# Patient Record
Sex: MALE | Race: WHITE | Employment: UNEMPLOYED | URBAN - METROPOLITAN AREA
[De-identification: names, ages, dates, MRNs, and addresses within clinical notes are randomized per-mention and may not be internally consistent; named-entity substitution may affect disease eponyms.]

---

## 2019-08-27 PROBLEM — E66.01 OBESITY, MORBID (HCC): Status: ACTIVE | Noted: 2019-08-27

## 2019-09-03 ENCOUNTER — HOSPITAL ENCOUNTER (OUTPATIENT)
Dept: LAB | Age: 59
Discharge: HOME | End: 2019-09-03
Payer: OTHER GOVERNMENT

## 2019-09-03 DIAGNOSIS — R06.02 SHORTNESS OF BREATH: ICD-10-CM

## 2019-09-03 LAB
ANION GAP SERPL CALC-SCNC: 3 MMOL/L (ref 7–16)
BASOPHILS # BLD: 0.1 K/UL (ref 0–0.2)
BASOPHILS NFR BLD: 1 % (ref 0–2)
BUN SERPL-MCNC: 16 MG/DL (ref 6–23)
CALCIUM SERPL-MCNC: 9.1 MG/DL (ref 8.3–10.4)
CHLORIDE SERPL-SCNC: 107 MMOL/L (ref 98–107)
CO2 SERPL-SCNC: 29 MMOL/L (ref 21–32)
CREAT SERPL-MCNC: 1.44 MG/DL (ref 0.8–1.5)
DIFFERENTIAL METHOD BLD: ABNORMAL
EOSINOPHIL # BLD: 0.4 K/UL (ref 0–0.8)
EOSINOPHIL NFR BLD: 6 % (ref 0.5–7.8)
ERYTHROCYTE [DISTWIDTH] IN BLOOD BY AUTOMATED COUNT: 13.1 % (ref 11.9–14.6)
GLUCOSE SERPL-MCNC: 87 MG/DL (ref 65–100)
HCT VFR BLD AUTO: 43.2 % (ref 41.1–50.3)
HGB BLD-MCNC: 14 G/DL (ref 13.6–17.2)
IMM GRANULOCYTES # BLD AUTO: 0 K/UL (ref 0–0.5)
IMM GRANULOCYTES NFR BLD AUTO: 0 % (ref 0–5)
INR PPP: 1
LYMPHOCYTES # BLD: 2.3 K/UL (ref 0.5–4.6)
LYMPHOCYTES NFR BLD: 33 % (ref 13–44)
MCH RBC QN AUTO: 30.8 PG (ref 26.1–32.9)
MCHC RBC AUTO-ENTMCNC: 32.4 G/DL (ref 31.4–35)
MCV RBC AUTO: 94.9 FL (ref 79.6–97.8)
MONOCYTES # BLD: 0.5 K/UL (ref 0.1–1.3)
MONOCYTES NFR BLD: 8 % (ref 4–12)
NEUTS SEG # BLD: 3.7 K/UL (ref 1.7–8.2)
NEUTS SEG NFR BLD: 53 % (ref 43–78)
NRBC # BLD: 0 K/UL (ref 0–0.2)
PLATELET # BLD AUTO: 169 K/UL (ref 150–450)
PMV BLD AUTO: 12.7 FL (ref 9.4–12.3)
POTASSIUM SERPL-SCNC: 4.8 MMOL/L (ref 3.5–5.1)
PROTHROMBIN TIME: 13.7 SEC (ref 11.7–14.5)
RBC # BLD AUTO: 4.55 M/UL (ref 4.23–5.6)
SODIUM SERPL-SCNC: 139 MMOL/L (ref 136–145)
WBC # BLD AUTO: 6.9 K/UL (ref 4.3–11.1)

## 2019-09-03 PROCEDURE — 36415 COLL VENOUS BLD VENIPUNCTURE: CPT

## 2019-09-03 PROCEDURE — 85610 PROTHROMBIN TIME: CPT

## 2019-09-03 PROCEDURE — 80048 BASIC METABOLIC PNL TOTAL CA: CPT

## 2019-09-03 PROCEDURE — 85025 COMPLETE CBC W/AUTO DIFF WBC: CPT

## 2019-09-04 NOTE — PROGRESS NOTES
Patient pre-assessment complete for [facility] poss with DR Nell Block scheduled for 19 at 9:30am, arrival time 7:30am. Patient verified using . Patient instructed to bring all home medications in labeled bottles on the day of procedure. NPO status reinforced. Patient informed to take a full dose aspirin 325mg  or 81 mg x 4 on the day of procedure. Patient instructed to HOLD lisinapril/HCTZ & benazepril/HCTZ in am . Instructed they can take all other medications excluding vitamins & supplements. Patient verbalizes understanding of all instructions & denies any questions at this time.

## 2019-09-05 ENCOUNTER — HOSPITAL ENCOUNTER (OUTPATIENT)
Dept: CARDIAC CATH/INVASIVE PROCEDURES | Age: 59
Discharge: HOME | End: 2019-09-05
Attending: INTERNAL MEDICINE | Admitting: INTERNAL MEDICINE
Payer: COMMERCIAL

## 2019-09-05 VITALS
HEART RATE: 55 BPM | WEIGHT: 285 LBS | HEIGHT: 70 IN | SYSTOLIC BLOOD PRESSURE: 106 MMHG | TEMPERATURE: 98 F | OXYGEN SATURATION: 91 % | DIASTOLIC BLOOD PRESSURE: 69 MMHG | BODY MASS INDEX: 40.8 KG/M2 | RESPIRATION RATE: 18 BRPM

## 2019-09-05 LAB
ANION GAP SERPL CALC-SCNC: 6 MMOL/L (ref 7–16)
BUN SERPL-MCNC: 21 MG/DL (ref 6–23)
CALCIUM SERPL-MCNC: 9.1 MG/DL (ref 8.3–10.4)
CHLORIDE SERPL-SCNC: 107 MMOL/L (ref 98–107)
CO2 SERPL-SCNC: 27 MMOL/L (ref 21–32)
CREAT SERPL-MCNC: 1.5 MG/DL (ref 0.8–1.5)
GLUCOSE SERPL-MCNC: 89 MG/DL (ref 65–100)
POTASSIUM SERPL-SCNC: 4.3 MMOL/L (ref 3.5–5.1)
SODIUM SERPL-SCNC: 140 MMOL/L (ref 136–145)

## 2019-09-05 PROCEDURE — 74011000250 HC RX REV CODE- 250: Performed by: INTERNAL MEDICINE

## 2019-09-05 PROCEDURE — 77030004534 HC CATH ANGI DX INFN CARD -A

## 2019-09-05 PROCEDURE — 93458 L HRT ARTERY/VENTRICLE ANGIO: CPT

## 2019-09-05 PROCEDURE — 77030029997 HC DEV COM RDL R BND TELE -B

## 2019-09-05 PROCEDURE — 77030015766

## 2019-09-05 PROCEDURE — C1894 INTRO/SHEATH, NON-LASER: HCPCS

## 2019-09-05 PROCEDURE — 74011636320 HC RX REV CODE- 636/320: Performed by: INTERNAL MEDICINE

## 2019-09-05 PROCEDURE — 80048 BASIC METABOLIC PNL TOTAL CA: CPT

## 2019-09-05 PROCEDURE — 74011250636 HC RX REV CODE- 250/636

## 2019-09-05 PROCEDURE — 99152 MOD SED SAME PHYS/QHP 5/>YRS: CPT

## 2019-09-05 PROCEDURE — C1769 GUIDE WIRE: HCPCS

## 2019-09-05 PROCEDURE — 74011250636 HC RX REV CODE- 250/636: Performed by: INTERNAL MEDICINE

## 2019-09-05 PROCEDURE — 99153 MOD SED SAME PHYS/QHP EA: CPT

## 2019-09-05 PROCEDURE — C1887 CATHETER, GUIDING: HCPCS

## 2019-09-05 RX ORDER — SODIUM CHLORIDE 0.9 % (FLUSH) 0.9 %
5-40 SYRINGE (ML) INJECTION EVERY 8 HOURS
Status: DISCONTINUED | OUTPATIENT
Start: 2019-09-05 | End: 2019-09-05 | Stop reason: HOSPADM

## 2019-09-05 RX ORDER — GUAIFENESIN 100 MG/5ML
81-324 LIQUID (ML) ORAL
Status: DISCONTINUED | OUTPATIENT
Start: 2019-09-05 | End: 2019-09-05 | Stop reason: HOSPADM

## 2019-09-05 RX ORDER — SODIUM CHLORIDE 0.9 % (FLUSH) 0.9 %
5-40 SYRINGE (ML) INJECTION AS NEEDED
Status: DISCONTINUED | OUTPATIENT
Start: 2019-09-05 | End: 2019-09-05 | Stop reason: HOSPADM

## 2019-09-05 RX ORDER — HEPARIN SODIUM 200 [USP'U]/100ML
3 INJECTION, SOLUTION INTRAVENOUS CONTINUOUS
Status: DISCONTINUED | OUTPATIENT
Start: 2019-09-05 | End: 2019-09-05 | Stop reason: HOSPADM

## 2019-09-05 RX ORDER — LIDOCAINE HYDROCHLORIDE 10 MG/ML
10-200 INJECTION INFILTRATION; PERINEURAL ONCE
Status: COMPLETED | OUTPATIENT
Start: 2019-09-05 | End: 2019-09-05

## 2019-09-05 RX ORDER — FENTANYL CITRATE 50 UG/ML
25-50 INJECTION, SOLUTION INTRAMUSCULAR; INTRAVENOUS
Status: DISCONTINUED | OUTPATIENT
Start: 2019-09-05 | End: 2019-09-05 | Stop reason: HOSPADM

## 2019-09-05 RX ORDER — SODIUM CHLORIDE 9 MG/ML
75 INJECTION, SOLUTION INTRAVENOUS CONTINUOUS
Status: DISCONTINUED | OUTPATIENT
Start: 2019-09-05 | End: 2019-09-05 | Stop reason: HOSPADM

## 2019-09-05 RX ORDER — MIDAZOLAM HYDROCHLORIDE 1 MG/ML
.5-2 INJECTION, SOLUTION INTRAMUSCULAR; INTRAVENOUS
Status: DISCONTINUED | OUTPATIENT
Start: 2019-09-05 | End: 2019-09-05 | Stop reason: HOSPADM

## 2019-09-05 RX ORDER — DIAZEPAM 5 MG/1
5 TABLET ORAL ONCE
Status: DISCONTINUED | OUTPATIENT
Start: 2019-09-05 | End: 2019-09-05 | Stop reason: HOSPADM

## 2019-09-05 RX ADMIN — FENTANYL CITRATE 25 MCG: 50 INJECTION, SOLUTION INTRAMUSCULAR; INTRAVENOUS at 09:36

## 2019-09-05 RX ADMIN — HEPARIN SODIUM 2 ML: 10000 INJECTION, SOLUTION INTRAVENOUS; SUBCUTANEOUS at 09:24

## 2019-09-05 RX ADMIN — SODIUM CHLORIDE 75 ML/HR: 900 INJECTION, SOLUTION INTRAVENOUS at 08:46

## 2019-09-05 RX ADMIN — FENTANYL CITRATE 50 MCG: 50 INJECTION, SOLUTION INTRAMUSCULAR; INTRAVENOUS at 09:50

## 2019-09-05 RX ADMIN — HEPARIN SODIUM 3 ML/HR: 5000 INJECTION, SOLUTION INTRAVENOUS; SUBCUTANEOUS at 09:11

## 2019-09-05 RX ADMIN — IOPAMIDOL 100 ML: 755 INJECTION, SOLUTION INTRAVENOUS at 09:54

## 2019-09-05 RX ADMIN — LIDOCAINE HYDROCHLORIDE 2 ML: 10 INJECTION, SOLUTION INFILTRATION; PERINEURAL at 09:24

## 2019-09-05 RX ADMIN — FENTANYL CITRATE 25 MCG: 50 INJECTION, SOLUTION INTRAMUSCULAR; INTRAVENOUS at 09:24

## 2019-09-05 RX ADMIN — MIDAZOLAM HYDROCHLORIDE 2 MG: 1 INJECTION, SOLUTION INTRAMUSCULAR; INTRAVENOUS at 09:24

## 2019-09-05 NOTE — DISCHARGE INSTRUCTIONS

## 2019-09-05 NOTE — PROCEDURES
Brief Cardiac Procedure Note    Patient: Jerald Dent MRN: [id number]  SSN: [id number]    YOB: 1960  Age: 61 y.o. Sex: male      Date of Procedure: 9/5/2019     Pre-procedure Diagnosis: Typical Angina    Post-procedure Diagnosis: Non-cardiac Chest Pain    Reason for Procedure: New Onset Angina < or = 2 Months    Procedure: Left Heart Catheterization    Brief Description of Procedure: lhc via right radial, tr    Performed By: Agata Breaux MD     Assistants: none    Anesthesia: Moderate Sedation    Estimated Blood Loss: Less than 10 mL      Specimens: None    Implants: None    Findings: normal cors and ef    Complications: None    Recommendations: Continue medical therapy.     Signed By: Agata Breaux MD     September 5, 2019

## 2019-09-05 NOTE — PROGRESS NOTES
Discharge instructions and home medications reviewed with patient. Time allowed for questions and answers.  Discharged to home via wheelchair

## 2019-09-05 NOTE — PROGRESS NOTES
TRANSFER - OUT REPORT:    Verbal report given to Rekha Remy on Soniya Blizzard.  being transferred to [facility] for routine progression of care       Report consisted of patients Situation, Background, Assessment and Recommendations(SBAR). Information from the following report(s) SBAR, Kardex, Procedure Summary and MAR was reviewed with the receiving nurse. Opportunity for questions and clarification was provided.       [facility] with Dr Jad Woo  No intervention  2 versed  100 fentanyl  Right radial Rband 10ml at 1000

## 2019-09-05 NOTE — PROGRESS NOTES
Report received from Liat Mercado Cath Lab RN. Procedural findings communicated. Intra procedural  medication administration reviewed. Progression of care discussed.      Patient received into [facility] [place] [facility] post sheath removal.     Right Radial access site without bleeding or swelling     TR band dry and intact     Patient instructed to limit movement to right upper extremity    Routine post procedural vital signs and site assessment initiated

## 2019-09-05 NOTE — PROGRESS NOTES
Patient received to [address] [place] [address] room # 9  Ambulatory from [facility]. Patient scheduled for [facility] today with Dr Umesh Gregg. Procedure reviewed & questions answered, voiced good understanding consent obtained & placed on chart. All medications and medical history reviewed. Will prep patient per orders. Patient & family updated on plan of care. The patient has a fraility score of 3-MANAGING WELL, based on independent of ADLs/ambulation. Increased symptoms with exertion.

## 2019-09-06 NOTE — PROCEDURES
[facility]  CARDIAC CATH    Name:  Venita Ro  MR#:  [id number]  :  1960  ACCOUNT #:  [de-identified]  DATE OF SERVICE:  2019    PREOPERATIVE DIAGNOSIS:  Chest pain. POSTOPERATIVE DIAGNOSIS:  Noncardiac chest pain. PROCEDURE PERFORMED:  Left heart catheterization via the right radial artery with a 5-Persian Estuardo right and a 5-Persian EBU3.0 guide and an angled pigtail. Radial cocktail was given by protocol. Of note, the patient has extreme angulation of his subclavian transition into his aorta with a high left main making cannulation of vessels extremely difficult. SURGEON:  Glenard Libman, MD    ASSISTANT:  None. ESTIMATED BLOOD LOSS:  5 mL. SPECIMENS REMOVED:  None. COMPLICATIONS:  None. IMPLANTS:  None. ANESTHESIA:  Conscious sedation was given under my direct supervision by Debora Blanco RN, beginning at 9:20, concluding at 9:57, a total of 2 mg Versed, 100 mcg fentanyl. Vital signs and saturations were stable throughout. FINDINGS:  The left ventricle is normal size with low normal systolic function. Ejection fraction is 50%. Left ventricular end-diastolic pressure is 17 mmHg with an opening aortic pressure of 95/66. No gradient across the aortic valve. Right coronary artery is a dominant vessel in the normal anatomic position. There is some ectasia in the proximal segment of the vessel. There is mild 20% distal irregularity. The PDA and posterolateral obtuse marginal branches are free of significant disease. The left main coronary artery is in normal anatomic position, free of significant disease. Bifurcates into LAD and circumflex systems. There is no disease in the left main. The LAD has a proximal diagonal branch. 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 The distal vessel is free of significant disease. The circumflex terminates into a moderate-sized obtuse marginal branch.   There is no significant atherosclerosis seen in the circumflex vessel with small distal branches. CONCLUSION:  1. No significant coronary artery atherosclerotic heart disease with preserved LV systolic function. 2.  TR band for hemostasis.         Clare Espinoza MD      [id number]  D:  09/05/2019 10:11  T:  09/05/2019 10:20  JOB #:  [id number]

## 2021-07-15 PROBLEM — M25.562 CHRONIC PAIN OF LEFT KNEE: Status: ACTIVE | Noted: 2021-07-15

## 2021-07-15 PROBLEM — M1A.0720 CHRONIC IDIOPATHIC GOUT INVOLVING TOE OF LEFT FOOT WITHOUT TOPHUS: Status: ACTIVE | Noted: 2021-07-15

## 2021-07-15 PROBLEM — N28.9 RENAL INSUFFICIENCY: Status: ACTIVE | Noted: 2021-07-15

## 2021-07-15 PROBLEM — G89.29 CHRONIC PAIN OF LEFT KNEE: Status: ACTIVE | Noted: 2021-07-15

## 2021-09-13 NOTE — PERIOP NOTES
Called patient to confirm PAT appointment. Patient given directions to [facility] at [address], [address]. Patient states understanding of date, time, and location. Instructed to bring medications with them.

## 2021-09-14 ENCOUNTER — HOSPITAL ENCOUNTER (OUTPATIENT)
Dept: LAB | Age: 61
Discharge: HOME | End: 2021-09-14

## 2021-09-14 PROCEDURE — 88305 TISSUE EXAM BY PATHOLOGIST: CPT

## 2021-09-15 ENCOUNTER — HOSPITAL ENCOUNTER (OUTPATIENT)
Dept: SURGERY | Age: 61
Discharge: HOME | End: 2021-09-15
Attending: ORTHOPAEDIC SURGERY
Payer: MEDICARE

## 2021-09-15 VITALS
SYSTOLIC BLOOD PRESSURE: 111 MMHG | RESPIRATION RATE: 20 BRPM | HEIGHT: 69 IN | HEART RATE: 103 BPM | OXYGEN SATURATION: 96 % | DIASTOLIC BLOOD PRESSURE: 73 MMHG | BODY MASS INDEX: 43.04 KG/M2 | TEMPERATURE: 98.4 F | WEIGHT: 290.6 LBS

## 2021-09-15 LAB
ANION GAP SERPL CALC-SCNC: 6 MMOL/L (ref 7–16)
BACTERIA SPEC CULT: NORMAL
BUN SERPL-MCNC: 33 MG/DL (ref 8–23)
CALCIUM SERPL-MCNC: 9.4 MG/DL (ref 8.3–10.4)
CHLORIDE SERPL-SCNC: 108 MMOL/L (ref 98–107)
CO2 SERPL-SCNC: 26 MMOL/L (ref 21–32)
CREAT SERPL-MCNC: 2.03 MG/DL (ref 0.8–1.5)
ERYTHROCYTE [DISTWIDTH] IN BLOOD BY AUTOMATED COUNT: 13.4 % (ref 11.9–14.6)
GLUCOSE SERPL-MCNC: 131 MG/DL (ref 65–100)
HCT VFR BLD AUTO: 44 % (ref 41.1–50.3)
HGB BLD-MCNC: 14.6 G/DL (ref 13.6–17.2)
MCH RBC QN AUTO: 31.5 PG (ref 26.1–32.9)
MCHC RBC AUTO-ENTMCNC: 33.2 G/DL (ref 31.4–35)
MCV RBC AUTO: 94.8 FL (ref 79.6–97.8)
NRBC # BLD: 0 K/UL (ref 0–0.2)
PLATELET # BLD AUTO: 200 K/UL (ref 150–450)
PMV BLD AUTO: 12.2 FL (ref 9.4–12.3)
POTASSIUM SERPL-SCNC: 4.1 MMOL/L (ref 3.5–5.1)
RBC # BLD AUTO: 4.64 M/UL (ref 4.23–5.6)
SERVICE CMNT-IMP: NORMAL
SODIUM SERPL-SCNC: 140 MMOL/L (ref 136–145)
WBC # BLD AUTO: 10.1 K/UL (ref 4.3–11.1)

## 2021-09-15 PROCEDURE — 85027 COMPLETE CBC AUTOMATED: CPT

## 2021-09-15 PROCEDURE — 87641 MR-STAPH DNA AMP PROBE: CPT

## 2021-09-15 PROCEDURE — 80048 BASIC METABOLIC PNL TOTAL CA: CPT

## 2021-09-15 RX ORDER — ASPIRIN 81 MG/1
81 TABLET ORAL DAILY
COMMUNITY

## 2021-09-15 NOTE — PERIOP NOTES
PLEASE CONTINUE TAKING ALL PRESCRIPTION MEDICATIONS UP TO THE DAY OF SURGERY UNLESS OTHERWISE DIRECTED BELOW. DISCONTINUE all vitamins and supplements 7 days prior to surgery. DISCONTINUE Non-Steriodal Anti-Inflammatory (NSAIDS) such as Advil and Aleve 5 days prior to surgery. Home Medications to take  the day of surgery   Allopurinol/zyloprim  Atorvastatin/lipitor  Fluoxetine/prozac  Pantoprazole/protonix     9/19/2021: On the day before surgery please take Acetaminophen 1000mg in the morning and then again before bed. You may substitute for Tylenol 650 mg. Home Medications   to Hold   DISCONTINUE all vitamins and supplements 7 days prior to surgery. DISCONTINUE Non-Steriodal Anti-Inflammatory (NSAIDS) such as Advil and Aleve 5 days prior to surgery. Comments    Covid test 9/16/2021 @ 10 a.m. @ [address]              Please do not bring home medications with you on the day of surgery unless otherwise directed by your nurse. If you are instructed to bring home medications, please give them to your nurse as they will be administered by the nursing staff. If you have any questions, please call [facility] [phone] or [address] [place] [address] [phone]. A copy of this note was provided to the patient for reference.

## 2021-09-15 NOTE — PERIOP NOTES
Called to Dr. Clemente Kowalski due to increased BUN and creatinine; Dr. Clemente Kowalski asked for patient's primary care to be called to see if the patient needs to be seen due to increase in BUN and Creatinine. Patient could be in HIEU or have CKD. Attempted to call office but they had already closed for the day and had the answering service on. Called to Dr. Osmin Mclean office to inform them of the elevated BUN and creatinine. Talked with Howard Ramirez at Dr. Osmin Mclean and she will inform Dr. Jean Lind and get the patient seen by his PCP or his kidney doctor before surgery. Per tunde patient had been moved to Tuesday anyway due to no availability at [facility] and will have surgery Tuesday 9/21/2021. Results for [place] Tariq (MRN [id number]) as of 9/16/2021 07:38   Ref. Range 7/15/2021 13:03 9/15/2021 14:07   Sodium Latest Ref Range: 136 - 145 mmol/L 138 140   Potassium Latest Ref Range: 3.5 - 5.1 mmol/L 5.0 4.1   Chloride Latest Ref Range: 98 - 107 mmol/L 99 108 (H)   CO2 Latest Ref Range: 21 - 32 mmol/L 24 26   Anion gap Latest Ref Range: 7 - 16 mmol/L  6 (L)   Glucose Latest Ref Range: 65 - 100 mg/dL 89 131 (H)   BUN Latest Ref Range: 8 - 23 MG/DL 24 33 (H)   Creatinine Latest Ref Range: 0.8 - 1.5 MG/DL 1.26 2.03 (H)   BUN/Creatinine ratio Latest Ref Range: 10 - 24  19    Calcium Latest Ref Range: 8.3 - 10.4 MG/DL 9.8 9.4   GFR est non-AA Latest Ref Range: >60 ml/min/1.73m2 61 36 (L)   GFR est AA Latest Ref Range: >60 ml/min/1.73m2 71 43 (L)   Bilirubin, total Latest Ref Range: 0.0 - 1.2 mg/dL 0.3    Protein, total Latest Ref Range: 6.0 - 8.5 g/dL 7.4    Albumin Latest Ref Range: 3.8 - 4.8 g/dL 4.7      Routing this note and copy of attached electrolyte panels to Dr. Jean Lind for review. BUN and Creatinine have increased from labs taken on 7/15/2021.

## 2021-09-15 NOTE — PERIOP NOTES
Patient verified name and     Order for consent NOT found in EHR; patient verified procedure from case posting. Type 3 surgery, in person PAT assessment complete. Labs per surgeon: Unknown  Labs per anesthesia protocol: CBC no diff, BMP, MRSA/MSSA T&S DOS  [id number] Sinus rhythm, left axis deviation, abnormal R Wave progression; no c/p, no SOB. Patient has had cardiac workup in 2021 that was all normal. LVEF 65% for stress test and no intervention when had heart cath in . Has been discharged from cardiology until further needs are identified by the patient. Patient COVID test date 2021; Patient given time and date and address for the appointment. The testing center is located at the [address], [place]. If appointment is needed patient provided telephone number of [phone]. Patient has NOT had covid vaccination but had covid in 2020. Patient was NOT hospitalized for covid. Hospital approved surgical skin cleanser and instructions given per hospital policy. Patient provided with and instructed on educational handouts including Guide to Surgery, Pain Management, Hand Hygiene, Blood Transfusion Education, and [place] [facility] Brochure. Patient answered medical/surgical history questions at their best of ability. All prior to admission medications documented in [facility]. Original medication prescription bottles visualized during patient appointment. Patient instructed to hold all vitamins 7 days prior to surgery and NSAIDS 5 days prior to surgery, patient verbalized understanding. Patient teach back successful and patient demonstrates knowledge of instructions.

## 2021-09-17 ENCOUNTER — HOSPITAL ENCOUNTER (OUTPATIENT)
Dept: SURGERY | Age: 61
Discharge: HOME | End: 2021-09-17

## 2021-09-19 NOTE — PERIOP NOTES
Patient's assessment completed 9/15/21. Pt surgery location changed to [facility] and scheduled for 9/21/21. Patient aware of the above changes.

## 2021-10-22 ENCOUNTER — HOSPITAL ENCOUNTER (OUTPATIENT)
Dept: SURGERY | Age: 61
Discharge: HOME | End: 2021-10-22
Attending: ORTHOPAEDIC SURGERY
Payer: MEDICARE

## 2021-10-22 VITALS
DIASTOLIC BLOOD PRESSURE: 87 MMHG | BODY MASS INDEX: 44.66 KG/M2 | WEIGHT: 301.5 LBS | HEART RATE: 83 BPM | HEIGHT: 69 IN | TEMPERATURE: 98.1 F | SYSTOLIC BLOOD PRESSURE: 129 MMHG | RESPIRATION RATE: 18 BRPM | OXYGEN SATURATION: 96 %

## 2021-10-22 LAB
ANION GAP SERPL CALC-SCNC: 2 MMOL/L (ref 7–16)
BACTERIA SPEC CULT: NORMAL
BUN SERPL-MCNC: 17 MG/DL (ref 8–23)
CALCIUM SERPL-MCNC: 9.5 MG/DL (ref 8.3–10.4)
CHLORIDE SERPL-SCNC: 105 MMOL/L (ref 98–107)
CO2 SERPL-SCNC: 30 MMOL/L (ref 21–32)
CREAT SERPL-MCNC: 1.39 MG/DL (ref 0.8–1.5)
ERYTHROCYTE [DISTWIDTH] IN BLOOD BY AUTOMATED COUNT: 13.7 % (ref 11.9–14.6)
GLUCOSE SERPL-MCNC: 108 MG/DL (ref 65–100)
HCT VFR BLD AUTO: 41.9 % (ref 41.1–50.3)
HGB BLD-MCNC: 14 G/DL (ref 13.6–17.2)
MCH RBC QN AUTO: 32 PG (ref 26.1–32.9)
MCHC RBC AUTO-ENTMCNC: 33.4 G/DL (ref 31.4–35)
MCV RBC AUTO: 95.9 FL (ref 79.6–97.8)
NRBC # BLD: 0 K/UL (ref 0–0.2)
PLATELET # BLD AUTO: 187 K/UL (ref 150–450)
PMV BLD AUTO: 12.6 FL (ref 9.4–12.3)
POTASSIUM SERPL-SCNC: 4.1 MMOL/L (ref 3.5–5.1)
RBC # BLD AUTO: 4.37 M/UL (ref 4.23–5.6)
SERVICE CMNT-IMP: NORMAL
SODIUM SERPL-SCNC: 137 MMOL/L (ref 136–145)
WBC # BLD AUTO: 8.1 K/UL (ref 4.3–11.1)

## 2021-10-22 PROCEDURE — 85027 COMPLETE CBC AUTOMATED: CPT

## 2021-10-22 PROCEDURE — 80048 BASIC METABOLIC PNL TOTAL CA: CPT

## 2021-10-22 PROCEDURE — 87641 MR-STAPH DNA AMP PROBE: CPT

## 2021-10-22 PROCEDURE — 36415 COLL VENOUS BLD VENIPUNCTURE: CPT

## 2021-10-22 NOTE — H&P
Lawrence Jaramillo  History and Physical    Subjective  Problem list:   1 Left Knee pain   2 Right knee pain, TKA 3/8/21     This patient presents today for evaluation of left knee pain. The patient comes in today for evaluation, history and physical, and surgical consent signing. The surgical procedure was reviewed in detail with the patient. The risks, including but not limited to anesthesia, infection, deep vein thrombosis, pulmonary embolus, injury to vessels, tendons, and nerves, paralysis, stroke, heart attack, loss of limb, and death were discussed. The patient understands the post operative course and all questions were answered. No guarantees are made and all alternatives are given. The patient wishes to proceed with the surgery. Appropriate literature and relevant material was reviewed with the patient. Surgical procedure: left total knee replacement    Allergies: NKDA. Family health history: heart disease-father, diabetes-father     Major events: right knee arthroscopy , left thumb CMC arthroplasty, Lt CTR, foot surgery, gallbladder removed, gallstones busted     Ongoing medical problems: acid reflux, high cholesterol, gout     Social history: Patient denies tobacco and ETOH use. Patient is  and currently unemployed. Objective  Vital Signs: Height 69 inches; Weight 295 lbs; /83 mmHg; Temp 97.8 F; Pulse 91 bpm; Oxygen Saturation 96 %       Patient is a 64year old male who appears his given age and is in no apparent distress. Oriented to person, place, and time. Mood and affect are appropriate for age and situation. Assessment of respiratory effort reveals even and nonlabored respirations. GEN:NAD    Lungs clear to auscultation bilaterally. Heart rate regular without murmur heard to auscultation. The patient exhibits antalgic reciprocal gait, and is able to get onto and off of the examination table.        Bilateral Knee X-Rays (4 views, standing - CPT [id number]) taken 12-18-20 revealed: Bilateral knees with severe medial bone on bone loss of joint space. No other acute process is visualized. Left Knee Examination:      The inspection reveals no external signs of injury or trauma. No warmth or erythema noted. No palpable cords. There is a varus deformity. Palpation of the knee reveals tenderness to medial joint line. Knee flexion (active): 110 degrees, with pain. Knee extension (active):  0 degrees, with pain     The muscle tone is normal.     Vascular: Peripheral pulses normal 2/2 lower extremities. Neurologic: Sensation is intact and symmetrical in all dermatomes. Assessment    DIAGNOSIS:        Pain in left knee [ICD-10: M25.562], [ICD-9: 719.46], [SNOMED: 754608080777341]        Unilateral primary osteoarthritis, left knee [ICD-10: M17.12], [ICD-9: 715.16], [SNOMED: 801201617]        Pre-op evaluation [ICD-10: D64.573], [SNOMED: 616092240]  Plan  We discussed the pathophysiology of the diagnosis and options for treatment. We reviewed the conservative treatment options in detail. We discussed the surgical option(s) (left total knee replacement). We have talked about the complications of surgery, including the possibility of damage to nerves, arteries, vessels and tendons, bleeding, infection, the possibility of sustaining medical problems, even death. We have talked about the possibility that the condition may not improve after surgery  or that it could actually be worse. The patient seems to understand and accept these possible complications. The patient understands and wishes to proceed with surgery at this time. Informed surgical consent obtained. Surgery will be performed at [facility] on 10-29-21. The patient states that he uses [facility] in [facility] on [facility] for his pharmacy. All questions answered at this time. The patient knows to contact the office with any questions or concerns.      VERIFICATION OF ANCILLARY DOCUMENTATION: The portions of the chart completed by ancillary personnel were reviewed by the physician. Vipul Zuniga RTC : post-op. MEDICATIONS:        Prazosin HCl 2 MG Oral Capsule 2 tablets at bedtime. Atorvastatin Calcium 40 MG Oral Tablet 1 tablet (40 mg) orally daily        Allopurinol 300 mg oral tablet one daily.         Pantoprazole Sodium 40 MG Oral Tablet Delayed Release 1 tablet (40 mg) orally 2 times per day        Lisinopril-hydroCHLOROthiazide 20-12.5 MG Oral Tablet 2 tablets orally daily        Ambien 10 MG Oral Tablet 1 tablet (10 mg) orally daily at bedtime as needed        Diclofenac Potassium 50 MG Oral Tablet 1 tablet (50 mg) orally 2 times per day with food or milk as needed        Aspirin 81 MG Oral Tablet Delayed Release 1 tablet (81 mg) orally daily        FLUoxetine HCl 20 MG Oral Capsule one po TID

## 2021-10-22 NOTE — PERIOP NOTES
Patient verified name and     Order for consent not found in EHR . Type 3 surgery, PAT walk in assessment complete. Labs per surgeon: None found in EHR, MRSA swab per protocol; results Pending  Labs per anesthesia protocol: CBC, BMP; results pending  EKG: Done 2021 and was faxed from [facility], scanned into EHR for anesthesia reference. Pt had NM stress test 2021 and found in Care Everywhere for anesthesia reference and states:  Narrative    · Resting ECG: normal.   · Protocol: Pharmacologic using regadenoson   · Hemodynamic Response: adequate. · Symptoms: dyspnea. · Stress ECG: normal.   · LV Function: EF is 65%. LV function is normal. The wall motion is   normal.   · Left ventricle cavity size is normal.   · Findings consistent with tissue attenuation artifact. No ischemia or   infarct. Patient COVID test date 10/26/2021 @ 11:30; Patient aware    Hospital approved surgical skin cleanser and instructions given per hospital policy. Patient provided with and instructed on educational handouts including Guide to Surgery, Pain Management, Hand Hygiene, Blood Transfusion Education, and [place] [facility] Brochure. Patient answered medical/surgical history questions at their best of ability. All prior to admission medications documented in [facility]. Original medication prescription bottle not visualized during patient appointment. Patient instructed to hold all vitamins 7 days prior to surgery and NSAIDS 5 days prior to surgery, patient verbalized understanding. Patient teach back successful and patient demonstrates knowledge of instructions.

## 2021-10-22 NOTE — PERIOP NOTES
PLEASE CONTINUE TAKING ALL PRESCRIPTION MEDICATIONS UP TO THE DAY OF SURGERY UNLESS OTHERWISE DIRECTED BELOW. DISCONTINUE all vitamins and supplements 21 days prior to surgery. DISCONTINUE Non-Steriodal Anti-Inflammatory (NSAIDS) such as Advil and Aleve 5 days prior to surgery. Home Medications to take  the day of surgery   Allopurinol  Atorvastatin   Fluoxetine  Pantoprazole        Home Medications   to Hold   Diclofenac x 5 days prior to surgery   Last dose Saturday 10/23/2021        Comments    Covid test 10/26/2021 @ 11:30       [address]    On the day before surgery please take Acetaminophen 1000mg in the morning and then again before bed. You may substitute for Tylenol 650 mg. Please bring bottle of soap (Dynahex) and incentive spirometer to the hospital on the day of surgery. Please do not bring home medications with you on the day of surgery unless otherwise directed by your nurse. If you are instructed to bring home medications, please give them to your nurse as they will be administered by the nursing staff. If you have any questions, please call [facility] [phone]  A copy of this note was provided to the patient for reference.

## 2021-10-28 ENCOUNTER — ANESTHESIA EVENT (OUTPATIENT)
Dept: SURGERY | Age: 61
End: 2021-10-28
Payer: MEDICARE

## 2021-10-29 ENCOUNTER — ANESTHESIA (OUTPATIENT)
Dept: SURGERY | Age: 61
End: 2021-10-29
Payer: MEDICARE

## 2021-10-29 ENCOUNTER — HOSPITAL ENCOUNTER (OUTPATIENT)
Age: 61
Setting detail: OBSERVATION
Discharge: HOME HEALTH | End: 2021-10-31
Attending: ORTHOPAEDIC SURGERY | Admitting: ORTHOPAEDIC SURGERY
Payer: MEDICARE

## 2021-10-29 PROBLEM — M17.12 LOCALIZED OSTEOARTHRITIS OF LEFT KNEE: Status: ACTIVE | Noted: 2021-10-29

## 2021-10-29 PROCEDURE — 99218 HC RM OBSERVATION: CPT

## 2021-10-29 PROCEDURE — 77030040922 HC BLNKT HYPOTHRM STRY -A: Performed by: ANESTHESIOLOGY

## 2021-10-29 PROCEDURE — 2709999900 HC NON-CHARGEABLE SUPPLY

## 2021-10-29 PROCEDURE — C1713 ANCHOR/SCREW BN/BN,TIS/BN: HCPCS | Performed by: ORTHOPAEDIC SURGERY

## 2021-10-29 PROCEDURE — 74011250636 HC RX REV CODE- 250/636: Performed by: NURSE ANESTHETIST, CERTIFIED REGISTERED

## 2021-10-29 PROCEDURE — 74011250636 HC RX REV CODE- 250/636: Performed by: ANESTHESIOLOGY

## 2021-10-29 PROCEDURE — 74011000250 HC RX REV CODE- 250: Performed by: ORTHOPAEDIC SURGERY

## 2021-10-29 PROCEDURE — C1776 JOINT DEVICE (IMPLANTABLE): HCPCS | Performed by: ORTHOPAEDIC SURGERY

## 2021-10-29 PROCEDURE — 76210000017 HC OR PH I REC 1.5 TO 2 HR: Performed by: ORTHOPAEDIC SURGERY

## 2021-10-29 PROCEDURE — 2709999900 HC NON-CHARGEABLE SUPPLY: Performed by: ORTHOPAEDIC SURGERY

## 2021-10-29 PROCEDURE — 76060000035 HC ANESTHESIA 2 TO 2.5 HR: Performed by: ORTHOPAEDIC SURGERY

## 2021-10-29 PROCEDURE — 76942 ECHO GUIDE FOR BIOPSY: CPT | Performed by: ORTHOPAEDIC SURGERY

## 2021-10-29 PROCEDURE — 77030008075: Performed by: ORTHOPAEDIC SURGERY

## 2021-10-29 PROCEDURE — 77030003665 HC NDL SPN BBMI -A: Performed by: ANESTHESIOLOGY

## 2021-10-29 PROCEDURE — 74011250636 HC RX REV CODE- 250/636: Performed by: ORTHOPAEDIC SURGERY

## 2021-10-29 PROCEDURE — 77030040361 HC SLV COMPR DVT MDII -B

## 2021-10-29 PROCEDURE — 77030007880 HC KT SPN EPDRL BBMI -B: Performed by: ANESTHESIOLOGY

## 2021-10-29 PROCEDURE — 74011000250 HC RX REV CODE- 250: Performed by: NURSE ANESTHETIST, CERTIFIED REGISTERED

## 2021-10-29 PROCEDURE — G0378 HOSPITAL OBSERVATION PER HR: HCPCS

## 2021-10-29 PROCEDURE — 76010010054 HC POST OP PAIN BLOCK: Performed by: ORTHOPAEDIC SURGERY

## 2021-10-29 PROCEDURE — 77030031139 HC SUT VCRL2 J&J -A: Performed by: ORTHOPAEDIC SURGERY

## 2021-10-29 PROCEDURE — 77030008462 HC STPLR SKN PROX J&J -A: Performed by: ORTHOPAEDIC SURGERY

## 2021-10-29 PROCEDURE — 77030006835 HC BLD SAW SAG STRY -B: Performed by: ORTHOPAEDIC SURGERY

## 2021-10-29 PROCEDURE — 74011250637 HC RX REV CODE- 250/637: Performed by: ORTHOPAEDIC SURGERY

## 2021-10-29 PROCEDURE — 74011000250 HC RX REV CODE- 250: Performed by: ANESTHESIOLOGY

## 2021-10-29 PROCEDURE — 77030012551: Performed by: ORTHOPAEDIC SURGERY

## 2021-10-29 PROCEDURE — 76010000171 HC OR TIME 2 TO 2.5 HR INTENSV-TIER 1: Performed by: ORTHOPAEDIC SURGERY

## 2021-10-29 PROCEDURE — 77030003602 HC NDL NRV BLK BBMI -B: Performed by: ANESTHESIOLOGY

## 2021-10-29 PROCEDURE — 77030000032 HC CUF TRNQT ZIMM -B: Performed by: ORTHOPAEDIC SURGERY

## 2021-10-29 DEVICE — LEGION CRUCIATE RETAINING                                    NONPOROUS FEMORAL SIZE 5 LEFT
Type: IMPLANTABLE DEVICE | Site: KNEE | Status: FUNCTIONAL
Brand: LEGION

## 2021-10-29 DEVICE — GENESIS II RESURFACING PATELLAR 35MM
Type: IMPLANTABLE DEVICE | Site: KNEE | Status: FUNCTIONAL
Brand: GENESIS II

## 2021-10-29 DEVICE — GENESIS II NON-POROUS TIBIAL                                    BASEPLATE SIZE 5 LEFT
Type: IMPLANTABLE DEVICE | Site: KNEE | Status: FUNCTIONAL
Brand: GENESIS II

## 2021-10-29 DEVICE — LEGION HIGHLY CROSS LINKED                                    POLYETHYLENE DISHED INSERT SIZE 5-6 11MM
Type: IMPLANTABLE DEVICE | Site: KNEE | Status: FUNCTIONAL
Brand: LEGION

## 2021-10-29 DEVICE — KNEE K1 TOT HEMI STD CEM IMPL CAPPED K1 SN: Type: IMPLANTABLE DEVICE | Status: FUNCTIONAL

## 2021-10-29 DEVICE — PALACOS® R IS A FAST-CURING, RADIOPAQUE, POLY(METHYL METHACRYLATE)-BASED BONE CEMENT.PALACOS ® R CONTAINS THE X-RAY CONTRAST MEDIUM ZIRCONIUM DIOXIDE. TO IMPROVE VISIBILITY IN THE SURGICAL FIELD PALACOS ® R HAS BEEN COLOURED WITH CHLOROPHYLL (E141). THE BONE CEMENT IS PREPARED DIRECTLY BEFORE USE BY MIXING A POLYMER POWDER COMPONENT WITH A LIQUID MONOMER COMPONENT. A DUCTILE DOUGH FORMS WHICH CURES WITHIN A FEW MINUTES.
Type: IMPLANTABLE DEVICE | Site: KNEE | Status: FUNCTIONAL
Brand: PALACOS®

## 2021-10-29 RX ORDER — SODIUM CHLORIDE 9 MG/ML
100 INJECTION, SOLUTION INTRAVENOUS CONTINUOUS
Status: DISPENSED | OUTPATIENT
Start: 2021-10-29 | End: 2021-10-31

## 2021-10-29 RX ORDER — SODIUM CHLORIDE 0.9 % (FLUSH) 0.9 %
5-40 SYRINGE (ML) INJECTION AS NEEDED
Status: DISCONTINUED | OUTPATIENT
Start: 2021-10-29 | End: 2021-10-31 | Stop reason: HOSPADM

## 2021-10-29 RX ORDER — DEXAMETHASONE SODIUM PHOSPHATE 100 MG/10ML
INJECTION INTRAMUSCULAR; INTRAVENOUS AS NEEDED
Status: DISCONTINUED | OUTPATIENT
Start: 2021-10-29 | End: 2021-10-29 | Stop reason: HOSPADM

## 2021-10-29 RX ORDER — ATORVASTATIN CALCIUM 40 MG/1
40 TABLET, FILM COATED ORAL
Status: DISCONTINUED | OUTPATIENT
Start: 2021-10-29 | End: 2021-10-31 | Stop reason: HOSPADM

## 2021-10-29 RX ORDER — CEFAZOLIN SODIUM/WATER 2 G/20 ML
2 SYRINGE (ML) INTRAVENOUS ONCE
Status: DISCONTINUED | OUTPATIENT
Start: 2021-10-29 | End: 2021-10-29

## 2021-10-29 RX ORDER — SODIUM CHLORIDE 0.9 % (FLUSH) 0.9 %
5-40 SYRINGE (ML) INJECTION EVERY 8 HOURS
Status: DISCONTINUED | OUTPATIENT
Start: 2021-10-29 | End: 2021-10-31 | Stop reason: HOSPADM

## 2021-10-29 RX ORDER — OXYCODONE HYDROCHLORIDE 5 MG/1
10 TABLET ORAL
Status: DISCONTINUED | OUTPATIENT
Start: 2021-10-29 | End: 2021-10-31 | Stop reason: HOSPADM

## 2021-10-29 RX ORDER — KETAMINE HYDROCHLORIDE 50 MG/ML
INJECTION, SOLUTION INTRAMUSCULAR; INTRAVENOUS AS NEEDED
Status: DISCONTINUED | OUTPATIENT
Start: 2021-10-29 | End: 2021-10-29 | Stop reason: HOSPADM

## 2021-10-29 RX ORDER — ZOLPIDEM TARTRATE 5 MG/1
5 TABLET ORAL
Status: DISCONTINUED | OUTPATIENT
Start: 2021-10-29 | End: 2021-10-31 | Stop reason: HOSPADM

## 2021-10-29 RX ORDER — SODIUM CHLORIDE, SODIUM LACTATE, POTASSIUM CHLORIDE, CALCIUM CHLORIDE 600; 310; 30; 20 MG/100ML; MG/100ML; MG/100ML; MG/100ML
100 INJECTION, SOLUTION INTRAVENOUS CONTINUOUS
Status: DISCONTINUED | OUTPATIENT
Start: 2021-10-29 | End: 2021-10-29 | Stop reason: HOSPADM

## 2021-10-29 RX ORDER — CEFAZOLIN SODIUM/WATER 2 G/20 ML
2 SYRINGE (ML) INTRAVENOUS EVERY 8 HOURS
Status: COMPLETED | OUTPATIENT
Start: 2021-10-29 | End: 2021-10-30

## 2021-10-29 RX ORDER — ALLOPURINOL 300 MG/1
300 TABLET ORAL 2 TIMES DAILY
Status: DISCONTINUED | OUTPATIENT
Start: 2021-10-29 | End: 2021-10-31 | Stop reason: HOSPADM

## 2021-10-29 RX ORDER — MIDAZOLAM HYDROCHLORIDE 1 MG/ML
2 INJECTION, SOLUTION INTRAMUSCULAR; INTRAVENOUS
Status: DISCONTINUED | OUTPATIENT
Start: 2021-10-29 | End: 2021-10-29 | Stop reason: HOSPADM

## 2021-10-29 RX ORDER — HYDROMORPHONE HYDROCHLORIDE 1 MG/ML
0.5 INJECTION, SOLUTION INTRAMUSCULAR; INTRAVENOUS; SUBCUTANEOUS
Status: DISCONTINUED | OUTPATIENT
Start: 2021-10-29 | End: 2021-10-31 | Stop reason: HOSPADM

## 2021-10-29 RX ORDER — ACETAMINOPHEN 650 MG/1
650 SUPPOSITORY RECTAL ONCE
Status: DISCONTINUED | OUTPATIENT
Start: 2021-10-29 | End: 2021-10-29 | Stop reason: SDUPTHER

## 2021-10-29 RX ORDER — TRANEXAMIC ACID 100 MG/ML
INJECTION, SOLUTION INTRAVENOUS AS NEEDED
Status: DISCONTINUED | OUTPATIENT
Start: 2021-10-29 | End: 2021-10-29 | Stop reason: HOSPADM

## 2021-10-29 RX ORDER — HYDROMORPHONE HYDROCHLORIDE 1 MG/ML
0.5 INJECTION, SOLUTION INTRAMUSCULAR; INTRAVENOUS; SUBCUTANEOUS
Status: DISCONTINUED | OUTPATIENT
Start: 2021-10-29 | End: 2021-10-29 | Stop reason: HOSPADM

## 2021-10-29 RX ORDER — MIDAZOLAM HYDROCHLORIDE 1 MG/ML
INJECTION, SOLUTION INTRAMUSCULAR; INTRAVENOUS AS NEEDED
Status: DISCONTINUED | OUTPATIENT
Start: 2021-10-29 | End: 2021-10-29 | Stop reason: HOSPADM

## 2021-10-29 RX ORDER — DIPHENHYDRAMINE HCL 25 MG
25 CAPSULE ORAL
Status: DISCONTINUED | OUTPATIENT
Start: 2021-10-29 | End: 2021-10-31 | Stop reason: HOSPADM

## 2021-10-29 RX ORDER — BUPIVACAINE HYDROCHLORIDE 7.5 MG/ML
INJECTION, SOLUTION INTRASPINAL
Status: COMPLETED | OUTPATIENT
Start: 2021-10-29 | End: 2021-10-29

## 2021-10-29 RX ORDER — NALOXONE HYDROCHLORIDE 0.4 MG/ML
.2-.4 INJECTION, SOLUTION INTRAMUSCULAR; INTRAVENOUS; SUBCUTANEOUS
Status: DISCONTINUED | OUTPATIENT
Start: 2021-10-29 | End: 2021-10-31 | Stop reason: HOSPADM

## 2021-10-29 RX ORDER — CELECOXIB 200 MG/1
200 CAPSULE ORAL EVERY 12 HOURS
Status: DISCONTINUED | OUTPATIENT
Start: 2021-10-29 | End: 2021-10-31 | Stop reason: HOSPADM

## 2021-10-29 RX ORDER — MIDAZOLAM HYDROCHLORIDE 1 MG/ML
2 INJECTION, SOLUTION INTRAMUSCULAR; INTRAVENOUS ONCE
Status: COMPLETED | OUTPATIENT
Start: 2021-10-29 | End: 2021-10-29

## 2021-10-29 RX ORDER — ASPIRIN 81 MG/1
81 TABLET ORAL EVERY 12 HOURS
Status: DISCONTINUED | OUTPATIENT
Start: 2021-10-29 | End: 2021-10-31 | Stop reason: HOSPADM

## 2021-10-29 RX ORDER — FENTANYL CITRATE 50 UG/ML
100 INJECTION, SOLUTION INTRAMUSCULAR; INTRAVENOUS ONCE
Status: COMPLETED | OUTPATIENT
Start: 2021-10-29 | End: 2021-10-29

## 2021-10-29 RX ORDER — NALOXONE HYDROCHLORIDE 0.4 MG/ML
0.04 INJECTION, SOLUTION INTRAMUSCULAR; INTRAVENOUS; SUBCUTANEOUS
Status: DISCONTINUED | OUTPATIENT
Start: 2021-10-29 | End: 2021-10-29 | Stop reason: HOSPADM

## 2021-10-29 RX ORDER — LIDOCAINE HYDROCHLORIDE 10 MG/ML
0.1 INJECTION INFILTRATION; PERINEURAL AS NEEDED
Status: DISCONTINUED | OUTPATIENT
Start: 2021-10-29 | End: 2021-10-29 | Stop reason: HOSPADM

## 2021-10-29 RX ORDER — PANTOPRAZOLE SODIUM 40 MG/1
40 TABLET, DELAYED RELEASE ORAL
Status: DISCONTINUED | OUTPATIENT
Start: 2021-10-29 | End: 2021-10-31 | Stop reason: HOSPADM

## 2021-10-29 RX ORDER — ACETAMINOPHEN 500 MG
1000 TABLET ORAL EVERY 6 HOURS
Status: DISCONTINUED | OUTPATIENT
Start: 2021-10-29 | End: 2021-10-31 | Stop reason: HOSPADM

## 2021-10-29 RX ORDER — HYDROMORPHONE HYDROCHLORIDE 1 MG/ML
1 INJECTION, SOLUTION INTRAMUSCULAR; INTRAVENOUS; SUBCUTANEOUS
Status: DISCONTINUED | OUTPATIENT
Start: 2021-10-29 | End: 2021-10-31 | Stop reason: HOSPADM

## 2021-10-29 RX ORDER — GLYCOPYRROLATE 0.2 MG/ML
INJECTION INTRAMUSCULAR; INTRAVENOUS AS NEEDED
Status: DISCONTINUED | OUTPATIENT
Start: 2021-10-29 | End: 2021-10-29 | Stop reason: HOSPADM

## 2021-10-29 RX ORDER — ONDANSETRON 2 MG/ML
INJECTION INTRAMUSCULAR; INTRAVENOUS AS NEEDED
Status: DISCONTINUED | OUTPATIENT
Start: 2021-10-29 | End: 2021-10-29 | Stop reason: HOSPADM

## 2021-10-29 RX ORDER — AMOXICILLIN 250 MG
2 CAPSULE ORAL DAILY
Status: DISCONTINUED | OUTPATIENT
Start: 2021-10-30 | End: 2021-10-31 | Stop reason: HOSPADM

## 2021-10-29 RX ORDER — DEXAMETHASONE SODIUM PHOSPHATE 100 MG/10ML
10 INJECTION INTRAMUSCULAR; INTRAVENOUS ONCE
Status: ACTIVE | OUTPATIENT
Start: 2021-10-30 | End: 2021-10-31

## 2021-10-29 RX ORDER — FLUOXETINE HYDROCHLORIDE 20 MG/1
60 CAPSULE ORAL DAILY
Status: DISCONTINUED | OUTPATIENT
Start: 2021-10-30 | End: 2021-10-31 | Stop reason: HOSPADM

## 2021-10-29 RX ORDER — ACETAMINOPHEN 325 MG/1
975 TABLET ORAL ONCE
Status: DISCONTINUED | OUTPATIENT
Start: 2021-10-29 | End: 2021-10-29 | Stop reason: SDUPTHER

## 2021-10-29 RX ORDER — EPHEDRINE SULFATE/0.9% NACL/PF 50 MG/5 ML
SYRINGE (ML) INTRAVENOUS AS NEEDED
Status: DISCONTINUED | OUTPATIENT
Start: 2021-10-29 | End: 2021-10-29 | Stop reason: HOSPADM

## 2021-10-29 RX ORDER — LISINOPRIL AND HYDROCHLOROTHIAZIDE 12.5; 2 MG/1; MG/1
2 TABLET ORAL DAILY
Status: DISCONTINUED | OUTPATIENT
Start: 2021-10-30 | End: 2021-10-31 | Stop reason: HOSPADM

## 2021-10-29 RX ORDER — LIDOCAINE HYDROCHLORIDE 20 MG/ML
INJECTION, SOLUTION EPIDURAL; INFILTRATION; INTRACAUDAL; PERINEURAL AS NEEDED
Status: DISCONTINUED | OUTPATIENT
Start: 2021-10-29 | End: 2021-10-29 | Stop reason: HOSPADM

## 2021-10-29 RX ORDER — DEXAMETHASONE SODIUM PHOSPHATE 4 MG/ML
INJECTION, SOLUTION INTRA-ARTICULAR; INTRALESIONAL; INTRAMUSCULAR; INTRAVENOUS; SOFT TISSUE
Status: COMPLETED | OUTPATIENT
Start: 2021-10-29 | End: 2021-10-29

## 2021-10-29 RX ORDER — PRAZOSIN HYDROCHLORIDE 1 MG/1
4 CAPSULE ORAL
Status: DISCONTINUED | OUTPATIENT
Start: 2021-10-29 | End: 2021-10-31 | Stop reason: HOSPADM

## 2021-10-29 RX ORDER — PROPOFOL 10 MG/ML
INJECTION, EMULSION INTRAVENOUS AS NEEDED
Status: DISCONTINUED | OUTPATIENT
Start: 2021-10-29 | End: 2021-10-29 | Stop reason: HOSPADM

## 2021-10-29 RX ADMIN — PROPOFOL 25 MCG/KG/MIN: 10 INJECTION, EMULSION INTRAVENOUS at 12:36

## 2021-10-29 RX ADMIN — PHENYLEPHRINE HYDROCHLORIDE 50 MCG: 10 INJECTION INTRAVENOUS at 12:56

## 2021-10-29 RX ADMIN — ZOLPIDEM TARTRATE 5 MG: 5 TABLET ORAL at 22:06

## 2021-10-29 RX ADMIN — ONDANSETRON 4 MG: 2 INJECTION INTRAMUSCULAR; INTRAVENOUS at 12:26

## 2021-10-29 RX ADMIN — GLYCOPYRROLATE 0.2 MG: 0.2 INJECTION, SOLUTION INTRAMUSCULAR; INTRAVENOUS at 12:33

## 2021-10-29 RX ADMIN — SODIUM CHLORIDE, SODIUM LACTATE, POTASSIUM CHLORIDE, AND CALCIUM CHLORIDE 100 ML/HR: 600; 310; 30; 20 INJECTION, SOLUTION INTRAVENOUS at 10:07

## 2021-10-29 RX ADMIN — ASPIRIN 81 MG: 81 TABLET, COATED ORAL at 20:21

## 2021-10-29 RX ADMIN — PROPOFOL 30 MG: 10 INJECTION, EMULSION INTRAVENOUS at 12:33

## 2021-10-29 RX ADMIN — HYDROMORPHONE HYDROCHLORIDE 1 MG: 1 INJECTION, SOLUTION INTRAMUSCULAR; INTRAVENOUS; SUBCUTANEOUS at 23:58

## 2021-10-29 RX ADMIN — TRANEXAMIC ACID 2 G: 100 INJECTION, SOLUTION INTRAVENOUS at 12:24

## 2021-10-29 RX ADMIN — FENTANYL CITRATE 100 MCG: 50 INJECTION INTRAMUSCULAR; INTRAVENOUS at 12:04

## 2021-10-29 RX ADMIN — DEXAMETHASONE SODIUM PHOSPHATE 4 MG: 4 INJECTION, SOLUTION INTRAMUSCULAR; INTRAVENOUS at 12:05

## 2021-10-29 RX ADMIN — Medication 3 AMPULE: at 10:01

## 2021-10-29 RX ADMIN — KETAMINE HYDROCHLORIDE 30 MG: 50 INJECTION, SOLUTION INTRAMUSCULAR; INTRAVENOUS at 12:33

## 2021-10-29 RX ADMIN — Medication 1 AMPULE: at 20:21

## 2021-10-29 RX ADMIN — PROPOFOL 50 MG: 10 INJECTION, EMULSION INTRAVENOUS at 12:49

## 2021-10-29 RX ADMIN — ALLOPURINOL 300 MG: 300 TABLET ORAL at 20:21

## 2021-10-29 RX ADMIN — Medication 15 MG: at 12:35

## 2021-10-29 RX ADMIN — ACETAMINOPHEN 1000 MG: 500 TABLET, FILM COATED ORAL at 22:06

## 2021-10-29 RX ADMIN — Medication 5 MG: at 13:01

## 2021-10-29 RX ADMIN — PHENYLEPHRINE HYDROCHLORIDE 100 MCG: 10 INJECTION INTRAVENOUS at 13:07

## 2021-10-29 RX ADMIN — DEXAMETHASONE SODIUM PHOSPHATE 10 MG: 10 INJECTION INTRAMUSCULAR; INTRAVENOUS at 12:27

## 2021-10-29 RX ADMIN — Medication 5 MG: at 12:45

## 2021-10-29 RX ADMIN — KETAMINE HYDROCHLORIDE 10 MG: 50 INJECTION, SOLUTION INTRAMUSCULAR; INTRAVENOUS at 13:27

## 2021-10-29 RX ADMIN — PANTOPRAZOLE SODIUM 40 MG: 40 TABLET, DELAYED RELEASE ORAL at 16:44

## 2021-10-29 RX ADMIN — Medication 10 MG: at 12:56

## 2021-10-29 RX ADMIN — ATORVASTATIN CALCIUM 40 MG: 40 TABLET, FILM COATED ORAL at 20:21

## 2021-10-29 RX ADMIN — PHENYLEPHRINE HYDROCHLORIDE 100 MCG: 10 INJECTION INTRAVENOUS at 12:45

## 2021-10-29 RX ADMIN — SODIUM CHLORIDE, SODIUM LACTATE, POTASSIUM CHLORIDE, AND CALCIUM CHLORIDE: 600; 310; 30; 20 INJECTION, SOLUTION INTRAVENOUS at 12:40

## 2021-10-29 RX ADMIN — ROPIVACAINE HYDROCHLORIDE 20 ML: 2 INJECTION, SOLUTION EPIDURAL; INFILTRATION at 12:05

## 2021-10-29 RX ADMIN — PHENYLEPHRINE HYDROCHLORIDE 100 MCG: 10 INJECTION INTRAVENOUS at 12:35

## 2021-10-29 RX ADMIN — ACETAMINOPHEN 1000 MG: 500 TABLET, FILM COATED ORAL at 17:42

## 2021-10-29 RX ADMIN — OXYCODONE 10 MG: 5 TABLET ORAL at 20:21

## 2021-10-29 RX ADMIN — MIDAZOLAM 2 MG: 1 INJECTION INTRAMUSCULAR; INTRAVENOUS at 12:22

## 2021-10-29 RX ADMIN — CELECOXIB 200 MG: 200 CAPSULE ORAL at 20:21

## 2021-10-29 RX ADMIN — BUPIVACAINE HYDROCHLORIDE IN DEXTROSE 15 MG: 7.5 INJECTION, SOLUTION SUBARACHNOID at 12:28

## 2021-10-29 RX ADMIN — KETAMINE HYDROCHLORIDE 5 MG: 50 INJECTION, SOLUTION INTRAMUSCULAR; INTRAVENOUS at 14:02

## 2021-10-29 RX ADMIN — PRAZOSIN HYDROCHLORIDE 4 MG: 1 CAPSULE ORAL at 20:21

## 2021-10-29 RX ADMIN — SODIUM CHLORIDE, SODIUM LACTATE, POTASSIUM CHLORIDE, AND CALCIUM CHLORIDE: 600; 310; 30; 20 INJECTION, SOLUTION INTRAVENOUS at 13:54

## 2021-10-29 RX ADMIN — Medication 10 ML: at 16:44

## 2021-10-29 RX ADMIN — SODIUM CHLORIDE 100 ML/HR: 900 INJECTION, SOLUTION INTRAVENOUS at 16:44

## 2021-10-29 RX ADMIN — LIDOCAINE HYDROCHLORIDE 50 MG: 20 INJECTION, SOLUTION EPIDURAL; INFILTRATION; INTRACAUDAL; PERINEURAL at 12:33

## 2021-10-29 RX ADMIN — PHENYLEPHRINE HYDROCHLORIDE 50 MCG: 10 INJECTION INTRAVENOUS at 13:01

## 2021-10-29 RX ADMIN — CEFAZOLIN 3 G: 1 INJECTION, POWDER, FOR SOLUTION INTRAVENOUS at 12:28

## 2021-10-29 RX ADMIN — MIDAZOLAM 2 MG: 1 INJECTION INTRAMUSCULAR; INTRAVENOUS at 12:04

## 2021-10-29 RX ADMIN — SODIUM CHLORIDE 30 MCG/MIN: 900 INJECTION, SOLUTION INTRAVENOUS at 13:14

## 2021-10-29 RX ADMIN — CEFAZOLIN SODIUM 2 G: 100 INJECTION, POWDER, LYOPHILIZED, FOR SOLUTION INTRAVENOUS at 20:21

## 2021-10-29 RX ADMIN — HYDROMORPHONE HYDROCHLORIDE 1 MG: 1 INJECTION, SOLUTION INTRAMUSCULAR; INTRAVENOUS; SUBCUTANEOUS at 16:44

## 2021-10-29 NOTE — ANESTHESIA PROCEDURE NOTES
Spinal Block    Performed by: Fatoumata Olvera MD  Authorized by: Fatoumata Olvera MD     Pre-procedure: Indications: primary anesthetic  Preanesthetic Checklist: patient identified, risks and benefits discussed, anesthesia consent, patient being monitored and timeout performed    Timeout Time: 12:22 EDT  Preanesthetic Checklist comment:  Risk of nerve damage discussed. Spinal Block:   Patient Position:  Seated  Prep Region:  Lumbar  Prep: chlorhexidine and patient draped      Location:  L3-4  Technique:  Single shot    Local Dose (mL):  3    Needle:   Needle Type:  Pencan  Needle Gauge:  24 G  Attempts:  1      Events: CSF confirmed, no blood with aspiration and no paresthesia        Assessment:  Insertion:  Uncomplicated  Patient tolerance:  Patient tolerated the procedure well with no immediate complications  4 inch 24 ga pencan used.

## 2021-10-29 NOTE — ANESTHESIA POSTPROCEDURE EVALUATION
Procedure(s):  LEFT KNEE ARTHROPLASTY TOTAL. spinal    Anesthesia Post Evaluation        Patient location during evaluation: PACU  Patient participation: complete - patient participated  Level of consciousness: awake  Pain management: satisfactory to patient  Airway patency: patent  Anesthetic complications: no  Cardiovascular status: hemodynamically stable  Respiratory status: spontaneous ventilation  Hydration status: euvolemic  Post anesthesia nausea and vomiting:  none      INITIAL Post-op Vital signs:   Vitals Value Taken Time   /76 10/29/21 1540   Temp 36.5 °C (97.7 °F) 10/29/21 1434   Pulse 81 10/29/21 1543   Resp 18 10/29/21 1500   SpO2 95 % 10/29/21 1543   Vitals shown include unvalidated device data.

## 2021-10-29 NOTE — OP NOTES
Operative Report    Patient: Ashwini Valentine MRN: [id number]  SSN: [id number]    YOB: 1960  Age: 64 y.o. Sex: male      Date of Surgery: 10/29/2021     Patient is a 64 y.o. male with a history of degenerative arthritis of the left knee, refractory to conservative treatment. Patient desires surgical treatment in the form of left total knee arthroplasty. The risks and complications were thoroughly discussed and informed consent was obtained. The patient understands these risks to include but not be limited to infection, wearing out, loosening, infection necessitating multiple procedures to get this resolved, which could even result in amputation, the possibility of blood clots, death, and the other less common but serious complications may occur. Informed consent was obtained. Preoperative Diagnosis: Primary osteoarthritis of left knee [M17.12]     Postoperative Diagnosis: Primary osteoarthritis of left knee [M17.12]     Surgeon(s) and Role:     * Oleg Guerrero MD - Primary    Anesthesia: Spinal     Procedure: Procedure(s) (LRB):  LEFT KNEE ARTHROPLASTY TOTAL (Left) Procedure(s):  LEFT KNEE ARTHROPLASTY TOTAL     Procedure in Detail:   The patient was taken to the Operating Room where spinal anesthesia was introduced. This provided satisfactory anesthesia during the procedure. Tourniquet was used on the upper left thigh over Webril padding and the left lower extremity was prepped and draped into a sterile field. A time-out was done to confirm the operative site, surgeon, and procedure. Upon verification by the surgical team, the procedure was begun. The extremity was exsanguinated by inflating the tourniquet to 275 mmHg. Standard median parapatellar incision was made, dissection carried down through the straight midline incision to the medial side of the extensor mechanism and arthrotomy was created. A medium effusion was evacuated. The knee was then flexed, patella everted laterally. Several small loose bodies were evacuated from the knee. Significant degenerative changes were seen tricompartmentally. The prepatellar fat pad was removed as well as anterior portions of the medial and lateral menisci with sharp dissection. Rongeur was utilized to remove the osteophyte circumference around the patella which was down significantly to bone, and was noted to be DJD. The saw set from the  instrumentation was used to make the undersurface osteotomy, drilling out three holes for the three pegs, a trial component was placed and trialed. The intramedullary guide was then used to perform the distal femoral resection and sizing. The chamfer cuts were made. The extramedullary guide was used for the tibia, the tibial cut was made, balancing the knee carefully. The ACL and PCL were removed. Then the trial components were placed , tibial component, repair of tibial articular surface and then drilled both the femur and the tibia. Three liters of Pulsavac lavage solution were then passed through the knee while a batch of Palacos cement was mixed on the back table. Once this was done, the knee was instrumented beginning with the tibia, the femur, and the patella in that order. The knee was held in full extension with a 11 mm trial liner. Once the cement had hardened and all extraneous bone cement was removed, the tourniquet was released to achieve hemostasis. Permanent 11 mm liner was locked in place and the knee had full extension, full flexion, good stability to varus and valgus stressing along the patellofemoral tract. The knee was then irrigated again. The knee was then closed in a layered fashion with #1 Vicryl in the extensor mechanism, 0 Vicryl deeply, 2-0 Vicryl and surgical clips in the skin. The patient tolerated the procedure well without any complications.     2 grams Ancef and 2 grams TXA given at start of case and one gram TXA with closure and tourniquet release    Estimated Blood Loss:  75 ml    Tourniquet Time:   Total Tourniquet Time Documented:  Thigh (Left) - 62 minutes  Total: Thigh (Left) - 62 minutes        Implants:   Implant Name Type Inv. Item Serial No.  Lot No. LRB No. Used Action   CEMENT BONE 40GM HI VISC PALACOS R - [id number]  CEMENT BONE 40GM HI VISC Martene Fix  [facility]_ [id number] Left 1 Implanted   PAT BCNVX UHMWPE 35MM -- MOE II - [id number]  PAT BCNVX UHMWPE 35MM -- MOE II  [employer] [id number] Left 1 Implanted   BASEPLATE TIB SZ 5 [id number] ML74MM THK2. 3MM L KNEE TI ALLY NP - [id number]  BASEPLATE TIB SZ 5 [id number] ML74MM THK2. 3MM L KNEE TI ALLY NP  [facility] [facility] [id number] Left 1 Implanted   COMPONENT FEM SZ 5 L KNEE CO CHROM CRUCE RET [facility] - [id number]  COMPONENT FEM SZ 5 L KNEE CO CHROM CRUCE RET CINDI [facility] [id number] Left 1 Implanted   INSERT TIB SZ 5-6 [id number] KNEE XLPE CRUCE RET DP [facility] - [id number]  INSERT TIB SZ 5-6 [id number] KNEE XLPE CRUCE RET DP [facility] [id number] Left 1 Implanted               Specimens: * No specimens in log *        Signed By:  Emilia Alvarez MD     October 29, 2021

## 2021-10-29 NOTE — H&P
History and Physical Updated with no interval change.  Adriano Gomez MD H&P Update: No change since previous exam.    Angelo Obregon M.D.  10/29/2021

## 2021-10-29 NOTE — ANESTHESIA PROCEDURE NOTES
Peripheral Block    Start time: 10/29/2021 12:04 PM  End time: 10/29/2021 12:05 PM  Performed by: Marleni Trejo MD  Authorized by: Marleni Trejo MD       Pre-procedure: Indications: at surgeon's request and post-op pain management    Preanesthetic Checklist: patient identified, risks and benefits discussed, site marked, timeout performed, anesthesia consent given and patient being monitored    Timeout Time: 12:04 EDT  Preanesthetic Checklist comment:  Risk of nerve damage discussed. Block Type:   Block Type: Adductor canal block  Laterality:  Left  Monitoring:  Standard ASA monitoring, continuous pulse ox, frequent vital sign checks, heart rate, oxygen and responsive to questions  Injection Technique:  Single shot  Procedures: ultrasound guided    Prep: chlorhexidine    Location:  Mid thigh  Needle Type:  Stimuplex (4 inch)  Needle Gauge:  20 G  Needle Localization:  Ultrasound guidance  Medication Injected:  Ropivacaine 0.2% with epinephrine 1:200,000 injection, 20 mL (Mixture components: ropivacaine 2 mg/mL (0.2 %) Soln, 1 mL; EPINEPHrine HCl (PF) 1 mg/mL (1 mL) Soln, . 005 mL)  dexamethasone (DECADRON) 4 mg/mL injection, 4 mg  Med Admin Time: 10/29/2021 12:05 PM    Assessment:  Number of attempts:  1  Injection Assessment:  Incremental injection every 5 mL, local visualized surrounding nerve on ultrasound, negative aspiration for blood, no intravascular symptoms, no paresthesia and ultrasound image on chart  Patient tolerance:  Patient tolerated the procedure well with no immediate complications  3 cc 1% Lidocaine injected at needle insertion site. Needle inserted and placed in close proximity to the nerve under real time ultrasound guidance. Ultrasound was used to visualize the spread of local anesthetic in close proximity to the nerve being blocked. The nerve appeared anatomically normal and there were no abnormal findings.

## 2021-10-29 NOTE — PROGRESS NOTES
TRANSFER - IN REPORT:    Verbal report received from Grazyna(name) on Lalitha Proctorgabriela.  being received from JCD) for routine progression of care      Report consisted of patients Situation, Background, Assessment and   Recommendations(SBAR). Information from the following report(s) SBAR was reviewed with the receiving nurse. Opportunity for questions and clarification was provided. Assessment completed upon patients arrival to unit and care assumed.

## 2021-10-29 NOTE — PROGRESS NOTES
's pre-procedure visit requested by patient. Conveyed care and concern for patient and family. Offered prayer as requested for patient, family, and staff.     Roxana Rodriguez MDiv, BS  Board Certified

## 2021-10-29 NOTE — ANESTHESIA PREPROCEDURE EVALUATION
Relevant Problems   RESPIRATORY SYSTEM   (+) Dyspnea   (+) TRINA on CPAP      CARDIOVASCULAR   (+) CAD (coronary artery disease)   (+) HTN (hypertension)      GASTROINTESTINAL   (+) GERD (gastroesophageal reflux disease)      RENAL FAILURE   (+) Renal insufficiency      ENDOCRINE   (+) Chronic idiopathic gout involving toe of left foot without tophus   (+) Obesity       Anesthetic History   No history of anesthetic complications            Review of Systems / Medical History  Pertinent labs reviewed    Pulmonary        Sleep apnea: CPAP           Neuro/Psych         Psychiatric history     Cardiovascular    Hypertension              Exercise tolerance: <4 METS: Limited by knee pain. Comments: Normal cath 2019. Normal stress test 7/21.    GI/Hepatic/Renal     GERD: well controlled    Renal disease: CRI  Hiatal hernia     Endo/Other        Morbid obesity and arthritis     Other Findings              Physical Exam    Airway  Mallampati: III  TM Distance: 4 - 6 cm  Neck ROM: normal range of motion, short neck   Mouth opening: Normal    Comments: Large tongue Cardiovascular  Regular rate and rhythm,  S1 and S2 normal,  no murmur, click, rub, or gallop            Comments: distant Dental  No notable dental hx       Pulmonary  Breath sounds clear to auscultation              Comments: distant Abdominal  GI exam deferred       Other Findings            Anesthetic Plan    ASA: 3  Anesthesia type: spinal      Post-op pain plan if not by surgeon: peripheral nerve block single    Induction: Intravenous  Anesthetic plan and risks discussed with: Patient and Spouse

## 2021-10-29 NOTE — PROGRESS NOTES
Hourly rounds performed this shift. Patient denies needs at this time. Bed in low position. The call light and personal items are in reach. Will continue to monitor and report to oncoming nurse. Island Pedicle Flap With Canthal Suspension Text: The defect edges were debeveled with a #15 scalpel blade.  Given the location of the defect, shape of the defect and the proximity to free margins an island pedicle advancement flap was deemed most appropriate.  Using a sterile surgical marker, an appropriate advancement flap was drawn incorporating the defect, outlining the appropriate donor tissue and placing the expected incisions within the relaxed skin tension lines where possible. The area thus outlined was incised deep to adipose tissue with a #15 scalpel blade.  The skin margins were undermined to an appropriate distance in all directions around the primary defect and laterally outward around the island pedicle utilizing iris scissors.  There was minimal undermining beneath the pedicle flap. A suspension suture was placed in the canthal tendon to prevent tension and prevent ectropion.

## 2021-10-29 NOTE — PERIOP NOTES
TRANSFER - OUT REPORT:    Verbal report given to 2600 Jani RN(name) on Duckworth Carlota.  being transferred to [facility](unit) for routine progression of care       Report consisted of patients Situation, Background, Assessment and   Recommendations(SBAR). Information from the following report(s) SBAR, Kardex, OR Summary, Intake/Output, MAR and Cardiac Rhythm NSR was reviewed with the receiving nurse. Lines:   Peripheral IV 10/29/21 Left;Posterior Hand (Active)   Site Assessment Clean, dry, & intact 10/29/21 1447   Phlebitis Assessment 0 10/29/21 1447   Infiltration Assessment 0 10/29/21 1447   Dressing Status Clean, dry, & intact 10/29/21 1447   Dressing Type Transparent;Tape 10/29/21 1447   Hub Color/Line Status Infusing;Green 10/29/21 1447        Opportunity for questions and clarification was provided.       Patient transported with:   O2 @ 2 liters

## 2021-10-30 LAB — HGB BLD-MCNC: 13.4 G/DL (ref 13.6–17.2)

## 2021-10-30 PROCEDURE — 97535 SELF CARE MNGMENT TRAINING: CPT

## 2021-10-30 PROCEDURE — 77030041075 HC DRSG AG OPTIFRM MDII -B

## 2021-10-30 PROCEDURE — 97116 GAIT TRAINING THERAPY: CPT

## 2021-10-30 PROCEDURE — 97161 PT EVAL LOW COMPLEX 20 MIN: CPT

## 2021-10-30 PROCEDURE — 74011250637 HC RX REV CODE- 250/637: Performed by: ORTHOPAEDIC SURGERY

## 2021-10-30 PROCEDURE — 2709999900 HC NON-CHARGEABLE SUPPLY

## 2021-10-30 PROCEDURE — 74011000250 HC RX REV CODE- 250: Performed by: ORTHOPAEDIC SURGERY

## 2021-10-30 PROCEDURE — 99218 HC RM OBSERVATION: CPT

## 2021-10-30 PROCEDURE — 85018 HEMOGLOBIN: CPT

## 2021-10-30 PROCEDURE — G0378 HOSPITAL OBSERVATION PER HR: HCPCS

## 2021-10-30 PROCEDURE — 36415 COLL VENOUS BLD VENIPUNCTURE: CPT

## 2021-10-30 PROCEDURE — 74011250636 HC RX REV CODE- 250/636: Performed by: ORTHOPAEDIC SURGERY

## 2021-10-30 PROCEDURE — 97110 THERAPEUTIC EXERCISES: CPT

## 2021-10-30 PROCEDURE — 97165 OT EVAL LOW COMPLEX 30 MIN: CPT

## 2021-10-30 PROCEDURE — 94760 N-INVAS EAR/PLS OXIMETRY 1: CPT

## 2021-10-30 RX ADMIN — PANTOPRAZOLE SODIUM 40 MG: 40 TABLET, DELAYED RELEASE ORAL at 09:07

## 2021-10-30 RX ADMIN — CELECOXIB 200 MG: 200 CAPSULE ORAL at 22:32

## 2021-10-30 RX ADMIN — HYDROMORPHONE HYDROCHLORIDE 1 MG: 1 INJECTION, SOLUTION INTRAMUSCULAR; INTRAVENOUS; SUBCUTANEOUS at 13:16

## 2021-10-30 RX ADMIN — FLUOXETINE 60 MG: 20 CAPSULE ORAL at 09:06

## 2021-10-30 RX ADMIN — Medication 1 AMPULE: at 09:06

## 2021-10-30 RX ADMIN — Medication 1 AMPULE: at 22:32

## 2021-10-30 RX ADMIN — PANTOPRAZOLE SODIUM 40 MG: 40 TABLET, DELAYED RELEASE ORAL at 18:54

## 2021-10-30 RX ADMIN — ASPIRIN 81 MG: 81 TABLET, COATED ORAL at 09:06

## 2021-10-30 RX ADMIN — CELECOXIB 200 MG: 200 CAPSULE ORAL at 09:06

## 2021-10-30 RX ADMIN — ACETAMINOPHEN 1000 MG: 500 TABLET, FILM COATED ORAL at 04:23

## 2021-10-30 RX ADMIN — OXYCODONE 10 MG: 5 TABLET ORAL at 06:22

## 2021-10-30 RX ADMIN — ATORVASTATIN CALCIUM 40 MG: 40 TABLET, FILM COATED ORAL at 22:32

## 2021-10-30 RX ADMIN — ACETAMINOPHEN 1000 MG: 500 TABLET, FILM COATED ORAL at 11:49

## 2021-10-30 RX ADMIN — ALLOPURINOL 300 MG: 300 TABLET ORAL at 22:33

## 2021-10-30 RX ADMIN — ALLOPURINOL 300 MG: 300 TABLET ORAL at 09:06

## 2021-10-30 RX ADMIN — Medication 10 ML: at 14:48

## 2021-10-30 RX ADMIN — ASPIRIN 81 MG: 81 TABLET, COATED ORAL at 22:32

## 2021-10-30 RX ADMIN — ACETAMINOPHEN 1000 MG: 500 TABLET, FILM COATED ORAL at 18:54

## 2021-10-30 RX ADMIN — OXYCODONE 10 MG: 5 TABLET ORAL at 22:40

## 2021-10-30 RX ADMIN — ZOLPIDEM TARTRATE 5 MG: 5 TABLET ORAL at 22:32

## 2021-10-30 RX ADMIN — DOCUSATE SODIUM 50MG AND SENNOSIDES 8.6MG 2 TABLET: 8.6; 5 TABLET, FILM COATED ORAL at 09:06

## 2021-10-30 RX ADMIN — PRAZOSIN HYDROCHLORIDE 4 MG: 1 CAPSULE ORAL at 22:33

## 2021-10-30 RX ADMIN — CEFAZOLIN SODIUM 2 G: 100 INJECTION, POWDER, LYOPHILIZED, FOR SOLUTION INTRAVENOUS at 04:24

## 2021-10-30 RX ADMIN — OXYCODONE 10 MG: 5 TABLET ORAL at 11:50

## 2021-10-30 RX ADMIN — OXYCODONE 10 MG: 5 TABLET ORAL at 18:54

## 2021-10-30 RX ADMIN — LISINOPRIL AND HYDROCHLOROTHIAZIDE 2 TABLET: 12.5; 2 TABLET ORAL at 09:06

## 2021-10-30 NOTE — PROGRESS NOTES
Alert and oriented. He looks great. Slightly low bp overnight noted. I think it's not a worry. The wound area is benign with no obvious infection. He seems very comfortable with po meds. Denies chest pain or dyspnea  No inordinate pain in the area of the surgery. Slight serosanguinous bleeding on dressing. Hydration status acceptable.  Wean IV fluids as able today

## 2021-10-30 NOTE — PROGRESS NOTES
Care Management Interventions  Transition of Care Consult (CM Consult): [address]: Yes  Support Systems: Spouse/Significant Other  The Plan for Transition of Care is Related to the Following Treatment Goals :  Increase strength  The Patient and/or Patient Representative was Provided with a Choice of Provider and Agrees with the Discharge Plan?: Yes  Freedom of Choice List was Provided with Basic Dialogue that Supports the Patient's Individualized Plan of Care/Goals, Treatment Preferences and Shares the Quality Data Associated with the Providers?: Yes  Discharge Location  Discharge Placement: Home with home health

## 2021-10-30 NOTE — PROGRESS NOTES
Problem: Mobility Impaired (Adult and Pediatric)  Goal: *Acute Goals and Plan of Care (Insert Text)  Outcome: Progressing Towards Goal  Note: GOALS (1-4 days):  (1.)Mr. Jose Francisco Allen will move from supine to sit and sit to supine  in bed with SUPERVISION. (2.)Mr. Jose Francisco Allen will transfer from bed to chair and chair to bed with SUPERVISION using the least restrictive device. (3.)Mr. Jose Francisco Allen will ambulate with SUPERVISION for 300 feet with the least restrictive device. (4.)Mr. Jose Francisco Allen will ambulate up/down 3 steps with bilateral  railing with CONTACT GUARD ASSIST with no device. (5.)Mr. Jose Francisco Allen will increase left knee ROM to 0°-90°.  ________________________________________________________________________________________________      PHYSICAL THERAPY JOINT CAMP TKA: Daily Note, Treatment Day: Day of Assessment and PM 10/30/2021  OBSERVATION: Hospital Day: 2  Payor: Sadia Cisneros / Plan: Estelita Noble / Product Type: [employer] Care Medicare /      NAME/AGE/GENDER: Shi Chang is a 64 y.o. male   PRIMARY DIAGNOSIS:  Primary osteoarthritis of left knee [M17.12]   Procedure(s) and Anesthesia Type:     * LEFT KNEE ARTHROPLASTY TOTAL - Spinal (Left)  ICD-10: Treatment Diagnosis:    · Pain in Left Knee (M25.562)  · Stiffness of Left Knee, Not elsewhere classified (M25.662)  · Difficulty in walking, Not elsewhere classified (R26.2)      ASSESSMENT:     Mr. Jose Francisco Allen presents with decreased strength and range of motion left lower extremity and with decreased independence with functional mobility s/p left TKA. Pt had his right knee replaced in March of this year. Pt will benefit from skilled PT interventions to maximize independence with functional mobility and TKA management. Pt did well with assessment. Worked on bed mobility today, sit to stand and gait training in the quintana. Pt with low BP this am limiting his gait. Back in room in recliner worked on TKA exercises with verbal cues.  Pt stayed up in recliner with ice on knee and needs in reach. Pt instructed not to get up without assistance. Hope to progress mobility and exercises in the morning. Pt plans to discharge to home with continued therapy for follow up.   10/30- pt supine on contact and sleeping, woke easily and agreeable. Worked on his TKA exercises in supine with verbal cues progressing with repetitions. Then supine to sit with SBA and worked on gait training in the quintana with verbal cues making good progress with distance. Back in room he wanted to get back in the bed and stretch back out. He stayed in supine with ice on knee and needs in reach. Will continue to follow. This section established at most recent assessment   PROBLEM LIST (Impairments causing functional limitations):  1. Decreased Strength  2. Decreased ADL/Functional Activities  3. Decreased Transfer Abilities  4. Decreased Ambulation Ability/Technique  5. Decreased Flexibility/Joint Mobility  6. Edema/Girth  7. Decreased [place] with Home Exercise Program   INTERVENTIONS PLANNED: (Benefits and precautions of physical therapy have been discussed with the patient.)  1. Bed Mobility  2. Cold  3. Gait Training  4. Home Exercise Program (HEP)  5. Range of Motion (ROM)  6. Therapeutic Activites  7. Therapeutic Exercise/Strengthening  8. Transfer Training     TREATMENT PLAN: Frequency/Duration: Follow patient BID for duration of hospital stay to address above goals. Rehabilitation Potential For Stated Goals: Good     RECOMMENDED REHABILITATION/EQUIPMENT: (at time of discharge pending progress): Continue Skilled Therapy, Home Health: Physical Therapy, and Outpatient: Physical Therapy.               HISTORY:   History of Present Injury/Illness (Reason for Referral):  Pt s/p total knee arthroplasty on 10/29/2021  Past Medical History/Comorbidities:   Mr. Arvin Mcdermott  has a past medical history of CAD (coronary artery disease) (11/14/2012), Chronic kidney disease (07/12/2021), Dyspnea (11/14/2012), GERD (gastroesophageal reflux disease), Gout, Hiatal hernia, History of COVID-19 (10/2020), History of EKG (07/12/2021), HLD (hyperlipidemia) (11/14/2012), HTN (hypertension) (11/14/2012), cardiovascular stress test (07/13/2021), Hypercholesteremia, Obesity (11/14/2012), Osteoarthritis (11/14/2012), Psychiatric disorder, and Sleep apnea (11/14/2012). He also has no past medical history of Malignant hyperthermia due to anesthesia or Pseudocholinesterase deficiency. Mr. Angelito Walsh  has a past surgical history that includes hx appendectomy; hx cholecystectomy; hx carpal tunnel release; hx lithotripsy; hx orthopaedic; hx knee replacement (Right, 03/08/2021); pr cardiac surg procedure unlist; hx endoscopy (09/14/2021); and hx colonoscopy (07/14/2021). Social History/Living Environment:   Home Environment: Trailer/mobile home  # Steps to Enter: 2  One/Two Story Residence: One story  Living Alone: No  Support Systems: Spouse/Significant Other  Patient Expects to be Discharged to[de-identified] Trailer/mobile home  Current DME Used/Available at Home: Cane, straight, Raised toilet seat, Walker, rolling  Tub or Shower Type: Shower  Prior Level of Function/Work/Activity:  Pt living at home with spouse   Number of Personal Factors/Comorbidities that affect the Plan of Care: 0: LOW COMPLEXITY   EXAMINATION:   Most Recent Physical Functioning:   Gross Assessment: Yes  Gross Assessment  AROM: Within functional limits (except left knee s/p TKA)  Strength: Within functional limits (except left knee s/p TKA)                     Bed Mobility  Supine to Sit: Stand-by assistance  Sit to Supine: Stand-by assistance    Transfers  Sit to Stand: Stand-by assistance  Stand to Sit: Stand-by assistance    Balance  Sitting: Intact  Standing: Intact; With support    Posture  Posture (WDL): Within defined limits    Gait Training: Yes    Weight Bearing Status  Left Side Weight Bearing: As tolerated  Distance (ft): 225 Feet (ft)  Ambulation - Level of Assistance: Stand-by assistance  Assistive Device: Walker, rolling  Speed/Siobhan: Pace decreased (<100 feet/min)  Step Length: Right shortened  Stance: Left decreased  Gait Abnormalities: Antalgic;Decreased step clearance  Interventions: Safety awareness training;Verbal cues     Braces/Orthotics: none    Left Knee Cold  Type: Cryocuff  Patient Position: Sitting      Body Structures Involved:  1. Joints  2. Muscles Body Functions Affected:  1. Movement Related Activities and Participation Affected:  1. Mobility  2. Self Care   Number of elements that affect the Plan of Care: 4+: HIGH COMPLEXITY   CLINICAL PRESENTATION:   Presentation: Stable and uncomplicated: LOW COMPLEXITY   CLINICAL DECISION MAKIN[address] [place] [address] AM-PAC 6 Clicks   Basic Mobility Inpatient Short Form  How much difficulty does the patient currently have. .. Unable A Lot A Little None   1. Turning over in bed (including adjusting bedclothes, sheets and blankets)? [] 1   [] 2   [x] 3   [] 4   2. Sitting down on and standing up from a chair with arms ( e.g., wheelchair, bedside commode, etc.)   [] 1   [] 2   [x] 3   [] 4   3. Moving from lying on back to sitting on the side of the bed? [] 1   [] 2   [x] 3   [] 4   How much help from another person does the patient currently need. .. Total A Lot A Little None   4. Moving to and from a bed to a chair (including a wheelchair)? [] 1   [] 2   [x] 3   [] 4   5. Need to walk in hospital room? [] 1   [] 2   [x] 3   [] 4   6. Climbing 3-5 steps with a railing? [] 1   [] 2   [x] 3   [] 4   © , Trustees of [address] [place] [id number], under license to [employer]. All rights reserved     Score:  Initial: 18 Most Recent: X (Date: -- )    Interpretation of Tool:  Represents activities that are increasingly more difficult (i.e. Bed mobility, Transfers, Gait).     Medical Necessity:     · Patient is expected to demonstrate progress in   · strength, range of motion, and functional technique  ·  to · decrease assistance required with functional mobility and TKA managment  · .  Reason for Services/Other Comments:  · Patient continues to require skilled intervention due to   · Inability to complete functional mobility and TKA management independently  · . Use of outcome tool(s) and clinical judgement create a POC that gives a: Clear prediction of patient's progress: LOW COMPLEXITY            TREATMENT:   (In addition to Assessment/Re-Assessment sessions the following treatments were rendered)     Pre-treatment Symptoms/Complaints:  none  Pain Initial:   Pain Intensity 1:  (sore, had iv pain meds earlier)  Post Session: no reports of pain     Gait Training (15 Minutes):  Gait training to improve and/or restore physical functioning as related to mobility and strength. Ambulated 225 Feet (ft) with Stand-by assistance using a Walker, rolling and minimal Safety awareness training;Verbal cues related to their stance phase and stride length to promote proper body alignment and promote proper body posture. Instruction in performance of walker use and gait sequencing to correct stance phase and stride length. Therapeutic Exercise: (15 Minutes):  Exercises per grid below to improve mobility and strength. Required minimal verbal cues to promote proper body alignment and promote proper body posture. Progressed repetitions as indicated. Date:  10/30 Date:   Date:     ACTIVITY/EXERCISE AM PM AM PM AM PM     []  []  []  []  []  []   Ankle Pumps 10 15       Quad Sets 10 15       Gluteal Sets 10 15       Hip ABd/ADduction 10 15       Straight Leg Raises 10 15       Knee Slides 10 15       Short Arc Quads 10 15       Chair Slides                           B = bilateral; AA = active assistive; A = active; P = passive      Treatment/Session Assessment:     Response to Treatment:  pt tolerated well.     Education:  [x] Home Exercises  [x] Fall Precautions  [x] Use of Cold Therapy Unit [] D/C Instruction Review  [] Knee Prosthesis Review  [x] Walker Management/Safety [] Adaptive Equipment as Needed  [x] No pillow under knee       Interdisciplinary Collaboration:   o Registered Nurse    After treatment position/precautions:   o Supine in bed  o Bed/Chair-wheels locked  o Bed in low position  o Call light within reach  o Family at bedside    Compliance with Program/Exercises: Compliant all of the time, Will assess as treatment progresses. Recommendations/Intent for next treatment session:  Treatment next visit will focus on increasing Mr. Siddiquis independence with bed mobility, transfers, gait training, strength/ROM exercises, modalities for pain, and patient education.       Total Treatment Duration:  PT Patient Time In/Time Out  Time In: 1430  Time Out: [id number], PT

## 2021-10-30 NOTE — PROGRESS NOTES
Problem: Mobility Impaired (Adult and Pediatric)  Goal: *Acute Goals and Plan of Care (Insert Text)  Outcome: Progressing Towards Goal  Note: GOALS (1-4 days):  (1.)Mr. Varghese Austin will move from supine to sit and sit to supine  in bed with SUPERVISION. (2.)Mr. Varghese Austin will transfer from bed to chair and chair to bed with SUPERVISION using the least restrictive device. (3.)Mr. Varghese Austin will ambulate with SUPERVISION for 300 feet with the least restrictive device. (4.)Mr. Varghese Austin will ambulate up/down 3 steps with bilateral  railing with CONTACT GUARD ASSIST with no device. (5.)Mr. Varghese Austin will increase left knee ROM to 0°-90°.  ________________________________________________________________________________________________      PHYSICAL THERAPY JOINT CAMP TKA: Initial Assessment, Treatment Day: Day of Assessment, and AM 10/30/2021  OBSERVATION: Hospital Day: 2  Payor: Hilda Wallace / Plan: [address] / Product Type: [employer] /      NAME/AGE/GENDER: Ajay Gibbons is a 64 y.o. male   PRIMARY DIAGNOSIS:  Primary osteoarthritis of left knee [M17.12]   Procedure(s) and Anesthesia Type:     * LEFT KNEE ARTHROPLASTY TOTAL - Spinal (Left)  ICD-10: Treatment Diagnosis:    · Pain in Left Knee (M25.562)  · Stiffness of Left Knee, Not elsewhere classified (M25.662)  · Difficulty in walking, Not elsewhere classified (R26.2)      ASSESSMENT:     Mr. Varghese Austin presents with decreased strength and range of motion left lower extremity and with decreased independence with functional mobility s/p left TKA. Pt had his right knee replaced in March of this year. Pt will benefit from skilled PT interventions to maximize independence with functional mobility and TKA management. Pt did well with assessment. Worked on bed mobility today, sit to stand and gait training in the quintana. Pt with low BP this am limiting his gait. Back in room in recliner worked on TKA exercises with verbal cues.  Pt stayed up in recliner with ice on knee and needs in reach. Pt instructed not to get up without assistance. Hope to progress mobility and exercises in the morning. Pt plans to discharge to home with continued therapy for follow up. This section established at most recent assessment   PROBLEM LIST (Impairments causing functional limitations):  1. Decreased Strength  2. Decreased ADL/Functional Activities  3. Decreased Transfer Abilities  4. Decreased Ambulation Ability/Technique  5. Decreased Flexibility/Joint Mobility  6. Edema/Girth  7. Decreased [place] with Home Exercise Program   INTERVENTIONS PLANNED: (Benefits and precautions of physical therapy have been discussed with the patient.)  1. Bed Mobility  2. Cold  3. Gait Training  4. Home Exercise Program (HEP)  5. Range of Motion (ROM)  6. Therapeutic Activites  7. Therapeutic Exercise/Strengthening  8. Transfer Training     TREATMENT PLAN: Frequency/Duration: Follow patient BID for duration of hospital stay to address above goals. Rehabilitation Potential For Stated Goals: Good     RECOMMENDED REHABILITATION/EQUIPMENT: (at time of discharge pending progress): Continue Skilled Therapy, Home Health: Physical Therapy, and Outpatient: Physical Therapy. HISTORY:   History of Present Injury/Illness (Reason for Referral):  Pt s/p total knee arthroplasty on 10/29/2021  Past Medical History/Comorbidities:   Mr. Sinena Chong  has a past medical history of CAD (coronary artery disease) (11/14/2012), Chronic kidney disease (07/12/2021), Dyspnea (11/14/2012), GERD (gastroesophageal reflux disease), Gout, Hiatal hernia, History of COVID-19 (10/2020), History of EKG (07/12/2021), HLD (hyperlipidemia) (11/14/2012), HTN (hypertension) (11/14/2012), cardiovascular stress test (07/13/2021), Hypercholesteremia, Obesity (11/14/2012), Osteoarthritis (11/14/2012), Psychiatric disorder, and Sleep apnea (11/14/2012).  He also has no past medical history of Malignant hyperthermia due to anesthesia or Pseudocholinesterase deficiency. Mr. Susan Wilburn  has a past surgical history that includes hx appendectomy; hx cholecystectomy; hx carpal tunnel release; hx lithotripsy; hx orthopaedic; hx knee replacement (Right, 03/08/2021); pr cardiac surg procedure unlist; hx endoscopy (09/14/2021); and hx colonoscopy (07/14/2021). Social History/Living Environment:   Home Environment: Trailer/mobile home  # Steps to Enter: 2  One/Two Story Residence: One story  Living Alone: No  Support Systems: Spouse/Significant Other  Patient Expects to be Discharged to[de-identified] Trailer/mobile home  Current DME Used/Available at Home: Cane, straight, Raised toilet seat, Walker, rolling  Tub or Shower Type: Shower  Prior Level of Function/Work/Activity:  Pt living at home with spouse   Number of Personal Factors/Comorbidities that affect the Plan of Care: 0: LOW COMPLEXITY   EXAMINATION:   Most Recent Physical Functioning:   Gross Assessment: Yes  Gross Assessment  AROM: Within functional limits (except left knee s/p TKA)  Strength: Within functional limits (except left knee s/p TKA)                     Bed Mobility  Supine to Sit: Stand-by assistance    Transfers  Sit to Stand: Stand-by assistance  Stand to Sit: Stand-by assistance    Balance  Sitting: Intact  Standing: Intact; With support    Posture  Posture (WDL): Within defined limits         Weight Bearing Status  Left Side Weight Bearing: As tolerated  Distance (ft): 65 Feet (ft)  Ambulation - Level of Assistance: Contact guard assistance  Assistive Device: Walker, rolling  Speed/Siobhan: Pace decreased (<100 feet/min)  Step Length: Right shortened  Stance: Left decreased  Gait Abnormalities: Antalgic;Decreased step clearance  Interventions: Safety awareness training;Verbal cues     Braces/Orthotics: none           Body Structures Involved:  1. Joints  2. Muscles Body Functions Affected:  1.  Movement Related Activities and Participation Affected:  1. Mobility  2. Self Care   Number of elements that affect the Plan of Care: 4+: HIGH COMPLEXITY   CLINICAL PRESENTATION:   Presentation: Stable and uncomplicated: LOW COMPLEXITY   CLINICAL DECISION MAKIN [facility] [address] [id number] AM-PAC 6 Clicks   Basic Mobility Inpatient Short Form  How much difficulty does the patient currently have. .. Unable A Lot A Little None   1. Turning over in bed (including adjusting bedclothes, sheets and blankets)? [] 1   [] 2   [x] 3   [] 4   2. Sitting down on and standing up from a chair with arms ( e.g., wheelchair, bedside commode, etc.)   [] 1   [] 2   [x] 3   [] 4   3. Moving from lying on back to sitting on the side of the bed? [] 1   [] 2   [x] 3   [] 4   How much help from another person does the patient currently need. .. Total A Lot A Little None   4. Moving to and from a bed to a chair (including a wheelchair)? [] 1   [] 2   [x] 3   [] 4   5. Need to walk in hospital room? [] 1   [] 2   [x] 3   [] 4   6. Climbing 3-5 steps with a railing? [] 1   [] 2   [x] 3   [] 4   © , Trustees of [address] [place] [address] [address] [address], under license to [employer]. All rights reserved     Score:  Initial: 18 Most Recent: X (Date: -- )    Interpretation of Tool:  Represents activities that are increasingly more difficult (i.e. Bed mobility, Transfers, Gait). Medical Necessity:     · Patient is expected to demonstrate progress in   · strength, range of motion, and functional technique  ·  to   · decrease assistance required with functional mobility and TKA managment  · .  Reason for Services/Other Comments:  · Patient continues to require skilled intervention due to   · Inability to complete functional mobility and TKA management independently  · .    Use of outcome tool(s) and clinical judgement create a POC that gives a: Clear prediction of patient's progress: LOW COMPLEXITY            TREATMENT:   (In addition to Assessment/Re-Assessment sessions the following treatments were rendered)     Pre-treatment Symptoms/Complaints:  none  Pain Initial:   Pain Intensity 1:  (sore with exercises)  Post Session: no reports of pain     Gait Training (15 Minutes):  Gait training to improve and/or restore physical functioning as related to mobility and strength. Ambulated 65 Feet (ft) with Contact guard assistance using a Walker, rolling and minimal Safety awareness training;Verbal cues related to their stance phase and stride length to promote proper body alignment and promote proper body posture. Instruction in performance of walker use and gait sequencing to correct stance phase and stride length. Therapeutic Exercise: (15 Minutes):  Exercises per grid below to improve mobility and strength. Required minimal verbal cues to promote proper body alignment and promote proper body posture. Progressed repetitions as indicated. Assessment   Date:  10/30 Date:   Date:     ACTIVITY/EXERCISE AM PM AM PM AM PM     []  []  []  []  []  []   Ankle Pumps 10        Quad Sets 10        Gluteal Sets 10        Hip ABd/ADduction 10        Straight Leg Raises 10        Knee Slides 10        Short Arc Quads 10        Chair Slides                           B = bilateral; AA = active assistive; A = active; P = passive      Treatment/Session Assessment:     Response to Treatment:  pt tolerated well. Education:  [x] Home Exercises  [x] Fall Precautions  [x] Use of Cold Therapy Unit [] D/C Instruction Review  [] Knee Prosthesis Review  [x] Walker Management/Safety [] Adaptive Equipment as Needed  [x] No pillow under knee       Interdisciplinary Collaboration:   o Registered Nurse    After treatment position/precautions:   o Up in chair  o Bed/Chair-wheels locked  o Call light within reach    Compliance with Program/Exercises: Compliant all of the time, Will assess as treatment progresses. Recommendations/Intent for next treatment session:  Treatment next visit will focus on increasing Mr. Pickett's independence with bed mobility, transfers, gait training, strength/ROM exercises, modalities for pain, and patient education.       Total Treatment Duration:  PT Patient Time In/Time Out  Time In: 0845  Time Out: [facility],

## 2021-10-31 ENCOUNTER — HOME HEALTH ADMISSION (OUTPATIENT)
Dept: HOME HEALTH SERVICES | Facility: HOME HEALTH | Age: 61
End: 2021-10-31
Payer: MEDICARE

## 2021-10-31 VITALS
HEART RATE: 75 BPM | SYSTOLIC BLOOD PRESSURE: 111 MMHG | TEMPERATURE: 97.9 F | DIASTOLIC BLOOD PRESSURE: 76 MMHG | RESPIRATION RATE: 17 BRPM | OXYGEN SATURATION: 92 %

## 2021-10-31 PROCEDURE — 99218 HC RM OBSERVATION: CPT

## 2021-10-31 PROCEDURE — G0378 HOSPITAL OBSERVATION PER HR: HCPCS

## 2021-10-31 PROCEDURE — 2709999900 HC NON-CHARGEABLE SUPPLY

## 2021-10-31 PROCEDURE — 97110 THERAPEUTIC EXERCISES: CPT

## 2021-10-31 PROCEDURE — 74011250637 HC RX REV CODE- 250/637: Performed by: ORTHOPAEDIC SURGERY

## 2021-10-31 PROCEDURE — 97116 GAIT TRAINING THERAPY: CPT

## 2021-10-31 RX ADMIN — ACETAMINOPHEN 1000 MG: 500 TABLET, FILM COATED ORAL at 12:58

## 2021-10-31 RX ADMIN — ASPIRIN 81 MG: 81 TABLET, COATED ORAL at 09:38

## 2021-10-31 RX ADMIN — ACETAMINOPHEN 1000 MG: 500 TABLET, FILM COATED ORAL at 00:51

## 2021-10-31 RX ADMIN — ACETAMINOPHEN 1000 MG: 500 TABLET, FILM COATED ORAL at 06:35

## 2021-10-31 RX ADMIN — PANTOPRAZOLE SODIUM 40 MG: 40 TABLET, DELAYED RELEASE ORAL at 06:35

## 2021-10-31 RX ADMIN — OXYCODONE 10 MG: 5 TABLET ORAL at 03:55

## 2021-10-31 RX ADMIN — DOCUSATE SODIUM 50MG AND SENNOSIDES 8.6MG 2 TABLET: 8.6; 5 TABLET, FILM COATED ORAL at 09:38

## 2021-10-31 RX ADMIN — CELECOXIB 200 MG: 200 CAPSULE ORAL at 09:38

## 2021-10-31 RX ADMIN — FLUOXETINE 60 MG: 20 CAPSULE ORAL at 09:37

## 2021-10-31 RX ADMIN — OXYCODONE 10 MG: 5 TABLET ORAL at 13:57

## 2021-10-31 RX ADMIN — Medication 10 ML: at 13:00

## 2021-10-31 RX ADMIN — OXYCODONE 10 MG: 5 TABLET ORAL at 09:37

## 2021-10-31 RX ADMIN — Medication 1 AMPULE: at 09:38

## 2021-10-31 RX ADMIN — ALLOPURINOL 300 MG: 300 TABLET ORAL at 09:38

## 2021-10-31 NOTE — DISCHARGE SUMMARY
Dr. Carline Mckinnon  Discharge Summary      Patient ID:  Sergio Jasmine  [id number]  64 y.o.  1960    Admit date: 10/29/2021    Discharge date and time: 10/31/2021     Admitting Physician: Arie Moser MD     Discharge Physician: Rigo Ureña NP    Admission Diagnoses: Primary osteoarthritis of left knee [M17.12]  Localized osteoarthritis of left knee [M17.12]    Discharge Diagnoses:   Problem List as of 10/31/2021 Date Reviewed: 7/15/2021        Codes Class Noted - Resolved    * (Principal) Localized osteoarthritis of left knee ICD-10-CM: M17.12  ICD-9-CM: 715.36  10/29/2021 - Present        Chronic idiopathic gout involving toe of left foot without tophus ICD-10-CM: M1A.0720  ICD-9-CM: 274.02  7/15/2021 - Present        Chronic pain of left knee ICD-10-CM: M25.562, G89.29  ICD-9-CM: 719.46, 338.29  7/15/2021 - Present        Renal insufficiency ICD-10-CM: N28.9  ICD-9-CM: 593.9  7/15/2021 - Present        Obesity, Class III, BMI 40-49.9 (morbid obesity) ([facility] ICD-10-CM: E66.01  ICD-9-CM: 278.01  8/27/2019 - Present        GERD (gastroesophageal reflux disease) ICD-10-CM: K21.9  ICD-9-CM: 530.81  5/14/2013 - Present        HLD (hyperlipidemia) ICD-10-CM: E78.5  ICD-9-CM: 272.4  11/14/2012 - Present        Obesity ICD-10-CM: E66.9  ICD-9-CM: 278.00  11/14/2012 - Present        HTN (hypertension) ICD-10-CM: I10  ICD-9-CM: 401.9  11/14/2012 - Present        TRINA on CPAP ICD-10-CM: G47.33, Z99.89  ICD-9-CM: 327.23, V46.8  11/14/2012 - Present        Gout ICD-10-CM: M10.9  ICD-9-CM: 274.9  11/14/2012 - Present        Osteoarthritis ICD-10-CM: M19.90  ICD-9-CM: 715.90  11/14/2012 - Present        Dyspnea ICD-10-CM: R06.00  ICD-9-CM: 786.09  11/14/2012 - Present        CAD (coronary artery disease) ICD-10-CM: I25.10  ICD-9-CM: 414.00  11/14/2012 - Present              Surgeon: Rigo Ureña NP    Preoperative Medical Clearance: NA                          Perioperative Antibiotics: Ancef  ___ Vancomycin  __x_      Postoperative Pain Management:  Oxycodone 5 mg take 1 tablet as needed every 4 hours as needed-setn to his pharmacy    DVT Prophylaxis:  ASA 81 mg BID for 30 days                                  TERE Rodrigues                                  Plexi-Pulse    Postoperative transfusions:     none Banked PRBCs     Post Op complications: none    Hemoglobin at discharge:   Lab Results   Component Value Date/Time    HGB 13.4 (L) 10/30/2021 05:01 AM       Wound appears to be healing without any evidence of infection. Physical Therapy started on the day following surgery and progressed to independent ambulation with the aid of a walker. At the time of discharge, able to go up and down stairs and had understanding of precautions needed following surgery. PT at time of DC: full extension, 85 degrees flexion    Current Discharge Medication List      CONTINUE these medications which have NOT CHANGED    Details   aspirin delayed-release 81 mg tablet Take 81 mg by mouth daily. FLUoxetine (PROzac) 20 mg capsule Take 60 mg by mouth daily. prazosin (MINIPRESS) 2 mg capsule Take 4 mg by mouth nightly. diclofenac EC (VOLTAREN) 50 mg EC tablet Take 50 mg by mouth two (2) times a day. pantoprazole (PROTONIX) 40 mg tablet Take 40 mg by mouth two (2) times a day. allopurinoL (ZYLOPRIM) 300 mg tablet Take 300 mg by mouth two (2) times a day. lisinopril-hydroCHLOROthiazide (PRINZIDE, ZESTORETIC) 20-12.5 mg per tablet Take 2 Tablets by mouth daily. zolpidem (Ambien) 10 mg tablet Take 10 mg by mouth nightly as needed for Sleep. atorvastatin (LIPITOR) 40 mg tablet Take 1 Tablet by mouth daily.   Qty: 30 Tablet, Refills: 3    Associated Diagnoses: Mixed hyperlipidemia             Discharged to: Home    Discharge instructions:  - Anticoagulate with: ASA 81 mg BID for 30 days  -Resume pre hospital diet             -Resume home medications per medical continuation form     -Ambulate with walker, appropriate total joint protocol  -Follow up in office as scheduled in 2 weeks with Dr Petty [facility] referral PT only    Signed:  Ayush Reyes NP  10/31/2021  1:38 PM

## 2021-10-31 NOTE — PROGRESS NOTES
Problem: Mobility Impaired (Adult and Pediatric)  Goal: *Acute Goals and Plan of Care (Insert Text)  Outcome: Progressing Towards Goal  Note: GOALS (1-4 days):  (1.)Mr. Lauri Dela Cruz will move from supine to sit and sit to supine  in bed with SUPERVISION. (2.)Mr. Lauri Dela Cruz will transfer from bed to chair and chair to bed with SUPERVISION using the least restrictive device. (3.)Mr. Lauri Dela Cruz will ambulate with SUPERVISION for 300 feet with the least restrictive device. (4.)Mr. Lauri Dela Cruz will ambulate up/down 3 steps with bilateral  railing with CONTACT GUARD ASSIST with no device. (5.)Mr. Lauri Dela Cruz will increase left knee ROM to 0°-90°.  ________________________________________________________________________________________________      PHYSICAL THERAPY JOINT CAMP TKA: Daily Note, Treatment Day: 1st and AM 10/31/2021  OBSERVATION: Hospital Day: 3  Payor: Medford Habermann / Plan: [employer] [address] / Product Type: [employer] /      NAME/AGE/GENDER: Nicole Foster is a 64 y.o. male   PRIMARY DIAGNOSIS:  Primary osteoarthritis of left knee [M17.12]   Procedure(s) and Anesthesia Type:     * LEFT KNEE ARTHROPLASTY TOTAL - Spinal (Left)  ICD-10: Treatment Diagnosis:    · Pain in Left Knee (M25.562)  · Stiffness of Left Knee, Not elsewhere classified (M25.662)  · Difficulty in walking, Not elsewhere classified (R26.2)      ASSESSMENT:     Mr. Lauri Dela Cruz presents with decreased strength and range of motion left lower extremity and with decreased independence with functional mobility s/p left TKA. Pt had his right knee replaced in March of this year. Pt will benefit from skilled PT interventions to maximize independence with functional mobility and TKA management. Pt did well with assessment. Worked on bed mobility today, sit to stand and gait training in the quintana. Pt with low BP this am limiting his gait. Back in room in recliner worked on TKA exercises with verbal cues.  Pt stayed up in recliner with ice on knee and needs in reach. Pt instructed not to get up without assistance. Hope to progress mobility and exercises in the morning. Pt plans to discharge to home with continued therapy for follow up.   10/30- pt supine on contact and sleeping, woke easily and agreeable. Worked on his TKA exercises in supine with verbal cues progressing with repetitions. Then supine to sit with SBA and worked on gait training in the quintana with verbal cues making good progress with distance. Back in room he wanted to get back in the bed and stretch back out. He stayed in supine with ice on knee and needs in reach. Will continue to follow. 10/31- pt supine on contact and agreeable to therapy. Said he slept well last night. Doing well with supine to sit to stand. Walked in quintana progressing with distance. Doing well with reciprocal gait pattern with cues. In gym practiced going up and down 3 steps with rails and verbal cues. Back in room in recliner worked on his TKA exercises with verbal cues progressing with repetitions. Gave pt written hand out of exercises to use until HHPT follows up. Reviewed frequency and use of ice. Pt hopes to discharge home today. This section established at most recent assessment   PROBLEM LIST (Impairments causing functional limitations):  1. Decreased Strength  2. Decreased ADL/Functional Activities  3. Decreased Transfer Abilities  4. Decreased Ambulation Ability/Technique  5. Decreased Flexibility/Joint Mobility  6. Edema/Girth  7. Decreased Carter with Home Exercise Program   INTERVENTIONS PLANNED: (Benefits and precautions of physical therapy have been discussed with the patient.)  1. Bed Mobility  2. Cold  3. Gait Training  4. Home Exercise Program (HEP)  5. Range of Motion (ROM)  6. Therapeutic Activites  7. Therapeutic Exercise/Strengthening  8. Transfer Training     TREATMENT PLAN: Frequency/Duration: Follow patient BID for duration of hospital stay to address above goals.    Rehabilitation Potential For Stated Goals: Good     RECOMMENDED REHABILITATION/EQUIPMENT: (at time of discharge pending progress): Continue Skilled Therapy, Home Health: Physical Therapy, and Outpatient: Physical Therapy. HISTORY:   History of Present Injury/Illness (Reason for Referral):  Pt s/p total knee arthroplasty on 10/29/2021  Past Medical History/Comorbidities:   Mr. Brittanie Smith  has a past medical history of CAD (coronary artery disease) (11/14/2012), Chronic kidney disease (07/12/2021), Dyspnea (11/14/2012), GERD (gastroesophageal reflux disease), Gout, Hiatal hernia, History of COVID-19 (10/2020), History of EKG (07/12/2021), HLD (hyperlipidemia) (11/14/2012), HTN (hypertension) (11/14/2012), cardiovascular stress test (07/13/2021), Hypercholesteremia, Obesity (11/14/2012), Osteoarthritis (11/14/2012), Psychiatric disorder, and Sleep apnea (11/14/2012). He also has no past medical history of Malignant hyperthermia due to anesthesia or Pseudocholinesterase deficiency. Mr. Brittanie Smith  has a past surgical history that includes hx appendectomy; hx cholecystectomy; hx carpal tunnel release; hx lithotripsy; hx orthopaedic; hx knee replacement (Right, 03/08/2021); pr cardiac surg procedure unlist; hx endoscopy (09/14/2021); and hx colonoscopy (07/14/2021).   Social History/Living Environment:   Home Environment: Trailer/mobile home  # Steps to Enter: 2  One/Two Story Residence: One story  Living Alone: No  Support Systems: Spouse/Significant Other  Patient Expects to be Discharged to[de-identified] Trailer/mobile home  Current DME Used/Available at Home: Cane, straight, Raised toilet seat, Walker, rolling  Tub or Shower Type: Shower  Prior Level of Function/Work/Activity:  Pt living at home with spouse   Number of Personal Factors/Comorbidities that affect the Plan of Care: 0: LOW COMPLEXITY   EXAMINATION:   Most Recent Physical Functioning:                            Bed Mobility  Supine to Sit: Supervision    Transfers  Sit to Stand: Supervision;Stand-by assistance  Stand to Sit: Supervision;Stand-by assistance    Balance  Sitting: Intact  Standing: Intact; With support         Gait Training: Yes    Weight Bearing Status  Left Side Weight Bearing: As tolerated  Distance (ft): 240 Feet (ft)  Ambulation - Level of Assistance: Stand-by assistance  Assistive Device: Walker, rolling  Speed/Siobhan: Pace decreased (<100 feet/min)  Step Length: Right shortened  Stance: Left decreased  Gait Abnormalities: Antalgic;Decreased step clearance  Number of Stairs Trained: 3  Stairs - Level of Assistance: Stand-by assistance;Contact guard assistance  Rail Use: Both  Interventions: Safety awareness training;Verbal cues     Braces/Orthotics: none    Left Knee Cold  Type: Cryocuff  Patient Position: Sitting      Body Structures Involved:  1. Joints  2. Muscles Body Functions Affected:  1. Movement Related Activities and Participation Affected:  1. Mobility  2. Self Care   Number of elements that affect the Plan of Care: 4+: HIGH COMPLEXITY   CLINICAL PRESENTATION:   Presentation: Stable and uncomplicated: LOW COMPLEXITY   CLINICAL DECISION MAKIN hospitals [address] [id number] AM-PAC 6 Clicks   Basic Mobility Inpatient Short Form  How much difficulty does the patient currently have. .. Unable A Lot A Little None   1. Turning over in bed (including adjusting bedclothes, sheets and blankets)? [] 1   [] 2   [x] 3   [] 4   2. Sitting down on and standing up from a chair with arms ( e.g., wheelchair, bedside commode, etc.)   [] 1   [] 2   [x] 3   [] 4   3. Moving from lying on back to sitting on the side of the bed? [] 1   [] 2   [x] 3   [] 4   How much help from another person does the patient currently need. .. Total A Lot A Little None   4. Moving to and from a bed to a chair (including a wheelchair)? [] 1   [] 2   [x] 3   [] 4   5. Need to walk in hospital room? [] 1   [] 2   [x] 3   [] 4   6. Climbing 3-5 steps with a railing?    [] 1   [] 2   [x] 3   [] 4   © 2007, Trustees of [address] [place] [address] [address] [id number], under license to [employer]. All rights reserved     Score:  Initial: 18 Most Recent: X (Date: -- )    Interpretation of Tool:  Represents activities that are increasingly more difficult (i.e. Bed mobility, Transfers, Gait). Medical Necessity:     · Patient is expected to demonstrate progress in   · strength, range of motion, and functional technique  ·  to   · decrease assistance required with functional mobility and TKA managment  · .  Reason for Services/Other Comments:  · Patient continues to require skilled intervention due to   · Inability to complete functional mobility and TKA management independently  · . Use of outcome tool(s) and clinical judgement create a POC that gives a: Clear prediction of patient's progress: LOW COMPLEXITY            TREATMENT:   (In addition to Assessment/Re-Assessment sessions the following treatments were rendered)     Pre-treatment Symptoms/Complaints:  none  Pain Initial:   Pain Intensity 1:  (sore, had iv pain meds earlier)  Post Session: no reports of pain     Gait Training (15 Minutes):  Gait training to improve and/or restore physical functioning as related to mobility and strength. Ambulated 240 Feet (ft) with Stand-by assistance using a Walker, rolling and minimal Safety awareness training;Verbal cues related to their stance phase and stride length to promote proper body alignment and promote proper body posture. Instruction in performance of walker use and gait sequencing to correct stance phase and stride length. Therapeutic Exercise: (15 Minutes):  Exercises per grid below to improve mobility and strength. Required minimal verbal cues to promote proper body alignment and promote proper body posture. Progressed repetitions as indicated.        Date:  10/30 Date:  10/31 Date:     ACTIVITY/EXERCISE AM PM AM PM AM PM     []  []  []  []  []  []   Ankle Pumps 10 15 20      Quad Sets 10 15 20      Gluteal Sets 10 15 20 Hip ABd/ADduction 10 15 20      Straight Leg Raises 10 15 20      Knee Slides 10 15 20      Short Arc Quads 10 15 20      Chair Slides                           B = bilateral; AA = active assistive; A = active; P = passive      Treatment/Session Assessment:     Response to Treatment:  pt tolerated well, good progress    Education:  [x] Home Exercises  [x] Fall Precautions  [x] Use of Cold Therapy Unit [x] D/C Instruction Review  [] Knee Prosthesis Review  [x] Walker Management/Safety [] Adaptive Equipment as Needed  [x] No pillow under knee       Interdisciplinary Collaboration:   o Registered Nurse    After treatment position/precautions:   o Up in chair  o Bed/Chair-wheels locked  o Call light within reach    Compliance with Program/Exercises: Compliant all of the time, Will assess as treatment progresses. Recommendations/Intent for next treatment session:  Treatment next visit will focus on increasing Mr. Siddiquis independence with bed mobility, transfers, gait training, strength/ROM exercises, modalities for pain, and patient education.       Total Treatment Duration:  PT Patient Time In/Time Out  Time In: 3008  Time Out: [address], PT

## 2021-11-02 ENCOUNTER — HOME CARE VISIT (OUTPATIENT)
Dept: SCHEDULING | Facility: HOME HEALTH | Age: 61
End: 2021-11-02
Payer: MEDICARE

## 2021-11-02 PROCEDURE — 400013 HH SOC

## 2021-11-02 PROCEDURE — G0151 HHCP-SERV OF PT,EA 15 MIN: HCPCS

## 2021-11-03 ENCOUNTER — HOME CARE VISIT (OUTPATIENT)
Dept: SCHEDULING | Facility: HOME HEALTH | Age: 61
End: 2021-11-03
Payer: MEDICARE

## 2021-11-03 VITALS
OXYGEN SATURATION: 98 % | HEART RATE: 84 BPM | DIASTOLIC BLOOD PRESSURE: 84 MMHG | TEMPERATURE: 98 F | SYSTOLIC BLOOD PRESSURE: 134 MMHG | RESPIRATION RATE: 18 BRPM

## 2021-11-03 PROCEDURE — G0151 HHCP-SERV OF PT,EA 15 MIN: HCPCS

## 2021-11-05 ENCOUNTER — HOME CARE VISIT (OUTPATIENT)
Dept: SCHEDULING | Facility: HOME HEALTH | Age: 61
End: 2021-11-05
Payer: MEDICARE

## 2021-11-05 VITALS
RESPIRATION RATE: 17 BRPM | HEART RATE: 77 BPM | TEMPERATURE: 98.5 F | SYSTOLIC BLOOD PRESSURE: 132 MMHG | OXYGEN SATURATION: 98 % | DIASTOLIC BLOOD PRESSURE: 78 MMHG

## 2021-11-05 PROCEDURE — G0151 HHCP-SERV OF PT,EA 15 MIN: HCPCS

## 2021-11-06 VITALS
DIASTOLIC BLOOD PRESSURE: 84 MMHG | TEMPERATURE: 98.3 F | SYSTOLIC BLOOD PRESSURE: 132 MMHG | RESPIRATION RATE: 18 BRPM | HEART RATE: 83 BPM | OXYGEN SATURATION: 98 %

## 2021-11-09 ENCOUNTER — HOME CARE VISIT (OUTPATIENT)
Dept: SCHEDULING | Facility: HOME HEALTH | Age: 61
End: 2021-11-09
Payer: MEDICARE

## 2021-11-09 VITALS
HEART RATE: 87 BPM | TEMPERATURE: 98.2 F | SYSTOLIC BLOOD PRESSURE: 118 MMHG | RESPIRATION RATE: 16 BRPM | DIASTOLIC BLOOD PRESSURE: 70 MMHG

## 2021-11-09 PROCEDURE — G0157 HHC PT ASSISTANT EA 15: HCPCS

## 2021-11-11 ENCOUNTER — HOME CARE VISIT (OUTPATIENT)
Dept: SCHEDULING | Facility: HOME HEALTH | Age: 61
End: 2021-11-11
Payer: MEDICARE

## 2021-11-11 VITALS
DIASTOLIC BLOOD PRESSURE: 72 MMHG | SYSTOLIC BLOOD PRESSURE: 111 MMHG | RESPIRATION RATE: 18 BRPM | HEART RATE: 83 BPM | TEMPERATURE: 98.3 F

## 2021-11-11 PROCEDURE — G0157 HHC PT ASSISTANT EA 15: HCPCS

## 2021-11-12 ENCOUNTER — HOME CARE VISIT (OUTPATIENT)
Dept: SCHEDULING | Facility: HOME HEALTH | Age: 61
End: 2021-11-12
Payer: MEDICARE

## 2021-11-12 PROCEDURE — G0151 HHCP-SERV OF PT,EA 15 MIN: HCPCS

## 2021-11-14 VITALS
HEART RATE: 83 BPM | SYSTOLIC BLOOD PRESSURE: 116 MMHG | RESPIRATION RATE: 17 BRPM | TEMPERATURE: 98.4 F | DIASTOLIC BLOOD PRESSURE: 82 MMHG

## 2021-11-15 ENCOUNTER — HOME CARE VISIT (OUTPATIENT)
Dept: SCHEDULING | Facility: HOME HEALTH | Age: 61
End: 2021-11-15
Payer: MEDICARE

## 2021-11-15 ENCOUNTER — HOME CARE VISIT (OUTPATIENT)
Dept: HOME HEALTH SERVICES | Facility: HOME HEALTH | Age: 61
End: 2021-11-15
Payer: MEDICARE

## 2021-11-15 VITALS
OXYGEN SATURATION: 97 % | RESPIRATION RATE: 18 BRPM | TEMPERATURE: 96.8 F | DIASTOLIC BLOOD PRESSURE: 68 MMHG | HEART RATE: 76 BPM | SYSTOLIC BLOOD PRESSURE: 128 MMHG

## 2021-11-15 PROCEDURE — G0157 HHC PT ASSISTANT EA 15: HCPCS

## 2021-11-17 ENCOUNTER — HOME CARE VISIT (OUTPATIENT)
Dept: SCHEDULING | Facility: HOME HEALTH | Age: 61
End: 2021-11-17
Payer: MEDICARE

## 2021-11-17 PROCEDURE — G0151 HHCP-SERV OF PT,EA 15 MIN: HCPCS

## 2021-11-18 VITALS
SYSTOLIC BLOOD PRESSURE: 132 MMHG | RESPIRATION RATE: 18 BRPM | TEMPERATURE: 97.1 F | OXYGEN SATURATION: 98 % | HEART RATE: 73 BPM | DIASTOLIC BLOOD PRESSURE: 80 MMHG

## 2021-11-27 ENCOUNTER — HOSPITAL ENCOUNTER (INPATIENT)
Age: 61
LOS: 5 days | Discharge: HOME HEALTH | DRG: 493 | End: 2021-12-02
Attending: ORTHOPAEDIC SURGERY | Admitting: ORTHOPAEDIC SURGERY
Payer: MEDICARE

## 2021-11-27 PROBLEM — M25.562 LEFT KNEE PAIN: Status: ACTIVE | Noted: 2021-11-27

## 2021-11-27 PROCEDURE — 65270000029 HC RM PRIVATE

## 2021-11-27 PROCEDURE — 74011250637 HC RX REV CODE- 250/637: Performed by: ORTHOPAEDIC SURGERY

## 2021-11-27 PROCEDURE — 74011250636 HC RX REV CODE- 250/636: Performed by: ORTHOPAEDIC SURGERY

## 2021-11-27 RX ORDER — FLUOXETINE 10 MG/1
40 CAPSULE ORAL DAILY
Status: DISCONTINUED | OUTPATIENT
Start: 2021-11-28 | End: 2021-11-28

## 2021-11-27 RX ORDER — ASPIRIN 81 MG/1
81 TABLET ORAL 2 TIMES DAILY
Status: DISCONTINUED | OUTPATIENT
Start: 2021-11-28 | End: 2021-11-28

## 2021-11-27 RX ORDER — ALLOPURINOL 300 MG/1
300 TABLET ORAL DAILY
Status: DISCONTINUED | OUTPATIENT
Start: 2021-11-28 | End: 2021-11-28

## 2021-11-27 RX ORDER — PRAZOSIN HYDROCHLORIDE 1 MG/1
2 CAPSULE ORAL
Status: DISCONTINUED | OUTPATIENT
Start: 2021-11-28 | End: 2021-11-28

## 2021-11-27 RX ORDER — CHLORTHALIDONE 25 MG/1
25 TABLET ORAL DAILY
Status: DISCONTINUED | OUTPATIENT
Start: 2021-11-28 | End: 2021-11-28

## 2021-11-27 RX ORDER — ATORVASTATIN CALCIUM 40 MG/1
40 TABLET, FILM COATED ORAL DAILY
Status: DISCONTINUED | OUTPATIENT
Start: 2021-11-28 | End: 2021-11-28

## 2021-11-27 RX ORDER — HYDROMORPHONE HYDROCHLORIDE 2 MG/1
1 TABLET ORAL
Status: DISCONTINUED | OUTPATIENT
Start: 2021-11-27 | End: 2021-11-28

## 2021-11-27 RX ORDER — PANTOPRAZOLE SODIUM 40 MG/1
40 TABLET, DELAYED RELEASE ORAL 2 TIMES DAILY
Status: DISCONTINUED | OUTPATIENT
Start: 2021-11-28 | End: 2021-11-28

## 2021-11-27 RX ORDER — SODIUM CHLORIDE, SODIUM LACTATE, POTASSIUM CHLORIDE, CALCIUM CHLORIDE 600; 310; 30; 20 MG/100ML; MG/100ML; MG/100ML; MG/100ML
75 INJECTION, SOLUTION INTRAVENOUS CONTINUOUS
Status: DISCONTINUED | OUTPATIENT
Start: 2021-11-28 | End: 2021-11-28

## 2021-11-27 RX ORDER — LISINOPRIL AND HYDROCHLOROTHIAZIDE 12.5; 2 MG/1; MG/1
1 TABLET ORAL DAILY
Status: DISCONTINUED | OUTPATIENT
Start: 2021-11-28 | End: 2021-11-28

## 2021-11-27 RX ORDER — ZOLPIDEM TARTRATE 5 MG/1
5 TABLET ORAL
Status: DISCONTINUED | OUTPATIENT
Start: 2021-11-27 | End: 2021-11-28

## 2021-11-27 RX ORDER — ZOLPIDEM TARTRATE 5 MG/1
10 TABLET ORAL
Status: DISCONTINUED | OUTPATIENT
Start: 2021-11-27 | End: 2021-11-27 | Stop reason: DRUGHIGH

## 2021-11-27 RX ADMIN — PRAZOSIN HYDROCHLORIDE 2 MG: 1 CAPSULE ORAL at 23:55

## 2021-11-27 RX ADMIN — SODIUM CHLORIDE, SODIUM LACTATE, POTASSIUM CHLORIDE, AND CALCIUM CHLORIDE 75 ML/HR: 600; 310; 30; 20 INJECTION, SOLUTION INTRAVENOUS at 23:44

## 2021-11-27 RX ADMIN — HYDROMORPHONE HYDROCHLORIDE 1 MG: 2 TABLET ORAL at 23:43

## 2021-11-27 RX ADMIN — ZOLPIDEM TARTRATE 5 MG: 5 TABLET ORAL at 23:56

## 2021-11-28 ENCOUNTER — ANESTHESIA EVENT (OUTPATIENT)
Dept: SURGERY | Age: 61
DRG: 493 | End: 2021-11-28
Payer: MEDICARE

## 2021-11-28 ENCOUNTER — APPOINTMENT (OUTPATIENT)
Dept: GENERAL RADIOLOGY | Age: 61
DRG: 493 | End: 2021-11-28
Attending: INTERNAL MEDICINE
Payer: MEDICARE

## 2021-11-28 ENCOUNTER — APPOINTMENT (OUTPATIENT)
Dept: ULTRASOUND IMAGING | Age: 61
DRG: 493 | End: 2021-11-28
Attending: NURSE PRACTITIONER
Payer: MEDICARE

## 2021-11-28 ENCOUNTER — ANESTHESIA (OUTPATIENT)
Dept: SURGERY | Age: 61
DRG: 493 | End: 2021-11-28
Payer: MEDICARE

## 2021-11-28 PROBLEM — M00.9 SEPTIC ARTHRITIS OF KNEE, LEFT (HCC): Status: ACTIVE | Noted: 2021-11-28

## 2021-11-28 PROBLEM — N18.30 STAGE 3 CHRONIC KIDNEY DISEASE (HCC): Status: ACTIVE | Noted: 2021-07-15

## 2021-11-28 LAB
APPEARANCE FLD: NORMAL
COLOR FLD: YELLOW
COVID-19 RAPID TEST, COVR: NOT DETECTED
LYMPHOCYTES NFR BRONCH MANUAL: 6 %
MACROPHAGES NFR BRONCH MANUAL: 2 %
NEUTROPHILS NFR BRONCH MANUAL: 92 %
NUC CELL # FLD: NORMAL /CU MM
RBC # FLD: NORMAL /CU MM
SOURCE, COVRS: NORMAL
SPECIMEN SOURCE FLD: NORMAL

## 2021-11-28 PROCEDURE — 76942 ECHO GUIDE FOR BIOPSY: CPT | Performed by: ORTHOPAEDIC SURGERY

## 2021-11-28 PROCEDURE — 2709999900 HC NON-CHARGEABLE SUPPLY

## 2021-11-28 PROCEDURE — 87205 SMEAR GRAM STAIN: CPT

## 2021-11-28 PROCEDURE — 74011250636 HC RX REV CODE- 250/636: Performed by: ORTHOPAEDIC SURGERY

## 2021-11-28 PROCEDURE — 77030040922 HC BLNKT HYPOTHRM STRY -A: Performed by: ANESTHESIOLOGY

## 2021-11-28 PROCEDURE — 74011250637 HC RX REV CODE- 250/637: Performed by: ORTHOPAEDIC SURGERY

## 2021-11-28 PROCEDURE — 76210000006 HC OR PH I REC 0.5 TO 1 HR: Performed by: ORTHOPAEDIC SURGERY

## 2021-11-28 PROCEDURE — 77030019908 HC STETH ESOPH SIMS -A: Performed by: ANESTHESIOLOGY

## 2021-11-28 PROCEDURE — 77030003602 HC NDL NRV BLK BBMI -B: Performed by: ANESTHESIOLOGY

## 2021-11-28 PROCEDURE — 77030039425 HC BLD LARYNG TRULITE DISP TELE -A: Performed by: ANESTHESIOLOGY

## 2021-11-28 PROCEDURE — 89050 BODY FLUID CELL COUNT: CPT

## 2021-11-28 PROCEDURE — 77030037088 HC TUBE ENDOTRACH ORAL NSL COVD-A: Performed by: ANESTHESIOLOGY

## 2021-11-28 PROCEDURE — 87635 SARS-COV-2 COVID-19 AMP PRB: CPT

## 2021-11-28 PROCEDURE — 77030031139 HC SUT VCRL2 J&J -A: Performed by: ORTHOPAEDIC SURGERY

## 2021-11-28 PROCEDURE — 76010010054 HC POST OP PAIN BLOCK: Performed by: ORTHOPAEDIC SURGERY

## 2021-11-28 PROCEDURE — 74011250636 HC RX REV CODE- 250/636: Performed by: ANESTHESIOLOGY

## 2021-11-28 PROCEDURE — 77030002916 HC SUT ETHLN J&J -A: Performed by: ORTHOPAEDIC SURGERY

## 2021-11-28 PROCEDURE — 77030038552 HC DRN WND MDII -A: Performed by: ORTHOPAEDIC SURGERY

## 2021-11-28 PROCEDURE — 87186 SC STD MICRODIL/AGAR DIL: CPT

## 2021-11-28 PROCEDURE — 74011000250 HC RX REV CODE- 250: Performed by: ANESTHESIOLOGY

## 2021-11-28 PROCEDURE — 87077 CULTURE AEROBIC IDENTIFY: CPT

## 2021-11-28 PROCEDURE — 65270000029 HC RM PRIVATE

## 2021-11-28 PROCEDURE — 93971 EXTREMITY STUDY: CPT

## 2021-11-28 PROCEDURE — 2709999900 HC NON-CHARGEABLE SUPPLY: Performed by: ORTHOPAEDIC SURGERY

## 2021-11-28 PROCEDURE — C1776 JOINT DEVICE (IMPLANTABLE): HCPCS | Performed by: ORTHOPAEDIC SURGERY

## 2021-11-28 PROCEDURE — 74011250637 HC RX REV CODE- 250/637: Performed by: NURSE PRACTITIONER

## 2021-11-28 PROCEDURE — 76060000035 HC ANESTHESIA 2 TO 2.5 HR: Performed by: ORTHOPAEDIC SURGERY

## 2021-11-28 PROCEDURE — 71045 X-RAY EXAM CHEST 1 VIEW: CPT

## 2021-11-28 PROCEDURE — 74011250637 HC RX REV CODE- 250/637: Performed by: ANESTHESIOLOGY

## 2021-11-28 PROCEDURE — 0QBH0ZZ EXCISION OF LEFT TIBIA, OPEN APPROACH: ICD-10-PCS | Performed by: ORTHOPAEDIC SURGERY

## 2021-11-28 PROCEDURE — 77030000032 HC CUF TRNQT ZIMM -B: Performed by: ORTHOPAEDIC SURGERY

## 2021-11-28 PROCEDURE — 74011000258 HC RX REV CODE- 258: Performed by: ORTHOPAEDIC SURGERY

## 2021-11-28 PROCEDURE — 76010000171 HC OR TIME 2 TO 2.5 HR INTENSV-TIER 1: Performed by: ORTHOPAEDIC SURGERY

## 2021-11-28 PROCEDURE — 77030008462 HC STPLR SKN PROX J&J -A: Performed by: ORTHOPAEDIC SURGERY

## 2021-11-28 RX ORDER — ONDANSETRON 2 MG/ML
INJECTION INTRAMUSCULAR; INTRAVENOUS AS NEEDED
Status: DISCONTINUED | OUTPATIENT
Start: 2021-11-28 | End: 2021-11-28 | Stop reason: HOSPADM

## 2021-11-28 RX ORDER — OXYCODONE HYDROCHLORIDE 5 MG/1
10 TABLET ORAL
Status: COMPLETED | OUTPATIENT
Start: 2021-11-28 | End: 2021-11-28

## 2021-11-28 RX ORDER — PRAZOSIN HYDROCHLORIDE 1 MG/1
4 CAPSULE ORAL
Status: DISCONTINUED | OUTPATIENT
Start: 2021-11-28 | End: 2021-12-02 | Stop reason: HOSPADM

## 2021-11-28 RX ORDER — MORPHINE SULFATE 2 MG/ML
2 INJECTION, SOLUTION INTRAMUSCULAR; INTRAVENOUS
Status: DISCONTINUED | OUTPATIENT
Start: 2021-11-28 | End: 2021-12-02 | Stop reason: HOSPADM

## 2021-11-28 RX ORDER — ATORVASTATIN CALCIUM 40 MG/1
40 TABLET, FILM COATED ORAL DAILY
Status: DISCONTINUED | OUTPATIENT
Start: 2021-11-29 | End: 2021-12-02 | Stop reason: HOSPADM

## 2021-11-28 RX ORDER — SODIUM CHLORIDE 0.9 % (FLUSH) 0.9 %
5-40 SYRINGE (ML) INJECTION EVERY 8 HOURS
Status: DISCONTINUED | OUTPATIENT
Start: 2021-11-28 | End: 2021-12-02 | Stop reason: HOSPADM

## 2021-11-28 RX ORDER — CHLORTHALIDONE 25 MG/1
25 TABLET ORAL
Status: DISCONTINUED | OUTPATIENT
Start: 2021-11-28 | End: 2021-11-28

## 2021-11-28 RX ORDER — LIDOCAINE HYDROCHLORIDE 10 MG/ML
0.3 INJECTION INFILTRATION; PERINEURAL ONCE
Status: DISCONTINUED | OUTPATIENT
Start: 2021-11-28 | End: 2021-11-28 | Stop reason: HOSPADM

## 2021-11-28 RX ORDER — ACETAMINOPHEN 325 MG/1
975 TABLET ORAL ONCE
Status: COMPLETED | OUTPATIENT
Start: 2021-11-28 | End: 2021-11-28

## 2021-11-28 RX ORDER — NALOXONE HYDROCHLORIDE 0.4 MG/ML
.2-.4 INJECTION, SOLUTION INTRAMUSCULAR; INTRAVENOUS; SUBCUTANEOUS
Status: DISCONTINUED | OUTPATIENT
Start: 2021-11-28 | End: 2021-12-02 | Stop reason: HOSPADM

## 2021-11-28 RX ORDER — HYDROMORPHONE HYDROCHLORIDE 1 MG/ML
2 INJECTION, SOLUTION INTRAMUSCULAR; INTRAVENOUS; SUBCUTANEOUS
Status: DISCONTINUED | OUTPATIENT
Start: 2021-11-28 | End: 2021-12-02 | Stop reason: HOSPADM

## 2021-11-28 RX ORDER — SODIUM CHLORIDE, SODIUM LACTATE, POTASSIUM CHLORIDE, CALCIUM CHLORIDE 600; 310; 30; 20 MG/100ML; MG/100ML; MG/100ML; MG/100ML
100 INJECTION, SOLUTION INTRAVENOUS CONTINUOUS
Status: DISCONTINUED | OUTPATIENT
Start: 2021-11-28 | End: 2021-11-28 | Stop reason: HOSPADM

## 2021-11-28 RX ORDER — DEXAMETHASONE SODIUM PHOSPHATE 4 MG/ML
INJECTION, SOLUTION INTRA-ARTICULAR; INTRALESIONAL; INTRAMUSCULAR; INTRAVENOUS; SOFT TISSUE
Status: COMPLETED | OUTPATIENT
Start: 2021-11-28 | End: 2021-11-28

## 2021-11-28 RX ORDER — HYDROMORPHONE HYDROCHLORIDE 2 MG/1
2 TABLET ORAL
Status: DISCONTINUED | OUTPATIENT
Start: 2021-11-28 | End: 2021-12-02 | Stop reason: HOSPADM

## 2021-11-28 RX ORDER — SODIUM CHLORIDE 9 MG/ML
100 INJECTION, SOLUTION INTRAVENOUS CONTINUOUS
Status: DISPENSED | OUTPATIENT
Start: 2021-11-28 | End: 2021-11-30

## 2021-11-28 RX ORDER — FENTANYL CITRATE 50 UG/ML
INJECTION, SOLUTION INTRAMUSCULAR; INTRAVENOUS AS NEEDED
Status: DISCONTINUED | OUTPATIENT
Start: 2021-11-28 | End: 2021-11-28 | Stop reason: HOSPADM

## 2021-11-28 RX ORDER — ACETAMINOPHEN 500 MG
1000 TABLET ORAL EVERY 6 HOURS
Status: DISCONTINUED | OUTPATIENT
Start: 2021-11-29 | End: 2021-12-02 | Stop reason: HOSPADM

## 2021-11-28 RX ORDER — PANTOPRAZOLE SODIUM 40 MG/1
40 TABLET, DELAYED RELEASE ORAL 2 TIMES DAILY
Status: DISCONTINUED | OUTPATIENT
Start: 2021-11-28 | End: 2021-12-02 | Stop reason: HOSPADM

## 2021-11-28 RX ORDER — DIPHENHYDRAMINE HCL 25 MG
25 CAPSULE ORAL
Status: DISCONTINUED | OUTPATIENT
Start: 2021-11-28 | End: 2021-12-02 | Stop reason: HOSPADM

## 2021-11-28 RX ORDER — LISINOPRIL AND HYDROCHLOROTHIAZIDE 12.5; 2 MG/1; MG/1
2 TABLET ORAL DAILY
Status: DISCONTINUED | OUTPATIENT
Start: 2021-11-29 | End: 2021-11-28

## 2021-11-28 RX ORDER — FLUOXETINE HYDROCHLORIDE 20 MG/1
60 CAPSULE ORAL DAILY
Status: DISCONTINUED | OUTPATIENT
Start: 2021-11-29 | End: 2021-12-02 | Stop reason: HOSPADM

## 2021-11-28 RX ORDER — LIDOCAINE HYDROCHLORIDE 20 MG/ML
INJECTION, SOLUTION EPIDURAL; INFILTRATION; INTRACAUDAL; PERINEURAL AS NEEDED
Status: DISCONTINUED | OUTPATIENT
Start: 2021-11-28 | End: 2021-11-28 | Stop reason: HOSPADM

## 2021-11-28 RX ORDER — PROPOFOL 10 MG/ML
INJECTION, EMULSION INTRAVENOUS AS NEEDED
Status: DISCONTINUED | OUTPATIENT
Start: 2021-11-28 | End: 2021-11-28 | Stop reason: HOSPADM

## 2021-11-28 RX ORDER — CHLORTHALIDONE 25 MG/1
25 TABLET ORAL DAILY
Status: DISCONTINUED | OUTPATIENT
Start: 2021-11-29 | End: 2021-12-02 | Stop reason: HOSPADM

## 2021-11-28 RX ORDER — AMOXICILLIN 250 MG
2 CAPSULE ORAL DAILY
Status: DISCONTINUED | OUTPATIENT
Start: 2021-11-29 | End: 2021-12-02 | Stop reason: HOSPADM

## 2021-11-28 RX ORDER — VANCOMYCIN 2 GRAM/500 ML IN 0.9 % SODIUM CHLORIDE INTRAVENOUS
2000 ONCE
Status: COMPLETED | OUTPATIENT
Start: 2021-11-28 | End: 2021-11-28

## 2021-11-28 RX ORDER — HYDROMORPHONE HYDROCHLORIDE 2 MG/ML
0.5 INJECTION, SOLUTION INTRAMUSCULAR; INTRAVENOUS; SUBCUTANEOUS
Status: DISCONTINUED | OUTPATIENT
Start: 2021-11-28 | End: 2021-11-28 | Stop reason: HOSPADM

## 2021-11-28 RX ORDER — ASPIRIN 81 MG/1
81 TABLET ORAL EVERY 12 HOURS
Status: DISCONTINUED | OUTPATIENT
Start: 2021-11-28 | End: 2021-12-02 | Stop reason: HOSPADM

## 2021-11-28 RX ORDER — EPHEDRINE SULFATE/0.9% NACL/PF 50 MG/5 ML
SYRINGE (ML) INTRAVENOUS AS NEEDED
Status: DISCONTINUED | OUTPATIENT
Start: 2021-11-28 | End: 2021-11-28 | Stop reason: HOSPADM

## 2021-11-28 RX ORDER — NEOSTIGMINE METHYLSULFATE 1 MG/ML
INJECTION, SOLUTION INTRAVENOUS AS NEEDED
Status: DISCONTINUED | OUTPATIENT
Start: 2021-11-28 | End: 2021-11-28 | Stop reason: HOSPADM

## 2021-11-28 RX ORDER — GLYCOPYRROLATE 0.2 MG/ML
INJECTION INTRAMUSCULAR; INTRAVENOUS AS NEEDED
Status: DISCONTINUED | OUTPATIENT
Start: 2021-11-28 | End: 2021-11-28 | Stop reason: HOSPADM

## 2021-11-28 RX ORDER — ZOLPIDEM TARTRATE 5 MG/1
10 TABLET ORAL
Status: DISCONTINUED | OUTPATIENT
Start: 2021-11-28 | End: 2021-12-02 | Stop reason: HOSPADM

## 2021-11-28 RX ORDER — ALLOPURINOL 300 MG/1
300 TABLET ORAL 2 TIMES DAILY
Status: DISCONTINUED | OUTPATIENT
Start: 2021-11-28 | End: 2021-12-02 | Stop reason: HOSPADM

## 2021-11-28 RX ORDER — SODIUM CHLORIDE, SODIUM LACTATE, POTASSIUM CHLORIDE, CALCIUM CHLORIDE 600; 310; 30; 20 MG/100ML; MG/100ML; MG/100ML; MG/100ML
INJECTION, SOLUTION INTRAVENOUS
Status: DISCONTINUED | OUTPATIENT
Start: 2021-11-28 | End: 2021-11-28 | Stop reason: HOSPADM

## 2021-11-28 RX ORDER — FLUOXETINE HYDROCHLORIDE 20 MG/1
60 CAPSULE ORAL DAILY
Status: DISCONTINUED | OUTPATIENT
Start: 2021-11-28 | End: 2021-11-28

## 2021-11-28 RX ORDER — OXYCODONE HYDROCHLORIDE 5 MG/1
10 TABLET ORAL
Status: DISCONTINUED | OUTPATIENT
Start: 2021-11-28 | End: 2021-12-02 | Stop reason: HOSPADM

## 2021-11-28 RX ORDER — MIDAZOLAM HYDROCHLORIDE 1 MG/ML
2 INJECTION, SOLUTION INTRAMUSCULAR; INTRAVENOUS
Status: COMPLETED | OUTPATIENT
Start: 2021-11-28 | End: 2021-11-28

## 2021-11-28 RX ORDER — SUCCINYLCHOLINE CHLORIDE 20 MG/ML
INJECTION INTRAMUSCULAR; INTRAVENOUS AS NEEDED
Status: DISCONTINUED | OUTPATIENT
Start: 2021-11-28 | End: 2021-11-28 | Stop reason: HOSPADM

## 2021-11-28 RX ORDER — SODIUM CHLORIDE 0.9 % (FLUSH) 0.9 %
5-40 SYRINGE (ML) INJECTION AS NEEDED
Status: DISCONTINUED | OUTPATIENT
Start: 2021-11-28 | End: 2021-12-02 | Stop reason: HOSPADM

## 2021-11-28 RX ORDER — FENTANYL CITRATE 50 UG/ML
100 INJECTION, SOLUTION INTRAMUSCULAR; INTRAVENOUS ONCE
Status: COMPLETED | OUTPATIENT
Start: 2021-11-28 | End: 2021-11-28

## 2021-11-28 RX ORDER — CELECOXIB 100 MG/1
200 CAPSULE ORAL EVERY 12 HOURS
Status: DISCONTINUED | OUTPATIENT
Start: 2021-11-28 | End: 2021-12-02 | Stop reason: HOSPADM

## 2021-11-28 RX ORDER — ROCURONIUM BROMIDE 10 MG/ML
INJECTION, SOLUTION INTRAVENOUS AS NEEDED
Status: DISCONTINUED | OUTPATIENT
Start: 2021-11-28 | End: 2021-11-28 | Stop reason: HOSPADM

## 2021-11-28 RX ORDER — ROPIVACAINE HYDROCHLORIDE 5 MG/ML
INJECTION, SOLUTION EPIDURAL; INFILTRATION; PERINEURAL
Status: COMPLETED | OUTPATIENT
Start: 2021-11-28 | End: 2021-11-28

## 2021-11-28 RX ORDER — ACETAMINOPHEN 650 MG/1
650 SUPPOSITORY RECTAL ONCE
Status: COMPLETED | OUTPATIENT
Start: 2021-11-28 | End: 2021-11-28

## 2021-11-28 RX ORDER — VANCOMYCIN/0.9 % SOD CHLORIDE 1.5G/250ML
1500 PLASTIC BAG, INJECTION (ML) INTRAVENOUS EVERY 24 HOURS
Status: DISCONTINUED | OUTPATIENT
Start: 2021-11-29 | End: 2021-11-30

## 2021-11-28 RX ADMIN — ROCURONIUM BROMIDE 15 MG: 10 INJECTION, SOLUTION INTRAVENOUS at 14:27

## 2021-11-28 RX ADMIN — LIDOCAINE HYDROCHLORIDE 100 MG: 20 INJECTION, SOLUTION EPIDURAL; INFILTRATION; INTRACAUDAL; PERINEURAL at 13:34

## 2021-11-28 RX ADMIN — ALLOPURINOL 300 MG: 300 TABLET ORAL at 17:30

## 2021-11-28 RX ADMIN — ASPIRIN 81 MG: 81 TABLET ORAL at 21:45

## 2021-11-28 RX ADMIN — DEXAMETHASONE SODIUM PHOSPHATE 4 MG: 4 INJECTION, SOLUTION INTRAMUSCULAR; INTRAVENOUS at 13:05

## 2021-11-28 RX ADMIN — PRAZOSIN HYDROCHLORIDE 4 MG: 1 CAPSULE ORAL at 21:45

## 2021-11-28 RX ADMIN — FENTANYL CITRATE 100 MCG: 50 INJECTION INTRAMUSCULAR; INTRAVENOUS at 14:27

## 2021-11-28 RX ADMIN — FLUOXETINE 60 MG: 20 CAPSULE ORAL at 08:22

## 2021-11-28 RX ADMIN — ROCURONIUM BROMIDE 30 MG: 10 INJECTION, SOLUTION INTRAVENOUS at 13:42

## 2021-11-28 RX ADMIN — HYDROMORPHONE HYDROCHLORIDE 1 MG: 2 TABLET ORAL at 08:22

## 2021-11-28 RX ADMIN — ONDANSETRON 4 MG: 2 INJECTION INTRAMUSCULAR; INTRAVENOUS at 15:22

## 2021-11-28 RX ADMIN — PANTOPRAZOLE SODIUM 40 MG: 40 TABLET, DELAYED RELEASE ORAL at 17:28

## 2021-11-28 RX ADMIN — OXYCODONE 10 MG: 5 TABLET ORAL at 16:15

## 2021-11-28 RX ADMIN — ZOLPIDEM TARTRATE 10 MG: 5 TABLET ORAL at 21:45

## 2021-11-28 RX ADMIN — FENTANYL CITRATE 50 MCG: 50 INJECTION INTRAMUSCULAR; INTRAVENOUS at 15:36

## 2021-11-28 RX ADMIN — HYDROMORPHONE HYDROCHLORIDE 0.5 MG: 2 INJECTION INTRAMUSCULAR; INTRAVENOUS; SUBCUTANEOUS at 15:55

## 2021-11-28 RX ADMIN — PANTOPRAZOLE SODIUM 40 MG: 40 TABLET, DELAYED RELEASE ORAL at 08:23

## 2021-11-28 RX ADMIN — VANCOMYCIN HYDROCHLORIDE 2000 MG: 10 INJECTION, POWDER, LYOPHILIZED, FOR SOLUTION INTRAVENOUS at 18:00

## 2021-11-28 RX ADMIN — ALLOPURINOL 300 MG: 300 TABLET ORAL at 08:24

## 2021-11-28 RX ADMIN — PROPOFOL 200 MG: 10 INJECTION, EMULSION INTRAVENOUS at 13:34

## 2021-11-28 RX ADMIN — ACETAMINOPHEN 975 MG: 325 TABLET ORAL at 17:27

## 2021-11-28 RX ADMIN — ASPIRIN 81 MG: 81 TABLET ORAL at 08:22

## 2021-11-28 RX ADMIN — Medication 2 MG: at 15:22

## 2021-11-28 RX ADMIN — CHLORTHALIDONE 25 MG: 25 TABLET ORAL at 08:22

## 2021-11-28 RX ADMIN — SODIUM CHLORIDE, SODIUM LACTATE, POTASSIUM CHLORIDE, AND CALCIUM CHLORIDE 100 ML/HR: 600; 310; 30; 20 INJECTION, SOLUTION INTRAVENOUS at 11:54

## 2021-11-28 RX ADMIN — PIPERACILLIN SODIUM AND TAZOBACTAM SODIUM 4.5 G: 4; .5 INJECTION, POWDER, LYOPHILIZED, FOR SOLUTION INTRAVENOUS at 17:28

## 2021-11-28 RX ADMIN — SUCCINYLCHOLINE CHLORIDE 200 MG: 20 INJECTION, SOLUTION INTRAMUSCULAR; INTRAVENOUS at 13:34

## 2021-11-28 RX ADMIN — SODIUM CHLORIDE, SODIUM LACTATE, POTASSIUM CHLORIDE, AND CALCIUM CHLORIDE: 600; 310; 30; 20 INJECTION, SOLUTION INTRAVENOUS at 13:34

## 2021-11-28 RX ADMIN — ROCURONIUM BROMIDE 10 MG: 10 INJECTION, SOLUTION INTRAVENOUS at 13:34

## 2021-11-28 RX ADMIN — Medication 10 MG: at 14:30

## 2021-11-28 RX ADMIN — CEFAZOLIN 3 G: 1 INJECTION, POWDER, FOR SOLUTION INTRAVENOUS at 13:43

## 2021-11-28 RX ADMIN — LISINOPRIL AND HYDROCHLOROTHIAZIDE 1 TABLET: 12.5; 2 TABLET ORAL at 08:23

## 2021-11-28 RX ADMIN — Medication 1 AMPULE: at 21:45

## 2021-11-28 RX ADMIN — GLYCOPYRROLATE 0.4 MG: 0.2 INJECTION, SOLUTION INTRAMUSCULAR; INTRAVENOUS at 15:22

## 2021-11-28 RX ADMIN — MIDAZOLAM 2 MG: 1 INJECTION INTRAMUSCULAR; INTRAVENOUS at 13:01

## 2021-11-28 RX ADMIN — FENTANYL CITRATE 50 MCG: 50 INJECTION INTRAMUSCULAR; INTRAVENOUS at 15:26

## 2021-11-28 RX ADMIN — ROCURONIUM BROMIDE 5 MG: 10 INJECTION, SOLUTION INTRAVENOUS at 14:45

## 2021-11-28 RX ADMIN — SODIUM CHLORIDE, SODIUM LACTATE, POTASSIUM CHLORIDE, AND CALCIUM CHLORIDE: 600; 310; 30; 20 INJECTION, SOLUTION INTRAVENOUS at 14:30

## 2021-11-28 RX ADMIN — HYDROMORPHONE HYDROCHLORIDE 1 MG: 2 TABLET ORAL at 04:37

## 2021-11-28 RX ADMIN — Medication 10 ML: at 21:45

## 2021-11-28 RX ADMIN — SODIUM CHLORIDE, SODIUM LACTATE, POTASSIUM CHLORIDE, AND CALCIUM CHLORIDE 75 ML/HR: 600; 310; 30; 20 INJECTION, SOLUTION INTRAVENOUS at 11:32

## 2021-11-28 RX ADMIN — SODIUM CHLORIDE, SODIUM LACTATE, POTASSIUM CHLORIDE, AND CALCIUM CHLORIDE 75 ML/HR: 600; 310; 30; 20 INJECTION, SOLUTION INTRAVENOUS at 12:00

## 2021-11-28 RX ADMIN — SODIUM CHLORIDE 100 ML/HR: 900 INJECTION, SOLUTION INTRAVENOUS at 17:29

## 2021-11-28 RX ADMIN — OXYCODONE 10 MG: 5 TABLET ORAL at 21:45

## 2021-11-28 RX ADMIN — CELECOXIB 200 MG: 100 CAPSULE ORAL at 17:30

## 2021-11-28 RX ADMIN — FENTANYL CITRATE 100 MCG: 50 INJECTION, SOLUTION INTRAMUSCULAR; INTRAVENOUS at 13:01

## 2021-11-28 RX ADMIN — ATORVASTATIN CALCIUM 40 MG: 40 TABLET, FILM COATED ORAL at 08:24

## 2021-11-28 RX ADMIN — ROPIVACAINE HYDROCHLORIDE 10 ML: 150 INJECTION, SOLUTION EPIDURAL; INFILTRATION; PERINEURAL at 13:05

## 2021-11-28 NOTE — CONSULTS
[address] [facility] HOSPITALIST CONSULT      Patient ID:  NAME:  Ashwini Valentine    Age/Sex: 64 y.o. male  :   1960   MRN:   [id number]    Admission Date: 2021  Consult Date: 2021  Chief Complaint: Left knee pain and swelling  Reason for Admission: Septic arthritis of knee, left ([facility]  Reason for Consult: HTN and TRINA    Active Assessment/Plan (MDM):     Principal Problem:    Septic arthritis of knee, left ([facility] (2021)  - S/P I&D with washout with purulent drainage  - Gram stain with GPC  - Continue Vancomycin  - ID consulted    Active Problems:    HLD (hyperlipidemia) (2012)  - Continue Lipitor      HTN (hypertension) (2012)  - Continue Chlorthalidone      TRINA on CPAP (2012)  - Continue CPAP with resting  - Currently hypoxic after anesthesia  - Check CXR  - Denies SOB      Gout (2012)  - Continue Allopurinol      GERD (gastroesophageal reflux disease) (2013)  - Continue PPI      Obesity, Class III, BMI 40-49.9 (morbid obesity) ([facility] (2019)      Acute pain of left knee (7/15/2021)  - Per primary      Stage 3 chronic kidney disease ([facility] (7/15/2021)  - Creatinine stable      Left knee pain (2021)  - Per primary      Diet: ADULT DIET Regular  VTE ppx: Per primary team - ASA  GI ppx: PPE    PCP: Hiram Thakur MD    Attending MD: João Chong DO    Treatment Team: Attending Provider: Oleg Guerrero MD; Charge Nurse: Nino Mejia Consulting Provider: Allen Wilson MD; Consulting Provider: Julienne Smalls DO    All Medical Diagnoses:     Hospital Problems as of 2021 Date Reviewed: 7/15/2021          Codes Class Noted - Resolved POA    * (Principal) Septic arthritis of knee, left ([facility] ICD-10-CM: M00.9  ICD-9-CM: 711.06  2021 - Present Yes        Left knee pain ICD-10-CM: M25.562  ICD-9-CM: 719.46  2021 - Present Yes        Acute pain of left knee ICD-10-CM: M25.562  ICD-9-CM: 719.46  7/15/2021 - Present Yes        Stage 3 chronic kidney disease ([facility] ICD-10-CM: N18.30  ICD-9-CM: 585.3  7/15/2021 - Present Yes        Obesity, Class III, BMI 40-49.9 (morbid obesity) ([facility] ICD-10-CM: E66.01  ICD-9-CM: 278.01  8/27/2019 - Present Yes        GERD (gastroesophageal reflux disease) ICD-10-CM: K21.9  ICD-9-CM: 530.81  5/14/2013 - Present Yes        HLD (hyperlipidemia) ICD-10-CM: E78.5  ICD-9-CM: 272.4  11/14/2012 - Present Yes        HTN (hypertension) ICD-10-CM: I10  ICD-9-CM: 401.9  11/14/2012 - Present Yes        TRINA on CPAP ICD-10-CM: G47.33, Z99.89  ICD-9-CM: 327.23, V46.8  11/14/2012 - Present Yes        Gout ICD-10-CM: M10.9  ICD-9-CM: 274.9  11/14/2012 - Present Yes              HPI:     Ajay Gibbons is a 64year old CM with a PMH of CKD (Stage III), CAD, HTN, HLD, TRINA on CPAP, and left TKA on 10/29/21 who presented from an outside ER with acute right knee pain, swelling, and erythema after going to [place] and walking on it and getting in hot tub/pool. He presented to the ER with pain and swelling. Orthopedics admitted and took to the OR for I&D with washout of purulent drainage. A drain was placed. Currently he is on his CPAP post-surgery but alert and offers only complaint of left knee pain of 6/10. Denies CP/SOB/cough. Denies F/C. Denies N/V/D.     Complete ROS done and is as stated in HPI or otherwise negative    Past Medical History:   Diagnosis Date    CAD (coronary artery disease) 11/14/2012    cath done 9/15/2019 at [address] [place] [address] cath lab: NO sig coronary atherosclerosis disease and preserved LVSF with EF at 50%    Chronic kidney disease 07/12/2021    per notes in patient chart from ED visit on 7/12/2021; hospital two months ago - badly dehydrated denies kidney disease and does not see neph or uro    Dyspnea 11/14/2012    only under exertion     GERD (gastroesophageal reflux disease)     controlled with medication     Gout     allopurinol    Hiatal hernia     History of COVID-19 10/2020    respiratory symptoms but no hosp no vent     History of EKG 07/12/2021    E[facility] at [facility] SR poor R wave progression    HLD (hyperlipidemia) 11/14/2012    HTN (hypertension) 11/14/2012    Hx of cardiovascular stress test 07/13/2021    LVEF 65% normal per interpretation from [address]     atorvastatin    Obesity 11/14/2012    Osteoarthritis 11/14/2012    Psychiatric disorder     on medication for anxiety depression    Sleep apnea 11/14/2012    cpap         Past Surgical History:   Procedure Laterality Date    HX APPENDECTOMY      1983    HX CARPAL TUNNEL RELEASE      06/29/2010   Girma Verdin Grew 10/09/2007    HX COLONOSCOPY  07/14/2021    Dr. Jagdish Figueroa; benign polyp of the sigmoid colon, diverticulosis of large intestine without perforation    HX ENDOSCOPY  09/14/2021    Dr. Jagdish Figueroa; hiatal hernia, GERD without esophagitis    HX KNEE REPLACEMENT Right 03/08/2021    Dr. Marleni Eduardo HX LITHOTRIPSY      righ 03/03/2008 and left 06/30/2008    HX ORTHOPAEDIC      hand tendon repair    MS CARDIAC SURG PROCEDURE UNLIST      cath no intervention has seen cardio since cath and states heart is fine         Prior to Admission Medications   Prescriptions Last Dose Informant Patient Reported? Taking? FLUoxetine (PROzac) 20 mg capsule 11/27/2021 at Unknown time  Yes Yes   Sig: Take 60 mg by mouth daily. allopurinoL (ZYLOPRIM) 300 mg tablet 11/27/2021 at Unknown time  Yes Yes   Sig: Take 300 mg by mouth two (2) times a day. aspirin delayed-release 81 mg tablet 11/20/2021 at Unknown time  Yes Yes   Sig: Take 81 mg by mouth daily. atorvastatin (LIPITOR) 40 mg tablet 11/27/2021 at Unknown time  No Yes   Sig: TAKE 1 TABLET BY MOUTH EVERY DAY   Patient taking differently: every other day. chlorthalidone (HYGROTON) 25 mg tablet 11/20/2021 at Unknown time  Yes Yes   Sig: Take 25 mg by mouth every six (6) hours as needed for PRN Reason (Other) (hiccups). chlorthalidone (HYGROTON) 25 mg tablet 2021 at Unknown time  Yes Yes   Sig: Take 25 mg by mouth daily. diclofenac EC (VOLTAREN) 50 mg EC tablet 2021 at Unknown time  Yes Yes   Sig: Take 50 mg by mouth two (2) times a day. lisinopril-hydroCHLOROthiazide (PRINZIDE, ZESTORETIC) 20-12.5 mg per tablet 2021 at Unknown time  Yes Yes   Sig: Take 2 Tablets by mouth daily. oxyCODONE IR (ROXICODONE) 5 mg immediate release tablet 2021 at Unknown time  Yes Yes   Sig: Take 5 mg by mouth every four (4) hours as needed for Pain. Indications: pain   oxyCODONE IR (ROXICODONE) 5 mg immediate release tablet 2021 at Unknown time  Yes Yes   Sig: Take 5 mg by mouth every four (4) hours as needed for Pain.   pantoprazole (PROTONIX) 40 mg tablet 2021 at Unknown time  Yes Yes   Sig: Take 40 mg by mouth two (2) times a day. prazosin (MINIPRESS) 2 mg capsule 2021 at Unknown time  Yes Yes   Sig: Take 4 mg by mouth nightly. zolpidem (Ambien) 10 mg tablet 2021 at Unknown time  Yes Yes   Sig: Take 10 mg by mouth nightly as needed for Sleep. Facility-Administered Medications: None       Allergies   Allergen Reactions    Oxycodone Vertigo     Patient denies         Social History     Tobacco Use    Smoking status: Never Smoker    Smokeless tobacco: Never Used   Substance Use Topics    Alcohol use:  Yes     Alcohol/week: 2.0 standard drinks     Types: 2 Cans of beer per week     Comment: drinks once monthly        Family History   Problem Relation Age of Onset    Diabetes Mother     Cancer Father         lung and brain cancer    Diabetes Father     Diabetes Paternal Grandmother         Objective:       Visit Vitals  BP (!) 122/58   Pulse (!) 101   Temp 97.3 °F (36.3 °C)   Resp 18   Ht 5' 9\" (1.753 m)   Wt 130.2 kg (287 lb)   SpO2 91%   BMI 42.38 kg/m²        Temp (24hrs), Av.1 °F (36.7 °C), Min:97.2 °F (36.2 °C), Max:99 °F (37.2 °C)      Oxygen Therapy  O2 Sat (%): 91 % (11/28/21 1731)  Pulse via Oximetry: 103 beats per minute (11/28/21 1731)  O2 Device: CPAP mask (11/28/21 1731)  O2 Flow Rate (L/min): 5 l/min (11/28/21 1731)      Physical Exam:    General:    Alert, cooperative, no distress, appears stated age. Eyes:   PERRLA EOMI Anicteric  Head:   Normocephalic, without obvious abnormality, atraumatic. ENT:  Nares normal. No drainage. Lungs:   Clear to auscultation bilaterally. No Wheezing or Rhonchi. No rales. Heart:  Tachy, reg rhythm,  no murmur, rub or gallop. No JVD. Abdomen:   Soft, non-tender. Not distended. Bowel sounds normal.   MSK:  Left knee with edema, LIAM drain in place. No clubbing or cyanosis. No deformities/lesions. Skin:     Texture, turgor normal. No rashes or lesions. No Jaundice. Neurologic: Alert and oriented x 3, no focal deficits   Psychiatry:      No depression/anxiety. Mood congruent for context. Heme/Lymph/Immune: No echymoses or overt signs of bleeding. Lab/Data Review:  Recent Results (from the past 24 hour(s))   CELL COUNT, BODY FLUID    Collection Time: 11/28/21  9:00 AM   Result Value Ref Range    BODY FLUID TYPE JOINT FLUID      FLUID COLOR YELLOW      FLUID APPEARANCE TURBID      FLUID RBC CT. (NOTE) /cu mm    FLUID WBC COUNT (NOTE) /cu mm    [facility] NEUTROPHIL 92 %    [facility] LYMPHS 6 %    [facility] MACROPHAGES 2 %   CULTURE, BODY FLUID W GRAM STAIN    Collection Time: 11/28/21  9:00 AM    Specimen: Joint Fluid;  Body Fluid    HOLD SPECIMEN FOR 7 DAYS   Result Value Ref Range    Special Requests: PATIENT HAD A DOSE OF VANCOMYCIN ON 11/28/21     GRAM STAIN 40 TO 80  WBCS/OIF     GRAM STAIN MODERATE GRAM POSITIVE COCCI      Culture result: PENDING    COVID-19 RAPID TEST    Collection Time: 11/28/21  9:30 AM   Result Value Ref Range    Specimen source Nasopharyngeal      COVID-19 rapid test Not detected NOTD         Imaging:  DUPLEX LOWER EXT VENOUS LEFT    Result Date: 11/28/2021  No evidence of deep venous thrombosis in the left lower extremity. Cultures: All Micro Results     Procedure Component Value Units Date/Time    CULTURE, BODY FLUID Jim Coombe STAIN [[id number]] Collected: 11/28/21 0900    Order Status: Completed Specimen: Body Fluid from Joint Fluid Updated: 11/28/21 1328     Special Requests: PATIENT HAD A DOSE OF VANCOMYCIN ON 11/28/21     GRAM STAIN 40 TO 80  WBCS/OIF            MODERATE GRAM POSITIVE COCCI           Culture result: PENDING    COVID-19 RAPID TEST [[id number]] Collected: 11/28/21 0930    Order Status: Completed Specimen: Nasopharyngeal Updated: 11/28/21 1047     Specimen source Nasopharyngeal        COVID-19 rapid test Not detected        Comment:      The specimen is NEGATIVE for SARS-CoV-2, the novel coronavirus associated with COVID-19. A negative result does not rule out COVID-19. This test has been authorized by the FDA under an Emergency Use Authorization (EUA) for use by authorized laboratories. Fact sheet for Healthcare Providers: [employer]  Fact sheet for Patients: [employer]       Methodology: Isothermal Nucleic Acid Amplification         CULTURE, BODY FLUID Jim Heartbe STAIN [[id number]]     Order Status: Canceled Specimen:  Body Fluid from Joint Fluid           Active Problems:     Principal Diagnosis Septic arthritis of knee, left [facility]    Hospital Problems as of 11/28/2021 Date Reviewed: 7/15/2021          Codes Class Noted - Resolved POA    * (Principal) Septic arthritis of knee, left ([facility] ICD-10-CM: M00.9  ICD-9-CM: 711.06  11/28/2021 - Present Yes        Left knee pain ICD-10-CM: M25.562  ICD-9-CM: 719.46  11/27/2021 - Present Yes        Acute pain of left knee ICD-10-CM: M25.562  ICD-9-CM: 719.46  7/15/2021 - Present Yes        Stage 3 chronic kidney disease ([facility] ICD-10-CM: N18.30  ICD-9-CM: 585.3  7/15/2021 - Present Yes        Obesity, Class III, BMI 40-49.9 (morbid obesity) ([facility] ICD-10-CM: E66.01  ICD-9-CM: 278.01  8/27/2019 - Present Yes        GERD (gastroesophageal reflux disease) ICD-10-CM: K21.9  ICD-9-CM: 530.81  5/14/2013 - Present Yes        HLD (hyperlipidemia) ICD-10-CM: E78.5  ICD-9-CM: 272.4  11/14/2012 - Present Yes        HTN (hypertension) ICD-10-CM: I10  ICD-9-CM: 401.9  11/14/2012 - Present Yes        TRINA on CPAP ICD-10-CM: G47.33, Z99.89  ICD-9-CM: 327.23, V46.8  11/14/2012 - Present Yes        Gout ICD-10-CM: M10.9  ICD-9-CM: 274.9  11/14/2012 - Present Yes              Thank you for allowing us to participate in the care of this patient. We will gladly follow along with you.     DO Marcus Thibodeaux Hospitalist Service  11/28/2021 6:06 PM

## 2021-11-28 NOTE — PROGRESS NOTES
[address] [facility] Pharmacokinetic Monitoring Service - Vancomycin    Cain Camarena. is a 64 y.o. male starting on vancomycin therapy for septic knee. Pharmacy consulted by Dr. Lamin Hancock for monitoring and adjustment. Target Concentration: Single  Additional Antimicrobials: Zosyn    Pertinent Laboratory Values: Wt Readings from Last 1 Encounters:   11/28/21 130.2 kg (287 lb)     Temp Readings from Last 1 Encounters:   11/28/21 97.3 °F (36.3 °C)     No components found for: PROCAL  No results for input(s): WBC in the last 72 hours. No lab exists for component: CREATININE,  BUN    Pertinent Cultures:  Culture Date Source Results   - - -   MRSA Nasal Swab: N/A. Non-respiratory infection. .    Plan:    Start vancomycin 2000 mg x 1 followed by 1500 mg q24h  Vancomycin concentration ordered for 11/28 @ 0400   Serum creatinine ordered with morning labs as well.   Pharmacy will continue to monitor patient and adjust therapy as indicated    Thank you for the consult,  Belén Kang  11/28/2021 5:25 PM

## 2021-11-28 NOTE — PERIOP NOTES
TRANSFER - OUT REPORT:    Verbal report given to Kristen Mckinney on Nicole Meyers.  being transferred to [id number] for routine post - op       Report consisted of patients Situation, Background, Assessment and   Recommendations(SBAR). Information from the following report(s) SBAR, OR Summary, Procedure Summary, Intake/Output and MAR was reviewed with the receiving nurse. Lines:   Peripheral IV Left Antecubital (Active)   Site Assessment Clean, dry, & intact 11/28/21 1546   Phlebitis Assessment 0 11/28/21 1546   Infiltration Assessment 0 11/28/21 1546   Dressing Status Clean, dry, & intact; Occlusive 11/28/21 1546   Dressing Type Transparent; Tape 11/28/21 1546   Hub Color/Line Status Pink; Patent 11/28/21 1546   Alcohol Cap Used No 11/28/21 1546       Peripheral IV 11/28/21 Posterior; Right Hand (Active)   Site Assessment Clean, dry, & intact 11/28/21 1546   Phlebitis Assessment 0 11/28/21 1546   Infiltration Assessment 0 11/28/21 1546   Dressing Status Clean, dry, & intact; Occlusive 11/28/21 1546   Dressing Type Transparent; Tape 11/28/21 1546   Hub Color/Line Status Pink; Patent 11/28/21 1546   Alcohol Cap Used No 11/28/21 1546        Opportunity for questions and clarification was provided. Patient transported with:   O2 @ 4 liters    VTE prophylaxis orders have been written for Nicole Meyers. .    Patient and family given floor number and nurses name. Family updated re: pt status after security code verified.

## 2021-11-28 NOTE — PROGRESS NOTES
TRANSFER - IN REPORT:    Verbal report received from Eyad, (name) on Jonn Chakraborty.  being received from [facility]) for routine post - op      Report consisted of patients Situation, Background, Assessment and   Recommendations(SBAR). Information from the following report(s) SBAR, Kardex, Procedure Summary, Intake/Output, MAR and Recent Results was reviewed with the receiving nurse. Opportunity for questions and clarification was provided. Assessment completed upon patients arrival to unit and care assumed.

## 2021-11-28 NOTE — BRIEF OP NOTE
Brief Postoperative Note    Patient: Cj Solano. YOB: 1960  MRN: [id number]    Date of Procedure: 11/28/2021     Pre-Op Diagnosis: septic left knee    Post-Op Diagnosis: Same as preoperative diagnosis. Procedure(s):  EXTREMITY IRRIGATION AND DEBRIDEMENT LEFT KNEE and poly swap of tibial component    Surgeon(s):  Ileana Eisenmenger, MD    Surgical Assistant: None    Anesthesia: General     Estimated Blood Loss (mL): less than 200     Complications: None noted    Specimens: * No specimens in log *     Implants: * No implants in log *    Drains:   Wisam-Mendoza Drain 11/28/21 Left; Outer Knee (Active)   Site Assessment Clean, dry, & intact 11/28/21 1546   Dressing Status Clean, dry, & intact 11/28/21 1546   Status Patent; Charged; Draining 11/28/21 1546   Drainage Color Sanguinous 11/28/21 1546       Wisam-Mendoza Drain 11/28/21 Left; Inner Knee (Active)   Site Assessment Clean, dry, & intact 11/28/21 1546   Dressing Status Clean, dry, & intact 11/28/21 1546   Status Patent; Charged; Draining 11/28/21 1546   Drainage Color Sanguinous 11/28/21 1546       Findings: abundant pus that appeared in the prepatellar area as well as the joint.     Electronically Signed by Theodore Garza MD on 11/28/2021 at 4:05 PM

## 2021-11-28 NOTE — PROGRESS NOTES
Am assessment completed. (see flow sheets for details). Left knee swollen 2+ red, hot, draining. Bennie Enriquez

## 2021-11-28 NOTE — PROGRESS NOTES
TRANSFER - IN REPORT:    Verbal report received from [address] RN(name) on Duckworth Carlota.  being received from [facility](unit) for routine progression of care      Report consisted of patients Situation, Background, Assessment and   Recommendations(SBAR). Information from the following report(s) SBAR and Kardex was reviewed with the receiving nurse. Opportunity for questions and clarification was provided. Assessment completed upon patients arrival to unit and care assumed.

## 2021-11-28 NOTE — ANESTHESIA POSTPROCEDURE EVALUATION
Procedure(s):  EXTREMITY IRRIGATION AND DEBRIDEMENT LEFT KNEE. general    Anesthesia Post Evaluation      Multimodal analgesia: multimodal analgesia used between 6 hours prior to anesthesia start to PACU discharge  Patient location during evaluation: PACU  Patient participation: complete - patient participated  Level of consciousness: sleepy but conscious, responsive to verbal stimuli and responsive to physical stimuli  Pain management: adequate  Airway patency: patent  Anesthetic complications: no  Cardiovascular status: acceptable  Respiratory status: acceptable  Hydration status: acceptable  Comments: Patient should wear his CPAP religiously for 4 days  Post anesthesia nausea and vomiting:  none      INITIAL Post-op Vital signs:   Vitals Value Taken Time   /58 11/28/21 1647   Temp 36.3 °C (97.3 °F) 11/28/21 1626   Pulse 101 11/28/21 1649   Resp 18 11/28/21 1647   SpO2 98 % 11/28/21 1649   Vitals shown include unvalidated device data.

## 2021-11-28 NOTE — PROGRESS NOTES
11/27/21 2320   Dual Skin Pressure Injury Assessment   Dual Skin Pressure Injury Assessment WDL   Second Care Provider (Based on [address] [place] [address]) Cuauhtemoc Cam RN   Skin Integumentary   Skin Integumentary (WDL) X   Skin Color Red; Appropriate for ethnicity   Skin Condition/Temp Hot; Inflamed   Skin Integrity Wound (add Wound LDA)   Turgor Non-tenting   Hair Growth Present     Left knee red, hot w/ purulent drainage. +2 pitting edema. Area of redness demarcated. No pressure injuries.

## 2021-11-28 NOTE — PERIOP NOTES
TRANSFER - IN REPORT:    Verbal report received from [facility] on Annye Minsilvina. being received from [phone] for ordered procedure      Report consisted of patients Situation, Background, Assessment and Recommendations(SBAR). Information from the following report(s) SBAR, Kardex, MAR, Recent Results, Procedure Verification and Quality Measures was reviewed with the receiving nurse. Opportunity for questions and clarification was provided. Assessment completed upon patients arrival to unit and care assumed.

## 2021-11-28 NOTE — ANESTHESIA PROCEDURE NOTES
Left adductor canal block    Start time: 11/28/2021 1:00 PM  End time: 11/28/2021 1:05 PM  Performed by: Anirudh Brandt MD  Authorized by: Anirudh Brandt MD       Pre-procedure: Indications: at surgeon's request and post-op pain management    Preanesthetic Checklist: patient identified, risks and benefits discussed, site marked, timeout performed, anesthesia consent given and patient being monitored    Timeout Time: 13:00 EST          Block Type:   Block Type:   Adductor canal  Laterality:  Left  Monitoring:  Continuous pulse ox, frequent vital sign checks, heart rate, oxygen and responsive to questions  Injection Technique:  Single shot  Procedures: ultrasound guided    Patient Position: supine  Prep: chlorhexidine    Location:  Mid thigh  Needle Gauge:  20 G  Needle Localization:  Ultrasound guidance  Medication Injected:  Ropivacaine (PF) (NAROPIN)(0.5%) 5 mg/mL injection, 10 mL  dexamethasone (DECADRON) 4 mg/mL injection, 4 mg (diluted with 10ml NS)  Med Admin Time: 11/28/2021 1:05 PM    Assessment:  Number of attempts:  1  Injection Assessment:  Incremental injection every 5 mL, local visualized surrounding nerve on ultrasound, negative aspiration for blood, no intravascular symptoms, no paresthesia and ultrasound image on chart  Patient tolerance:  Patient tolerated the procedure well with no immediate complications

## 2021-11-28 NOTE — ANESTHESIA PREPROCEDURE EVALUATION
Relevant Problems   RESPIRATORY SYSTEM   (+) Dyspnea   (+) TRINA on CPAP      CARDIOVASCULAR   (+) CAD (coronary artery disease)   (+) HTN (hypertension)      GASTROINTESTINAL   (+) GERD (gastroesophageal reflux disease)      RENAL FAILURE   (+) Renal insufficiency      ENDOCRINE   (+) Chronic idiopathic gout involving toe of left foot without tophus   (+) Obesity       Anesthetic History   No history of anesthetic complications            Review of Systems / Medical History  Pertinent labs reviewed    Pulmonary        Sleep apnea: CPAP           Neuro/Psych         Psychiatric history     Cardiovascular    Hypertension              Exercise tolerance: <4 METS: Limited by knee pain. Comments: Normal cath 2019. Normal stress test 7/21.    GI/Hepatic/Renal     GERD: well controlled    Renal disease: CRI  Hiatal hernia     Endo/Other        Morbid obesity and arthritis     Other Findings              Physical Exam    Airway  Mallampati: II  TM Distance: 4 - 6 cm  Neck ROM: normal range of motion   Mouth opening: Normal    Comments: Large tongue Cardiovascular  Regular rate and rhythm,  S1 and S2 normal,  no murmur, click, rub, or gallop  Rhythm: regular  Rate: normal        Comments: distant Dental  No notable dental hx       Pulmonary  Breath sounds clear to auscultation              Comments: distant Abdominal  GI exam deferred       Other Findings            Anesthetic Plan    ASA: 3, emergent  Anesthesia type: general      Post-op pain plan if not by surgeon: peripheral nerve block single    Induction: Intravenous  Anesthetic plan and risks discussed with: Patient and Spouse

## 2021-11-28 NOTE — PROGRESS NOTES
TRANSFER - OUT REPORT:    Verbal report given to Quigley Rn(name) on Xeko.  being transferred to pre op (unit) for ordered procedure       Report consisted of patients Situation, Background, Assessment and   Recommendations(SBAR). Information from the following report(s) SBAR, Kardex, Intake/Output and MAR was reviewed with the receiving nurse. Lines:   Peripheral IV Left Antecubital (Active)   Site Assessment Clean, dry, & intact 11/27/21 2320   Phlebitis Assessment 0 11/27/21 2320   Infiltration Assessment 0 11/27/21 2320   Dressing Status Clean, dry, & intact 11/27/21 2320   Dressing Type Transparent 11/27/21 2320   Hub Color/Line Status Flushed; Infusing 11/27/21 2320        Opportunity for questions and clarification was provided.       Patient transported with:   [employer]

## 2021-11-28 NOTE — H&P
GENERIC PRE-OP HISTORY AND PHYSICAL    Subjective:     Patient is a 64 y.o.  male presented with a history of left knee swelling, drainage and increased pain after a TKA at this facility on 10/29/21. Onset of symptoms was over the past several days after he admits to going to [employer] walking a lot and going in a hot tub and swimming pool. He is being admitted for surgical management of this condition. The indications for the procedure include septic knee. Pt presented to [facility] after patient  wife called telling our NP that she wanted him seen for a possible clot-our NP advised them to come to [facility]. We were contacted by the mid level Sosa VALENCIA) there as well as Dr Sriram Phillips who requested that we transfer patient here. He felt the patient needed a \"washout\" , but he did not evaluate the patient himself.     Patient Active Problem List    Diagnosis Date Noted    Left knee pain 11/27/2021    Localized osteoarthritis of left knee 10/29/2021    Chronic idiopathic gout involving toe of left foot without tophus 07/15/2021    Acute pain of left knee 07/15/2021    Renal insufficiency 07/15/2021    Obesity, Class III, BMI 40-49.9 (morbid obesity) ([facility] [address] 08/27/2019    GERD (gastroesophageal reflux disease) 05/14/2013    HLD (hyperlipidemia) 11/14/2012    Obesity 11/14/2012    HTN (hypertension) 11/14/2012    TRINA on CPAP 11/14/2012    Gout 11/14/2012    Osteoarthritis 11/14/2012    Dyspnea 11/14/2012    CAD (coronary artery disease) 11/14/2012     Past Medical History:   Diagnosis Date    CAD (coronary artery disease) 11/14/2012    cath done 9/15/2019 at [facility] cath lab: NO sig coronary atherosclerosis disease and preserved LVSF with EF at 50%    Chronic kidney disease 07/12/2021    per notes in patient chart from ED visit on 7/12/2021; hospital two months ago - badly dehydrated denies kidney disease and does not see neph or uro    Dyspnea 11/14/2012    only under exertion     GERD (gastroesophageal reflux disease)     controlled with medication     Gout     allopurinol    Hiatal hernia     History of COVID-19 10/2020    respiratory symptoms but no hosp no vent     History of EKG 07/12/2021    EKG done at [facility] SR poor R wave progression    HLD (hyperlipidemia) 11/14/2012    HTN (hypertension) 11/14/2012    Hx of cardiovascular stress test 07/13/2021    LVEF 65% normal per interpretation from [address]     atorvastatin    Obesity 11/14/2012    Osteoarthritis 11/14/2012    Psychiatric disorder     on medication for anxiety depression    Sleep apnea 11/14/2012    cpap       Past Surgical History:   Procedure Laterality Date    HX APPENDECTOMY      1983    HX CARPAL TUNNEL RELEASE      06/29/2010   Jayant Romo Gearlisa 10/09/2007    HX COLONOSCOPY  07/14/2021    Dr. Saima Tomlinson; benign polyp of the sigmoid colon, diverticulosis of large intestine without perforation    HX ENDOSCOPY  09/14/2021    Dr. Saima Tomlinson; hiatal hernia, GERD without esophagitis    HX KNEE REPLACEMENT Right 03/08/2021    Dr. Louvenia Alpers HX LITHOTRIPSY      righ 03/03/2008 and left 06/30/2008    HX ORTHOPAEDIC      hand tendon repair    MD CARDIAC SURG PROCEDURE UNLIST      cath no intervention has seen cardio since cath and states heart is fine       Prior to Admission medications    Medication Sig Start Date End Date Taking? Authorizing Provider   atorvastatin (LIPITOR) 40 mg tablet TAKE 1 TABLET BY MOUTH EVERY DAY  Patient taking differently: every other day. 11/10/21  Yes Luly Trammell MD   chlorthalidone (HYGROTON) 25 mg tablet Take 25 mg by mouth every six (6) hours as needed for PRN Reason (Other) (hiccups). Yes Provider, Historical   oxyCODONE IR (ROXICODONE) 5 mg immediate release tablet Take 5 mg by mouth every four (4) hours as needed for Pain.  Indications: pain   Yes Provider, Historical   oxyCODONE IR (ROXICODONE) 5 mg immediate release tablet Take 5 mg by mouth every four (4) hours as needed for Pain. Yes Destinee Meade MD   chlorthalidone (HYGROTON) 25 mg tablet Take 25 mg by mouth daily. Yes Destinee Meade MD   aspirin delayed-release 81 mg tablet Take 81 mg by mouth daily. Yes Provider, Historical   FLUoxetine (PROzac) 20 mg capsule Take 60 mg by mouth daily. Yes Provider, Historical   prazosin (MINIPRESS) 2 mg capsule Take 4 mg by mouth nightly. Yes Provider, Historical   diclofenac EC (VOLTAREN) 50 mg EC tablet Take 50 mg by mouth two (2) times a day. Yes Provider, Historical   pantoprazole (PROTONIX) 40 mg tablet Take 40 mg by mouth two (2) times a day. Yes Provider, Historical   allopurinoL (ZYLOPRIM) 300 mg tablet Take 300 mg by mouth two (2) times a day. Yes Provider, Historical   lisinopril-hydroCHLOROthiazide (PRINZIDE, ZESTORETIC) 20-12.5 mg per tablet Take 2 Tablets by mouth daily. Yes Provider, Historical   zolpidem (Ambien) 10 mg tablet Take 10 mg by mouth nightly as needed for Sleep. Yes Provider, Historical     Allergies   Allergen Reactions    Oxycodone Vertigo     Patient denies       Social History     Tobacco Use    Smoking status: Never Smoker    Smokeless tobacco: Never Used   Substance Use Topics    Alcohol use: Yes     Alcohol/week: 2.0 standard drinks     Types: 2 Cans of beer per week     Comment: drinks once monthly      Family History   Problem Relation Age of Onset    Diabetes Mother     Cancer Father         lung and brain cancer    Diabetes Father     Diabetes Paternal Grandmother       Review of Systems  Pertinent items are noted in HPI. Lungs clear/heart RRR    Objective:     Patient Vitals for the past 8 hrs:   BP Temp Pulse Resp SpO2   11/28/21 0730 133/88 99 °F (37.2 °C) 75 20 93 %   11/28/21 0434 109/70 98.8 °F (37.1 °C) (!) 108 22 91 %     Left knee was examined; Painful -erythema noted to the knee with thin yellow drainage noted from incision. Numerous suture granulomas noted. The left knee was aspirated under aspetic conditions for obvious purulent drainage-this was sent to lab here for cultures/sensitivities/gram stain and cell count. Painful knee to move, unable to bear weight. Imaging Review  Ultrasound superificial peroneal at [place]-repeat today here    Assessment:     Active Problems:    Acute pain of left knee (7/15/2021)      Left knee pain (11/27/2021)        Plan:     The various methods of treatment have been discussed with the patient and family. After consideration of risks, benefits and other options for treatment, the patient has consented to surgical interventions (including nerve and tendon damage and death). Questions were answered and Pre-op teaching was done by myself.

## 2021-11-29 LAB
ALBUMIN SERPL-MCNC: 2.1 G/DL (ref 3.2–4.6)
ALBUMIN/GLOB SERPL: 0.5 {RATIO} (ref 1.2–3.5)
ALP SERPL-CCNC: 81 U/L (ref 50–136)
ALT SERPL-CCNC: 24 U/L (ref 12–65)
ANION GAP SERPL CALC-SCNC: 6 MMOL/L (ref 7–16)
AST SERPL-CCNC: 26 U/L (ref 15–37)
BASOPHILS # BLD: 0 K/UL (ref 0–0.2)
BASOPHILS NFR BLD: 0 % (ref 0–2)
BILIRUB DIRECT SERPL-MCNC: 0.2 MG/DL
BILIRUB SERPL-MCNC: 0.5 MG/DL (ref 0.2–1.1)
BUN SERPL-MCNC: 29 MG/DL (ref 8–23)
CALCIUM SERPL-MCNC: 8.8 MG/DL (ref 8.3–10.4)
CHLORIDE SERPL-SCNC: 104 MMOL/L (ref 98–107)
CO2 SERPL-SCNC: 27 MMOL/L (ref 21–32)
CREAT SERPL-MCNC: 1.11 MG/DL (ref 0.8–1.5)
CREAT SERPL-MCNC: 1.38 MG/DL (ref 0.8–1.5)
CRP SERPL-MCNC: 18.9 MG/DL (ref 0–0.9)
DIFFERENTIAL METHOD BLD: ABNORMAL
EOSINOPHIL # BLD: 0 K/UL (ref 0–0.8)
EOSINOPHIL NFR BLD: 0 % (ref 0.5–7.8)
ERYTHROCYTE [DISTWIDTH] IN BLOOD BY AUTOMATED COUNT: 13.9 % (ref 11.9–14.6)
GLOBULIN SER CALC-MCNC: 4.5 G/DL (ref 2.3–3.5)
GLUCOSE SERPL-MCNC: 100 MG/DL (ref 65–100)
HCT VFR BLD AUTO: 32.6 % (ref 41.1–50.3)
HGB BLD-MCNC: 10.3 G/DL (ref 13.6–17.2)
HGB BLD-MCNC: 10.3 G/DL (ref 13.6–17.2)
IMM GRANULOCYTES # BLD AUTO: 0 K/UL (ref 0–0.5)
IMM GRANULOCYTES NFR BLD AUTO: 0 % (ref 0–5)
LYMPHOCYTES # BLD: 0.9 K/UL (ref 0.5–4.6)
LYMPHOCYTES NFR BLD: 9 % (ref 13–44)
MCH RBC QN AUTO: 30.7 PG (ref 26.1–32.9)
MCHC RBC AUTO-ENTMCNC: 31.6 G/DL (ref 31.4–35)
MCV RBC AUTO: 97 FL (ref 79.6–97.8)
MONOCYTES # BLD: 0.6 K/UL (ref 0.1–1.3)
MONOCYTES NFR BLD: 6 % (ref 4–12)
NEUTS SEG # BLD: 8.3 K/UL (ref 1.7–8.2)
NEUTS SEG NFR BLD: 85 % (ref 43–78)
NRBC # BLD: 0 K/UL (ref 0–0.2)
PLATELET # BLD AUTO: 222 K/UL (ref 150–450)
PMV BLD AUTO: 10.7 FL (ref 9.4–12.3)
POTASSIUM SERPL-SCNC: 4.2 MMOL/L (ref 3.5–5.1)
PROT SERPL-MCNC: 6.6 G/DL (ref 6.3–8.2)
RBC # BLD AUTO: 3.36 M/UL (ref 4.23–5.6)
SODIUM SERPL-SCNC: 137 MMOL/L (ref 138–145)
VANCOMYCIN SERPL-MCNC: 12.4 UG/ML
WBC # BLD AUTO: 9.9 K/UL (ref 4.3–11.1)

## 2021-11-29 PROCEDURE — 74011000258 HC RX REV CODE- 258: Performed by: ORTHOPAEDIC SURGERY

## 2021-11-29 PROCEDURE — 80048 BASIC METABOLIC PNL TOTAL CA: CPT

## 2021-11-29 PROCEDURE — 74011250636 HC RX REV CODE- 250/636: Performed by: ORTHOPAEDIC SURGERY

## 2021-11-29 PROCEDURE — 97530 THERAPEUTIC ACTIVITIES: CPT

## 2021-11-29 PROCEDURE — 80076 HEPATIC FUNCTION PANEL: CPT

## 2021-11-29 PROCEDURE — 36415 COLL VENOUS BLD VENIPUNCTURE: CPT

## 2021-11-29 PROCEDURE — 97165 OT EVAL LOW COMPLEX 30 MIN: CPT

## 2021-11-29 PROCEDURE — 97535 SELF CARE MNGMENT TRAINING: CPT

## 2021-11-29 PROCEDURE — 86140 C-REACTIVE PROTEIN: CPT

## 2021-11-29 PROCEDURE — 74011250637 HC RX REV CODE- 250/637: Performed by: ORTHOPAEDIC SURGERY

## 2021-11-29 PROCEDURE — 74011250637 HC RX REV CODE- 250/637: Performed by: NURSE PRACTITIONER

## 2021-11-29 PROCEDURE — 85025 COMPLETE CBC W/AUTO DIFF WBC: CPT

## 2021-11-29 PROCEDURE — 2709999900 HC NON-CHARGEABLE SUPPLY

## 2021-11-29 PROCEDURE — 97162 PT EVAL MOD COMPLEX 30 MIN: CPT

## 2021-11-29 PROCEDURE — 85018 HEMOGLOBIN: CPT

## 2021-11-29 PROCEDURE — 80202 ASSAY OF VANCOMYCIN: CPT

## 2021-11-29 PROCEDURE — 65270000029 HC RM PRIVATE

## 2021-11-29 DEVICE — LEGION HIGHLY CROSS LINKED                                    POLYETHYLENE DISHED INSERT SIZE 5-6 11MM
Type: IMPLANTABLE DEVICE | Site: KNEE | Status: FUNCTIONAL
Brand: LEGION

## 2021-11-29 RX ORDER — RIFAMPIN 300 MG/1
300 CAPSULE ORAL EVERY 12 HOURS
Status: DISCONTINUED | OUTPATIENT
Start: 2021-11-29 | End: 2021-12-02 | Stop reason: HOSPADM

## 2021-11-29 RX ADMIN — Medication 1 AMPULE: at 08:57

## 2021-11-29 RX ADMIN — FLUOXETINE 60 MG: 20 CAPSULE ORAL at 08:57

## 2021-11-29 RX ADMIN — PIPERACILLIN SODIUM AND TAZOBACTAM SODIUM 4.5 G: 4; .5 INJECTION, POWDER, LYOPHILIZED, FOR SOLUTION INTRAVENOUS at 01:46

## 2021-11-29 RX ADMIN — Medication 10 ML: at 19:24

## 2021-11-29 RX ADMIN — CELECOXIB 200 MG: 100 CAPSULE ORAL at 17:50

## 2021-11-29 RX ADMIN — ACETAMINOPHEN 1000 MG: 500 TABLET ORAL at 17:50

## 2021-11-29 RX ADMIN — ATORVASTATIN CALCIUM 40 MG: 40 TABLET, FILM COATED ORAL at 21:41

## 2021-11-29 RX ADMIN — PANTOPRAZOLE SODIUM 40 MG: 40 TABLET, DELAYED RELEASE ORAL at 08:57

## 2021-11-29 RX ADMIN — OXYCODONE 10 MG: 5 TABLET ORAL at 11:44

## 2021-11-29 RX ADMIN — ZOLPIDEM TARTRATE 10 MG: 5 TABLET ORAL at 21:57

## 2021-11-29 RX ADMIN — CELECOXIB 200 MG: 100 CAPSULE ORAL at 05:43

## 2021-11-29 RX ADMIN — VANCOMYCIN HYDROCHLORIDE 1500 MG: 10 INJECTION, POWDER, LYOPHILIZED, FOR SOLUTION INTRAVENOUS at 17:51

## 2021-11-29 RX ADMIN — ACETAMINOPHEN 1000 MG: 500 TABLET ORAL at 05:42

## 2021-11-29 RX ADMIN — PIPERACILLIN SODIUM AND TAZOBACTAM SODIUM 4.5 G: 4; .5 INJECTION, POWDER, LYOPHILIZED, FOR SOLUTION INTRAVENOUS at 09:50

## 2021-11-29 RX ADMIN — HYDROMORPHONE HYDROCHLORIDE 2 MG: 2 TABLET ORAL at 02:51

## 2021-11-29 RX ADMIN — OXYCODONE 10 MG: 5 TABLET ORAL at 21:41

## 2021-11-29 RX ADMIN — ASPIRIN 81 MG: 81 TABLET ORAL at 21:41

## 2021-11-29 RX ADMIN — ASPIRIN 81 MG: 81 TABLET ORAL at 08:57

## 2021-11-29 RX ADMIN — MORPHINE SULFATE 2 MG: 2 INJECTION, SOLUTION INTRAMUSCULAR; INTRAVENOUS at 19:17

## 2021-11-29 RX ADMIN — ALLOPURINOL 300 MG: 300 TABLET ORAL at 08:57

## 2021-11-29 RX ADMIN — ALLOPURINOL 300 MG: 300 TABLET ORAL at 17:50

## 2021-11-29 RX ADMIN — Medication 1 AMPULE: at 21:41

## 2021-11-29 RX ADMIN — HYDROMORPHONE HYDROCHLORIDE 2 MG: 2 TABLET ORAL at 16:11

## 2021-11-29 RX ADMIN — ACETAMINOPHEN 1000 MG: 500 TABLET ORAL at 11:44

## 2021-11-29 RX ADMIN — SENNOSIDES AND DOCUSATE SODIUM 2 TABLET: 8.6; 5 TABLET ORAL at 08:57

## 2021-11-29 RX ADMIN — RIFAMPIN 300 MG: 300 CAPSULE ORAL at 14:09

## 2021-11-29 RX ADMIN — PANTOPRAZOLE SODIUM 40 MG: 40 TABLET, DELAYED RELEASE ORAL at 17:50

## 2021-11-29 RX ADMIN — ACETAMINOPHEN 1000 MG: 500 TABLET ORAL at 00:17

## 2021-11-29 RX ADMIN — PRAZOSIN HYDROCHLORIDE 4 MG: 1 CAPSULE ORAL at 21:41

## 2021-11-29 RX ADMIN — Medication 10 ML: at 05:42

## 2021-11-29 NOTE — PROGRESS NOTES
ACUTE OT GOALS:  (Developed with and agreed upon by patient and/or caregiver.)  1. Patient will complete total body bathing and dressing with SBA and adaptive equipment as needed. 2. Patient will complete toileting with SBA. 3. Patient will complete grooming ADL standing at sink with SUPERVISION. 4. Patient will tolerate 25 minutes of OT treatment with 1-2 rest breaks to increase activity tolerance for ADLs. 5. Patient will complete functional transfers with SUPERVISION and adaptive equipment as needed. 6. Patient will tolerate 10 minutes BUE exercises to increase strength for safe, functional transfers.      Timeframe: 7 visits   WBAT LLE  OCCUPATIONAL THERAPY ASSESSMENT: Initial Assessment and Daily Note OT Treatment Day # 1    Izabella Griffin is a 64 y.o. male   PRIMARY DIAGNOSIS: Septic arthritis of knee, left (HCC)  Left knee pain [M25.562]  Procedure(s) (LRB):  EXTREMITY IRRIGATION AND DEBRIDEMENT LEFT KNEE (Left)  1 Day Post-Op  Reason for Referral:    ICD-10: Treatment Diagnosis: Generalized Muscle Weakness (M62.81)  Difficulty in walking, Not elsewhere classified (R26.2)  INPATIENT: Payor: [facility] MEDICARE COMPLETE / Plan: [facility] MEDICARE COMPLETE / Product Type: Managed Care Medicare /   ASSESSMENT:     REHAB RECOMMENDATIONS:   Recommendation to date pending progress:  [address] [place] [address]  Equipment:    None pt has RW and SC     PRIOR LEVEL OF FUNCTION:  (Prior to Hospitalization)  INITIAL/CURRENT LEVEL OF FUNCTION:  (Based on today's evaluation)   Bathing:   Modified Independent  Dressing:   Independent  Feeding/Grooming:   Independent  Toileting:   Independent  Functional Mobility:   Independent Bathing:   Minimal Assistance  Dressing:   Moderate Assistance  Feeding/Grooming:   Set Up  Toileting:   Minimal Assistance x 2  Functional Mobility:   Minimal Assistance x 2     ASSESSMENT:  Mr. Sara Sher is a 63 y/o male presents s/p I & D of LLE after having L TKA (10/29/21) and getting an infection from a hot tub post op. Pt is WBAT LLE. Today pt presents with decreased activity tolerance, mobility, balance and increased pain impacting ADLs. Pt mod A x2 sit<>stand RW and min A x2 RW taking steps toward bedside chair. Pt required increased time and max verbal cues for pursed lip breathing throughout transfer. Pt is currently limited by pain, but anticipate increased activity tolerance and mobility once pain is controlled. Pt completed grooming ADLs with set up in chair, pt in too much pain to attempt walking to sink. Pt is currently functioning below baseline and would benefit from skilled OT services to address OT goals and plan of care. Rec HHOT at d/c.      SUBJECTIVE:   Mr. Sourav Mccoy states, \"I did have a great time with my grandkids though. \"    SOCIAL HISTORY/LIVING ENVIRONMENT: lives with wife, one level home, 2 RYAN, walk in shower w/ SC, has RW from L TKA, has been using RW at home since TKA otherwise independent, drives  Home Environment: Trailer/mobile home  One/Two Story Residence: One story  Living Alone: No  Support Systems: Spouse/Significant Other    OBJECTIVE:     PAIN: VITAL SIGNS: LINES/DRAINS:   Pre Treatment: Pain Screen  Pain Scale 1: Numeric (0 - 10)  Pain Intensity 1: 7  Pain Onset 1: post op  Pain Location 1: Knee  Pain Orientation 1: Left  Pain Description 1: Sore  Pain Intervention(s) 1: Ambulation/Increased Activity; Repositioned  Post Treatment: no c/o pain in bedside chair   LIAM Drain x2  O2 Device: None (Room air)     GROSS EVALUATION:  BUE Within Functional Limits Abnormal/ Functional Abnormal/ Non-Functional (see comments) Not Tested Comments:   AROM [x] [] [] []    PROM [] [] [] [x]    Strength [x] [] [] []    Balance [] [x] [] [] Good EOB, fair static and dynamic standing w/ RW   Posture [x] [] [] []    Sensation [x] [] [] []    Coordination [x] [] [] []    Tone [] [] [] [x]    Edema [] [] [] [x]    Activity Tolerance [] [x] [] []     [] [] [] [] COGNITION/  PERCEPTION: Intact Impaired   (see comments) Comments:   Orientation [x] []    Vision [x] []    Hearing [x] []    Judgment/ Insight [x] []    Attention [x] []    Memory [x] []    Command Following [x] []    Emotional Regulation [x] []     [] []      ACTIVITIES OF DAILY LIVING: I Mod I S SBA CGA Min Mod Max Total NT Comments   BASIC ADLs:              Bathing/ Showering [] [] [] [] [] [] [] [] [] [x]    Toileting [] [] [] [] [] [] [] [] [] [x]    Dressing [] [] [] [] [] [] [] [] [] [x]    Feeding [] [] [] [] [] [] [] [] [] [x]    Grooming [] [] [x] [] [] [] [] [] [] [] Washing face and brushing teeth in chair    Personal Device Care [] [] [] [] [] [] [] [] [] [x]    Functional Mobility [] [] [] [] [] [x] [x] [] [] [] x2   I=Independent, Mod I=Modified Independent, S=Supervision, SBA=Standby Assistance, CGA=Contact Guard Assistance,   Min=Minimal Assistance, Mod=Moderate Assistance, Max=Maximal Assistance, Total=Total Assistance, NT=Not Tested    MOBILITY: I Mod I S SBA CGA Min Mod Max Total  NT x2 Comments:   Supine to sit [] [] [] [] [] [x] [] [] [] [] [x]    Sit to supine [] [] [] [] [] [] [] [] [] [x] []    Sit to stand [] [] [] [] [] [] [x] [] [] [] [x] RW   Bed to chair [] [] [] [] [] [x] [] [] [] [] [x] RW   I=Independent, Mod I=Modified Independent, S=Supervision, SBA=Standby Assistance, CGA=Contact Guard Assistance,   Min=Minimal Assistance, Mod=Moderate Assistance, Max=Maximal Assistance, Total=Total Assistance, NT=Not [facility] 6 Clicks   Daily Activity Inpatient Short Form        How much help from another person does the patient currently need. .. Total A Lot A Little None   1. Putting on and taking off regular lower body clothing? [] 1   [x] 2   [] 3   [] 4   2. Bathing (including washing, rinsing, drying)? [] 1   [x] 2   [] 3   [] 4   3. Toileting, which includes using toilet, bedpan or urinal?   [] 1   [x] 2   [] 3   [] 4   4.   Putting on and taking off regular upper body clothing? [] 1   [] 2   [x] 3   [] 4   5. Taking care of personal grooming such as brushing teeth? [] 1   [] 2   [] 3   [x] 4   6. Eating meals? [] 1   [] 2   [] 3   [x] 4   © 2007, Trustees of [facility] [employer], under license to [employer]. All rights reserved     Score:  Initial: 17 Most Recent: X (Date: -- )   Interpretation of Tool:  Represents activities that are increasingly more difficult (i.e. Bed mobility, Transfers, Gait). PLAN:   FREQUENCY/DURATION: OT Plan of Care: 3 times/week for duration of hospital stay or until stated goals are met, whichever comes first.    PROBLEM LIST:   (Skilled intervention is medically necessary to address:)  1. Decreased ADL/Functional Activities  2. Decreased Activity Tolerance  3. Decreased AROM/PROM  4. Decreased Balance  5. Decreased Gait Ability  6. Decreased Transfer Abilities  7. Increased Pain   INTERVENTIONS PLANNED:   (Benefits and precautions of occupational therapy have been discussed with the patient.)  1. Self Care Training  2. Therapeutic Activity  3. Therapeutic Exercise/HEP  4. Neuromuscular Re-education  5. Education     TREATMENT:     EVALUATION: Low Complexity : (Untimed Charge)    TREATMENT:   ($$ Self Care/Home Management: 8-22 mins    )  Co-Treatment PT/OT necessary due to patient's decreased overall endurance/tolerance levels, as well as need for high level skilled assistance to complete functional transfers/mobility and functional tasks  Self Care (17 Minutes): Self care including Grooming, ADL Adaptive Equipment Training, Energy Conservation Training and functional transfers in prep for ADLs to increase independence and decrease level of assistance required.     TREATMENT GRID:  N/A    AFTER TREATMENT POSITION/PRECAUTIONS:  Chair, Needs within reach, RN notified and Visitors at bedside    INTERDISCIPLINARY COLLABORATION:  RN/PCT, PT/PTA and OT/HILLMAN    TOTAL TREATMENT DURATION:  OT Patient Time In/Time Out  Time In: 1030  Time Out: Denzel Chang 1490, [place]

## 2021-11-29 NOTE — CONSULTS
Infectious Disease Consult    Today's Date: 11/29/2021   Admit Date: 11/27/2021    Impression:   · Staph aureus left TKA infection  -Status post washout with polyexchange 11/28  -L TKA placement 10/29/21    Plan:   · Continue vancomycin. Stop Zosyn. Add rifampin. · Will follow staph aureus susceptibilities and will make final antibiotic plan from this. We are planning 6 weeks of IV antibiotic therapy then de-escalation to oral suppression. · [id number] [address] for PICC line. Anti-infectives:   · Vancomycin 11/27-  ·  Zosyn 11/27-11/29    Subjective:   Date of Consultation:  November 29, 2021  Referring Physician: Toshia Mejia    Patient is a 64 y.o. male with medical history for osteoarthritis status post right TKA 3/2021, gout, CKD 2, morbid obesity who was admitted on November 27 for acute onset left knee pain. Patient recently underwent left TKA on October 29. Patient reports that he did well until Wednesday-November 24. Last week patient went on a short trip to [facility] and walked around than usual.  He also soaked in a hot tub. He noticed on November 24 that his right knee began to swell and became very painful. Dr. Toshia Mejia called in an antibiotic on Friday--patient cannot remember the name this did take a few doses. Patient symptoms continued and he presented for admission November 27. Dr. Toshia Mejia took him to the OR on November 28 for a washout with a polyexchange. Operative note reveals purulence to knee joint and prepatellar bursa. Operative culture with staph aureus. Postoperatively started on vancomycin and Zosyn. His wife was present. Denies fever, chills, sweats.     Patient Active Problem List   Diagnosis Code    HLD (hyperlipidemia) E78.5    Obesity E66.9    HTN (hypertension) I10    TRINA on CPAP G47.33, Z99.89    Gout M10.9    Osteoarthritis M19.90    Dyspnea R06.00    CAD (coronary artery disease) I25.10    GERD (gastroesophageal reflux disease) K21.9    Obesity, Class III, BMI 40-49.9 (morbid obesity) ([facility] E66.01    Chronic idiopathic gout involving toe of left foot without tophus M1A.0720    Acute pain of left knee M25.562    Stage 3 chronic kidney disease (HCC) N18.30    Localized osteoarthritis of left knee M17.12    Left knee pain M25.562    Septic arthritis of knee, left (HCC) M00.9     Past Medical History:   Diagnosis Date    CAD (coronary artery disease) 11/14/2012    cath done 9/15/2019 at [facility] cath lab: NO sig coronary atherosclerosis disease and preserved LVSF with EF at 50%    Chronic kidney disease 07/12/2021    per notes in patient chart from ED visit on 7/12/2021; hospital two months ago - badly dehydrated denies kidney disease and does not see neph or uro    Dyspnea 11/14/2012    only under exertion     GERD (gastroesophageal reflux disease)     controlled with medication     Gout     allopurinol    Hiatal hernia     History of COVID-19 10/2020    respiratory symptoms but no hosp no vent     History of EKG 07/12/2021    EKG done at [facility] SR poor R wave progression    HLD (hyperlipidemia) 11/14/2012    HTN (hypertension) 11/14/2012    Hx of cardiovascular stress test 07/13/2021    LVEF 65% normal per interpretation from [address]     atorvastatin    Obesity 11/14/2012    Osteoarthritis 11/14/2012    Psychiatric disorder     on medication for anxiety depression    Sleep apnea 11/14/2012    cpap       Family History   Problem Relation Age of Onset    Diabetes Mother     Cancer Father         lung and brain cancer    Diabetes Father     Diabetes Paternal Grandmother       Social History     Tobacco Use    Smoking status: Never Smoker    Smokeless tobacco: Never Used   Substance Use Topics    Alcohol use:  Yes     Alcohol/week: 2.0 standard drinks     Types: 2 Cans of beer per week     Comment: drinks once monthly     Past Surgical History:   Procedure Laterality Date    HX APPENDECTOMY      1983    HX CARPAL TUNNEL RELEASE 06/29/2010    HX CHOLECYSTECTOMY      Dr. Giovanni Ogden 10/09/2007    HX COLONOSCOPY  07/14/2021    Dr. Tommy Harris; benign polyp of the sigmoid colon, diverticulosis of large intestine without perforation    HX ENDOSCOPY  09/14/2021    Dr. Tommy Harris; hiatal hernia, GERD without esophagitis    HX KNEE REPLACEMENT Right 03/08/2021    Dr. Samra Gonsales HX LITHOTRIPSY      righ 03/03/2008 and left 06/30/2008    HX ORTHOPAEDIC      hand tendon repair    AR CARDIAC SURG PROCEDURE UNLIST      cath no intervention has seen cardio since cath and states heart is fine       Prior to Admission medications    Medication Sig Start Date End Date Taking? Authorizing Provider   atorvastatin (LIPITOR) 40 mg tablet TAKE 1 TABLET BY MOUTH EVERY DAY  Patient taking differently: every other day. 11/10/21  Yes Harpal Nicole MD   chlorthalidone (HYGROTON) 25 mg tablet Take 25 mg by mouth every six (6) hours as needed for PRN Reason (Other) (hiccups). Yes Provider, Historical   oxyCODONE IR (ROXICODONE) 5 mg immediate release tablet Take 5 mg by mouth every four (4) hours as needed for Pain. Indications: pain   Yes Provider, Historical   oxyCODONE IR (ROXICODONE) 5 mg immediate release tablet Take 5 mg by mouth every four (4) hours as needed for Pain. Yes Pee Dela Cruz MD   chlorthalidone (HYGROTON) 25 mg tablet Take 25 mg by mouth daily. Yes Pee Dela Cruz MD   aspirin delayed-release 81 mg tablet Take 81 mg by mouth daily. Yes Provider, Historical   FLUoxetine (PROzac) 20 mg capsule Take 60 mg by mouth daily. Yes Provider, Historical   prazosin (MINIPRESS) 2 mg capsule Take 4 mg by mouth nightly. Yes Provider, Historical   diclofenac EC (VOLTAREN) 50 mg EC tablet Take 50 mg by mouth two (2) times a day. Yes Provider, Historical   pantoprazole (PROTONIX) 40 mg tablet Take 40 mg by mouth two (2) times a day. Yes Provider, Historical   allopurinoL (ZYLOPRIM) 300 mg tablet Take 300 mg by mouth two (2) times a day.    Yes Provider, Historical   lisinopril-hydroCHLOROthiazide (PRINZIDE, ZESTORETIC) 20-12.5 mg per tablet Take 2 Tablets by mouth daily. Yes Provider, Historical   zolpidem (Ambien) 10 mg tablet Take 10 mg by mouth nightly as needed for Sleep. Yes Provider, Historical       Allergies   Allergen Reactions    Oxycodone Vertigo     Patient denies         Review of Systems:  A comprehensive review of systems was negative except for that written in the History of Present Illness. Objective:     Visit Vitals  /79 (BP 1 Location: Right upper arm, BP Patient Position: Sitting)   Pulse 67   Temp 97.5 °F (36.4 °C)   Resp 20   Ht 5' 9\" (1.753 m)   Wt 130.2 kg (287 lb)   SpO2 93%   BMI 42.38 kg/m²     Temp (24hrs), Av.7 °F (36.5 °C), Min:97.2 °F (36.2 °C), Max:98.1 °F (36.7 °C)       Lines:  Peripheral IV:       Physical Exam:    General:  Alert, cooperative, well noursished, well developed, appears stated age   Eyes:  Sclera anicteric. Pupils equally round and reactive to light. Mouth/Throat: Mucous membranes normal, oral pharynx clear   Neck: Supple   Lungs:   Clear to auscultation bilaterally, good effort   CV:  Regular rate and rhythm,no murmur, click, rub or gallop   Abdomen:   Soft, non-tender.  bowel sounds normal. non-distended   Extremities: No cyanosis or edema   Skin: Skin color, texture, turgor normal. no acute rash or lesions   Lymph nodes: Cervical and supraclavicular normal   Musculoskeletal:  L Knee in postop wrap   Lines/Devices:  Intact, no erythema, drainage or tenderness   Psych: Alert and oriented, normal mood affect given the setting       Data Review:     CBC:  Recent Labs     21  1142 21  0413   WBC 9.9  --    GRANS 85*  --    MONOS 6  --    EOS 0*  --    ANEU 8.3*  --    ABL 0.9  --    HGB 10.3* 10.3*   HCT 32.6*  --      --        BMP:  Recent Labs     21  0413   CREA 1.11       LFTS:  Recent Labs     21  0951   TBILI 0.5   ALT 24   AP 81   TP 6.6   ALB 2.1* Microbiology:     All Micro Results     Procedure Component Value Units Date/Time    CULTURE, BODY FLUID Myrle Loach STAIN [[id number]]  (Abnormal) Collected: 11/28/21 0900    Order Status: Completed Specimen: Body Fluid from Joint Fluid Updated: 11/29/21 0825     Special Requests: PATIENT HAD A DOSE OF VANCOMYCIN ON 11/28/21     GRAM STAIN 40 TO 80  WBCS/OIF            MODERATE GRAM POSITIVE COCCI           Culture result:       HEAVY STAPHYLOCOCCUS AUREUS SENSITIVITY TO FOLLOW          COVID-19 RAPID TEST [[id number]] Collected: 11/28/21 0930    Order Status: Completed Specimen: Nasopharyngeal Updated: 11/28/21 1047     Specimen source Nasopharyngeal        COVID-19 rapid test Not detected        Comment:      The specimen is NEGATIVE for SARS-CoV-2, the novel coronavirus associated with COVID-19. A negative result does not rule out COVID-19. This test has been authorized by the FDA under an Emergency Use Authorization (EUA) for use by authorized laboratories. Fact sheet for Healthcare Providers: ConventionUpdate.co.nz  Fact sheet for Patients: ConventionUpdate.co.nz       Methodology: Isothermal Nucleic Acid Amplification         CULTURE, BODY FLUID Myrle Loach STAIN [[id number]]     Order Status: Canceled Specimen:  Body Fluid from Joint Fluid           Imaging:   See HPI/EPIC     Signed By: Mayra Viveros NP     November 29, 2021

## 2021-11-29 NOTE — PROGRESS NOTES
[facility] HOSPITALIST CONSULT      Patient ID:  NAME:  Grey Negro. Age/Sex: 64 y.o. male  :   1960   MRN:   [id number]    Admission Date: 2021  Consult Date: 2021  Chief Complaint: Left knee pain and swelling  Reason for Admission: Septic arthritis of knee, left ([facility] [address]  Reason for Consult: HTN and TRINA    Active Assessment/Plan (MDM):      HLD (hyperlipidemia) (2012)  - Continue Lipitor      HTN (hypertension) (2012)  - Continue Chlorthalidone    : Will monitor. TRINA on CPAP (2012)  - Continue CPAP with resting  - Currently hypoxic after anesthesia  - Check CXR  - Denies SOB  : X-ray 1 view negative. Not using oxygen in the morning. Uses CPAP at night. Denies any respiratory symptoms. Will monitor. Gout (2012)  - Continue Allopurinol      GERD (gastroesophageal reflux disease) (2013)  - Continue PPI         Stage 3 chronic kidney disease ([facility] [address] (7/15/2021)  - Creatinine stable    #29: Lab work ordered. Surgery and surgery related management including DVT prophylaxis, drop in hemoglobin and pain management per primary team.    Rest management per primary team.    Please notify medicine team if drop in hemoglobin is more than expected for the surgery. Thanks for consultation. We will continue to follow along on daily basis. Please notify on-call medicine physician with questions or if patient clinical condition changes.      All Medical Diagnoses:     Hospital Problems as of 2021 Date Reviewed: 7/15/2021          Codes Class Noted - Resolved POA    * (Principal) Septic arthritis of knee, left ([facility] [address] ICD-10-CM: M00.9  ICD-9-CM: 711.06  2021 - Present Yes        Left knee pain ICD-10-CM: M25.562  ICD-9-CM: 719.46  2021 - Present Yes        Acute pain of left knee ICD-10-CM: M25.562  ICD-9-CM: 719.46  7/15/2021 - Present Yes        Stage 3 chronic kidney disease ([facility] [address] ICD-10-CM: N18.30  ICD-9-CM: 585.3  7/15/2021 - Present Yes        Obesity, Class III, BMI 40-49.9 (morbid obesity) ([facility] [address] ICD-10-CM: E66.01  ICD-9-CM: 278.01  2019 - Present Yes        GERD (gastroesophageal reflux disease) ICD-10-CM: K21.9  ICD-9-CM: 530.81  2013 - Present Yes        HLD (hyperlipidemia) ICD-10-CM: E78.5  ICD-9-CM: 272.4  2012 - Present Yes        HTN (hypertension) ICD-10-CM: I10  ICD-9-CM: 401.9  2012 - Present Yes        TRINA on CPAP ICD-10-CM: G47.33, Z99.89  ICD-9-CM: 327.23, V46.8  2012 - Present Yes        Gout ICD-10-CM: M10.9  ICD-9-CM: 274.9  2012 - Present Yes              HPI:     Lalitha Murphy is a 64year old CM with a PMH of CKD (Stage III), CAD, HTN, HLD, TRINA on CPAP, and left TKA on 10/29/21 who presented from an outside ER with acute right knee pain, swelling, and erythema after going to [place] and walking on it and getting in hot tub/pool. He presented to the ER with pain and swelling. Orthopedics admitted and took to the OR for I&D with washout of purulent drainage. A drain was placed. Currently he is on his CPAP post-surgery but alert and offers only complaint of left knee pain of 6/10. Denies CP/SOB/cough. Denies F/C. Denies N/V/D. : Patient admitted to Ortho service with septic arthritis status post I&D. ID was consulted. Medicine consulted for management of hypertension. Patient was on CPAP he took CPAP for. Feeling better. As per patient is dealing with this infection for a month now. Denies any nausea, vomiting, chest pain or palpitation. Respiratory system negative except mentioned above.      Objective:       Visit Vitals  /79 (BP 1 Location: Right upper arm, BP Patient Position: Sitting)   Pulse 67   Temp 97.5 °F (36.4 °C)   Resp 20   Ht 5' 9\" (1.753 m)   Wt 130.2 kg (287 lb)   SpO2 93%   BMI 42.38 kg/m²        Temp (24hrs), Av.7 °F (36.5 °C), Min:97.2 °F (36.2 °C), Max:98.1 °F (36.7 °C)      Oxygen Therapy  O2 Sat (%): 93 % (11/29/21 1131)  Pulse via Oximetry: 103 beats per minute (11/28/21 1731)  O2 Device: None (Room air) (11/29/21 1030)  O2 Flow Rate (L/min): 2 l/min (11/29/21 0548)      Physical Exam:    General:    Alert, cooperative, no distress, appears stated age. Eyes:   PERRLA EOMI Anicteric  Head:   Normocephalic, without obvious abnormality, atraumatic. Lungs:   Decrease entry bilateral lower lobe otherwise clear to auscultation bilaterally. No Wheezing or Rhonchi. No rales. Heart:  Tachy, reg rhythm,  no murmur, rub or gallop. No JVD. Abdomen:   Soft, non-tender. Not distended. Bowel sounds normal.   MSK:  Left knee with edema, LIAM drain in place. No clubbing or cyanosis. No deformities/lesions. Skin:     Texture, turgor normal. No rashes or lesions. No Jaundice. Neurologic: Alert and oriented x 3, moving all 4 extremities except the left one is limited secondary to surgery. Speech normal.  Psychiatry:      No abnormal mood or behavior noted. Lab/Data Review:  Recent Results (from the past 24 hour(s))   HEMOGLOBIN    Collection Time: 11/29/21  4:13 AM   Result Value Ref Range    HGB 10.3 (L) 13.6 - 17.2 g/dL   VANCOMYCIN, RANDOM    Collection Time: 11/29/21  4:13 AM   Result Value Ref Range    Vancomycin, random 12.4 UG/ML   CREATININE    Collection Time: 11/29/21  4:13 AM   Result Value Ref Range    Creatinine 1.11 0.8 - 1.5 MG/DL   HEPATIC FUNCTION PANEL    Collection Time: 11/29/21  9:51 AM   Result Value Ref Range    Protein, total 6.6 6.3 - 8.2 g/dL    Albumin 2.1 (L) 3.2 - 4.6 g/dL    Globulin 4.5 (H) 2.3 - 3.5 g/dL    A-G Ratio 0.5 (L) 1.2 - 3.5      Bilirubin, total 0.5 0.2 - 1.1 MG/DL    Bilirubin, direct 0.2 <0.4 MG/DL    Alk.  phosphatase 81 50 - 136 U/L    AST (SGOT) 26 15 - 37 U/L    ALT (SGPT) 24 12 - 65 U/L   C REACTIVE PROTEIN, QT    Collection Time: 11/29/21  9:51 AM   Result Value Ref Range    C-Reactive protein 18.9 (H) 0.0 - 0.9 mg/dL Imaging:  XR CHEST SNGL V    Result Date: 11/28/2021  1. No acute cardiopulmonary process. CPT code(s) 79900     DUPLEX LOWER EXT VENOUS LEFT    Result Date: 11/28/2021  No evidence of deep venous thrombosis in the left lower extremity. Cultures: All Micro Results     Procedure Component Value Units Date/Time    CULTURE, BODY FLUID Roosvelt Antonina STAIN [[id number]]  (Abnormal) Collected: 11/28/21 0900    Order Status: Completed Specimen: Body Fluid from Joint Fluid Updated: 11/29/21 0825     Special Requests: PATIENT HAD A DOSE OF VANCOMYCIN ON 11/28/21     GRAM STAIN 40 TO 80  WBCS/OIF            MODERATE GRAM POSITIVE COCCI           Culture result:       HEAVY STAPHYLOCOCCUS AUREUS SENSITIVITY TO FOLLOW          COVID-19 RAPID TEST [[id number]] Collected: 11/28/21 0930    Order Status: Completed Specimen: Nasopharyngeal Updated: 11/28/21 1047     Specimen source Nasopharyngeal        COVID-19 rapid test Not detected        Comment:      The specimen is NEGATIVE for SARS-CoV-2, the novel coronavirus associated with COVID-19. A negative result does not rule out COVID-19. This test has been authorized by the FDA under an Emergency Use Authorization (EUA) for use by authorized laboratories. Fact sheet for Healthcare Providers: ConventionUpdate.co.nz  Fact sheet for Patients: ConventionUpdate.co.nz       Methodology: Isothermal Nucleic Acid Amplification         CULTURE, BODY FLUID Roosvelt Antonina STAIN [[id number]]     Order Status: Canceled Specimen:  Body Fluid from Joint Fluid           Active Problems:     Principal Diagnosis Septic arthritis of knee, left [facility]    Hospital Problems as of 11/29/2021 Date Reviewed: 7/15/2021          Codes Class Noted - Resolved POA    * (Principal) Septic arthritis of knee, left ([facility] [address] ICD-10-CM: M00.9  ICD-9-CM: 711.06  11/28/2021 - Present Yes        Left knee pain ICD-10-CM: M25.562  ICD-9-CM: 719.46  11/27/2021 - Present Yes Acute pain of left knee ICD-10-CM: M25.562  ICD-9-CM: 719.46  7/15/2021 - Present Yes        Stage 3 chronic kidney disease ([facility] ICD-10-CM: N18.30  ICD-9-CM: 585.3  7/15/2021 - Present Yes        Obesity, Class III, BMI 40-49.9 (morbid obesity) ([facility] ICD-10-CM: E66.01  ICD-9-CM: 278.01  8/27/2019 - Present Yes        GERD (gastroesophageal reflux disease) ICD-10-CM: K21.9  ICD-9-CM: 530.81  5/14/2013 - Present Yes        HLD (hyperlipidemia) ICD-10-CM: E78.5  ICD-9-CM: 272.4  11/14/2012 - Present Yes        HTN (hypertension) ICD-10-CM: I10  ICD-9-CM: 401.9  11/14/2012 - Present Yes        TRINA on CPAP ICD-10-CM: G47.33, Z99.89  ICD-9-CM: 327.23, V46.8  11/14/2012 - Present Yes        Gout ICD-10-CM: M10.9  ICD-9-CM: 274.9  11/14/2012 - Present Yes                  Crispin Buchanan MD  [employer] Hospitalist [facility]  11/29/2021

## 2021-11-29 NOTE — PROGRESS NOTES
Met with patient in room - ppe + social distance maintained. Patient confirmed demographics - stated he lives at home with his spouse. Patient's PCP is Dr. Audrey Morrison and he uses [facility] pharmacy on [address]. In [facility]. Patient stated he uses a cane, walker, and cpap at home. No other dme reported. PT/OT following patient and are currently recommending home health. Patient wishes for [facility] [place] to provide home health services. Patient stated he has been told he will be on iv abx @ d/c and will be getting a picc. Will hold off on home health order to determine exactly what services patient will need @ d/c.     CM following.      Care Management Interventions  Mode of Transport at Discharge: Self  Transition of Care Consult (CM Consult): [address]: Yes  Physical Therapy Consult: Yes  Occupational Therapy Consult: Yes  Support Systems: Spouse/Significant Other  Confirm Follow Up Transport: Family  The Patient and/or Patient Representative was Provided with a Choice of Provider and Agrees with the Discharge Plan?: Yes  Freedom of Choice List was Provided with Basic Dialogue that Supports the Patient's Individualized Plan of Care/Goals, Treatment Preferences and Shares the Quality Data Associated with the Providers?: Yes   Resource Information Provided?: No  Discharge Location  Discharge Placement: Home with home health

## 2021-11-29 NOTE — OP NOTES
[facility]  OPERATIVE REPORT    Name:  Deanna Bean  MR#:  [id number]  :  1960  ACCOUNT #:  [de-identified]  DATE OF SERVICE:  2021    PREOPERATIVE DIAGNOSIS:  Septic left knee. POSTOPERATIVE DIAGNOSIS:  jacobo. PROCEDURE PERFORMED:  Irrigation debridement deep bone and poly swap. SURGEON:  Luis Sanchez MD    ASSISTANT:  none    ANESTHESIA:  general.    COMPLICATIONS:  None noted. SPECIMENS REMOVED:  none. IMPLANTS:  Poly, see brief op note. ESTIMATED BLOOD LOSS:  minimal.    BRIEF HISTORY:  The patient is a 70-year-old gentleman. On 10/29/2021, he had a total knee done at this facility. He did well with that. Several days prior to this admission, he traveled to [facility], relayed that he walked on it, was in a hot tub and swimming pool, been having increasing pain, inability to ambulate. His wife contacted our office. She was advised to proceed at [address] [place] [address]. Apparently, she went ahead and came to [facility] where he was evaluated. I was called by the orthopedic attending on call. We accepted him in transfer for definitive treatment. We aspirated his knee this morning and noted we got 10 mL of purulent material.  He appears clinically to have a septic knee and with his labs and we preop'ed him for that procedure, irrigation and debridement of the left knee with tibial poly swab. Informed consent was obtained. He understands the risks and complications to proceed. He understands that he may have recurrence of infection that may need us to remove all of these components and more complicated procedure. PROCEDURE IN DETAIL:  The patient was seen in the preoperative area. His knee was marked. His chart was updated. His H and P was signed. He had a block placed by Anesthesia, an adductor canal block. He was taken to operating room #9. Successful general anesthetic was achieved. He received 3 g of Ancef perioperatively.   A tourniquet was placed on the upper thigh with Webril padding. The leg was prepped and draped into a sterile field. A time-out was effected by all the operative team and confirmed and we inflated the tourniquet with gravity exsanguination only and inflated to 300 mmHg for a total of 72 minutes. We utilized his previous incision. We opened this up. A large amount of purulent debris immediately expresses off from the knee. It appeared to be tracking from one of the quite suture granuloma areas in the midportion of the incision. We removed all the sutures from his extensor mechanism repair. There was purulence deep in the joint as well. We removed soft tissue copiously, all the sutures we could visualize. We debrided sharply with a rongeur and knife. We debrided the wound edges that have been involved back to good healthy tissue. We Pulsavac lavaged with a total of 6 liters of Pulsavac lavage. We removed the tibial polyethylene component. We scrubbed the surface with a surgical scrub brush with Betadine. We Pulsavac again with three more liters of Pulsavac lavage solution. We then placed in with an 11 mm x 6 tibial component, locked this in place. The knee did reach full extension with good stability and normal flexion. We also debrided around the patella, but left this in place as it was cemented. We then placed in two large flat Hemovac drains with  the medial and lateral side of the knee through separate stab wounds. We then closed the knee in a layered fashion with interrupted figure-of-eight #1 Vicryl, deeply with 0 Vicryl, 2-0 Vicryl and surgical clips on the skin. The drains were also set in place with #2 Ethilon. Sterile bulky dressing was applied. He tolerated the procedure well without any complications noted. No new labs were sent. He will be admitted to our service.       Abdi Diop MD      [id number]  D:  11/28/2021 16:02  T:  11/28/2021 19:23  JOB #:  [id number]

## 2021-11-29 NOTE — PROGRESS NOTES
Pt still having steady pain. Slightly repositioned knee and gave pt scheduled non narcotic pain med-tylenol and celebrex.    Pt wants to try to sleep/

## 2021-11-29 NOTE — PROGRESS NOTES
Orthopedic Joint Progress Note    2021  Admit Date: 2021  Admit Diagnosis: Left knee pain [M25.562]    1 Day Post-Op I and D and poly swap for septic knee. Subjective:     Sheri Lopez. alert, oriented and much less pain    Review of Systems: Pertinent items are noted in HPI. Objective:     PT/OT:     PATIENT MOBILITY                           Vital Signs:    Blood pressure 113/73, pulse 73, temperature 98 °F (36.7 °C), resp. rate 18, height 5' 9\" (1.753 m), weight 130.2 kg (287 lb), SpO2 93 %.   Temp (24hrs), Av.8 °F (36.6 °C), Min:97.2 °F (36.2 °C), Max:98.1 °F (36.7 °C)      Pain Control:   Pain Assessment  Pain Scale 1: FLACC  Pain Intensity 1: 6  Pain Onset 1: post op  Pain Location 1: Knee  Pain Orientation 1: Left  Pain Description 1: Aching  Pain Intervention(s) 1: Medication (see MAR)    Meds:  Current Facility-Administered Medications   Medication Dose Route Frequency    allopurinoL (ZYLOPRIM) tablet 300 mg  300 mg Oral BID    atorvastatin (LIPITOR) tablet 40 mg  40 mg Oral DAILY    chlorthalidone (HYGROTON) tablet 25 mg  25 mg Oral DAILY    FLUoxetine (PROzac) capsule 60 mg  60 mg Oral DAILY    pantoprazole (PROTONIX) tablet 40 mg  40 mg Oral BID    prazosin (MINIPRESS) capsule 4 mg  4 mg Oral QHS    zolpidem (AMBIEN) tablet 10 mg  10 mg Oral QHS PRN    alcohol 62% (NOZIN) nasal  1 Ampule  1 Ampule Topical Q12H    0.9% sodium chloride infusion  100 mL/hr IntraVENous CONTINUOUS    sodium chloride (NS) flush 5-40 mL  5-40 mL IntraVENous Q8H    sodium chloride (NS) flush 5-40 mL  5-40 mL IntraVENous PRN    acetaminophen (TYLENOL) tablet 1,000 mg  1,000 mg Oral Q6H    celecoxib (CELEBREX) capsule 200 mg  200 mg Oral Q12H    oxyCODONE IR (ROXICODONE) tablet 10 mg  10 mg Oral Q4H PRN    naloxone (NARCAN) injection 0.2-0.4 mg  0.2-0.4 mg IntraVENous Q10MIN PRN    diphenhydrAMINE (BENADRYL) capsule 25 mg  25 mg Oral Q4H PRN    senna-docusate (PERICOLACE) 8.6-50 mg per tablet 2 Tablet  2 Tablet Oral DAILY    piperacillin-tazobactam (ZOSYN) 4.5 g in 0.9% sodium chloride (MBP/ADV) 100 mL MBP  4.5 g IntraVENous Q8H    morphine injection 2 mg  2 mg IntraVENous Q3H PRN    HYDROmorphone (DILAUDID) tablet 2 mg  2 mg Oral Q4H PRN    HYDROmorphone (DILAUDID) injection 2 mg  2 mg IntraVENous Q4H PRN    aspirin delayed-release tablet 81 mg  81 mg Oral Q12H    vancomycin (VANCOCIN) 1500 mg in  ml infusion  1,500 mg IntraVENous Q24H        LAB:    Lab Results   Component Value Date/Time    INR 1.0 09/03/2019 09:42 AM     Lab Results   Component Value Date/Time    HGB PENDING 11/29/2021 09:51 AM    HGB 10.3 (L) 11/29/2021 04:13 AM    HGB 13.4 (L) 10/30/2021 05:01 AM       Wound Knee Anterior; Left Surgical site 11/27/21 (Active)   Dressing Status Clean; Dry; Intact 11/28/21 1900   Dressing/Treatment Open to air 11/27/21 2320   Drainage Amount Moderate 11/27/21 2320   Drainage Description Purulent; Serous 11/27/21 2320   Wound Odor None 11/27/21 2320   Number of days:        Incision 11/28/21 Leg Left (Active)   Number of days: 1       [REMOVED] Incision 10/29/21 Knee Left (Removed)   Number of days: 20         Physical Exam:  No significant changes, NVI. Dressing intact and drains functioning. No drainage on dressing. Assessment:      Principal Problem:    Septic arthritis of knee, left ([facility] [address] (11/28/2021)    Active Problems:    HLD (hyperlipidemia) (11/14/2012)      HTN (hypertension) (11/14/2012)      TRINA on CPAP (11/14/2012)      Gout (11/14/2012)      GERD (gastroesophageal reflux disease) (5/14/2013)      Obesity, Class III, BMI 40-49.9 (morbid obesity) ([facility] [address] (8/27/2019)      Acute pain of left knee (7/15/2021)      Stage 3 chronic kidney disease ([facility] [address] (7/15/2021)      Left knee pain (11/27/2021)         Plan:S. Aureus on culture and specific ID pending     Continue PT/OT/Rehab  Consult: PT, ID, and hospitalist service.     Patient Expects to be Discharged to[de-identified] [facility]/mobile home

## 2021-11-29 NOTE — PROGRESS NOTES
ACUTE PHYSICAL THERAPY GOALS:  (Developed with and agreed upon by patient and/or caregiver.)  (1.) Ania Parham. will move from supine to sit and sit to supine , scoot up and down and roll side to side with CONTACT GUARD ASSIST within 7 treatment day(s). (2.) Natee Dione. will transfer from bed to chair and chair to bed with CONTACT GUARD ASSIST using the least restrictive device within 7 treatment day(s). (3.) Natee Dione. will ambulate with CONTACT GUARD ASSIST for 200+ feet with the least restrictive device within 7 treatment day(s). (4.) Donnise Stake. will perform standing static and dynamic balance activities x 15 minutes with CONTACT GUARD ASSIST to improve safety within 7 treatment day(s). (5.) Natee Dione. will ascend and descend 2 stairs using one hand rail(s) with CONTACT GUARD ASSIST to improve functional mobility and safety within 7 treatment day(s). (6.) Natee Dione. will perform therapeutic exercises x 15 min for HEP with INDEPENDENCE to improve strength, endurance, and functional mobility within 7 treatment day(s).      PHYSICAL THERAPY ASSESSMENT: Initial Assessment and AM PT Treatment Day # 1    Ania Paz is a 64 y.o. male   PRIMARY DIAGNOSIS: Septic arthritis of knee, left (HCC)  Left knee pain [M25.562]  Procedure(s) (LRB):  EXTREMITY IRRIGATION AND DEBRIDEMENT LEFT KNEE (Left)  1 Day Post-Op  Reason for Referral:   ICD-10: Treatment Diagnosis: Pain in Left Knee (M25.562)  Stiffness of Left Knee, Not elsewhere classified (M25.662)  Difficulty in walking, Not elsewhere classified (R26.2)  Other abnormalities of gait and mobility (R26.89)  INPATIENT: Payor: [facility] MEDICARE COMPLETE / Plan: [facility] MEDICARE COMPLETE / Product Type: Managed Care Medicare /     ASSESSMENT:     REHAB RECOMMENDATIONS:   Recommendation to date pending progress:  [address] [place] [address]  Equipment:    None (pt already has RW)     PRIOR LEVEL OF FUNCTION:  (Prior to Hospitalization) INITIAL/CURRENT LEVEL OF FUNCTION:  (Most Recently Demonstrated)   Bed Mobility:   Independent  Sit to Stand:   Modified Independent  Transfers:   Modified Independent  Gait/Mobility:   Modified Independent Bed Mobility:   Minimal Assistance  Sit to Stand:   Moderate Assistance x 2  Transfers:   Minimal Assistance x 2  Gait/Mobility:   Minimal Assistance x 2     ASSESSMENT:    WBAT LLE    Mr. Arvin Mcdermott is a 64year old M who presents s/p LLE knee I&D after getting an infection s/p LLE TKA (10/29/21) after using a hot tub and swimming pool post-op. This date pt performs mobility including bed mobility Min A with additional time and assist with LLE, sit <> Stand transfer Mod A x2 with BUE support at RW, and standing balance activities and steps to chair with Min Ax2/RW. Pt quite limited by pain at this point; AROM LLE knee limited secondary to pain and stiffness; significant pain inhibition post-operatively. Pt presents as functioning below his baseline, with deficits in mobility including transfers, gait, balance, and activity tolerance. Pt will benefit from skilled therapy services to address stated deficits to promote return to highest level of function, independence, and safety. Will continue to follow. SUBJECTIVE:   Mr. Arvin Mcdermott states, \"It hurts really bad. \"    SOCIAL HISTORY/LIVING ENVIRONMENT: Lives with his spouse,1 level home, 2 RYAN. Has RW from surgery. No falls. Home Environment: Trailer/mobile home  One/Two Story Residence: One story  Living Alone: No  Support Systems: Spouse/Significant Other  OBJECTIVE:     PAIN: VITAL SIGNS: LINES/DRAINS:   Pre Treatment: Pain Screen  Pain Scale 1: Numeric (0 - 10)  Pain Intensity 1: 7  Pain Onset 1: post op  Pain Location 1: Knee  Pain Orientation 1: Left  Pain Description 1: Aching;  Sore  Post Treatment: 8/10 Vital Signs  O2 Device: None (Room air) IV, LIAM Drain and x2 from LLE incision site  O2 Device: None (Room air)     GROSS EVALUATION:  BLE Within Functional Limits Abnormal/ Functional Abnormal/ Non-Functional (see comments) Not Tested Comments:   AROM [] [x] [x] []  LLE limited   PROM [] [x] [] []  LLE limited   Strength [] [x] [] []  LLE weak   Balance [] [x] [] []  unsteady d/t offloading LLE   Posture [] [x] [] []  forward head, rounded shoulders   Sensation [x] [] [] []    Coordination [x] [] [] []    Tone [] [] [] [x]    Edema [] [] [] [x]    Activity Tolerance [] [x] [] []  limited due to pain LLE    [] [] [] []      COGNITION/  PERCEPTION: Intact Impaired   (see comments) Comments:   Orientation [x] []    Vision [x] []    Hearing [x] []    Command Following [x] []    Safety Awareness [] [x]     [] []      MOBILITY: I Mod I S SBA CGA Min Mod Max Total  NT x2 Comments:   Bed Mobility    Rolling [] [] [] [] [] [x] [] [] [] [] []    Supine to Sit [] [] [] [] [] [x] [] [] [] [] []    Scooting [] [] [] [] [] [x] [] [] [] [] []    Sit to Supine [] [] [] [] [] [x] [] [] [] [] []    Transfers    Sit to Stand [] [] [] [] [] [] [x] [] [] [] [x]    Bed to Chair [] [] [] [] [] [x] [] [] [] [] [x]    Stand to Sit [] [] [] [] [] [x] [] [] [] [] [x]    I=Independent, Mod I=Modified Independent, S=Supervision, SBA=Standby Assistance, CGA=Contact Guard Assistance,   Min=Minimal Assistance, Mod=Moderate Assistance, Max=Maximal Assistance, Total=Total Assistance, NT=Not Tested  GAIT: I Mod I S SBA CGA Min Mod Max Total  NT x2 Comments:   Level of Assistance [] [] [] [] [] [x] [] [] [] [] [x]    Distance 5'    DME Rolling Walker    Gait Quality Antalgic, short, shuffled, slow steps    Weightbearing Status WBAT LLE    I=Independent, Mod I=Modified Independent, S=Supervision, SBA=Standby Assistance, CGA=Contact Guard Assistance,   Min=Minimal Assistance, Mod=Moderate Assistance, Max=Maximal Assistance, Total=Total Assistance, NT=Not Tested    [address] [facility] [address] [id number] [id number] 6 [facility] Mobility Inpatient Short Form How much difficulty does the patient currently have. .. Unable A Lot A Little None   1. Turning over in bed (including adjusting bedclothes, sheets and blankets)? [] 1   [] 2   [x] 3   [] 4   2. Sitting down on and standing up from a chair with arms ( e.g., wheelchair, bedside commode, etc.)   [] 1   [x] 2   [] 3   [] 4   3. Moving from lying on back to sitting on the side of the bed? [] 1   [] 2   [x] 3   [] 4   How much help from another person does the patient currently need. .. Total A Lot A Little None   4. Moving to and from a bed to a chair (including a wheelchair)? [] 1   [] 2   [x] 3   [] 4   5. Need to walk in hospital room? [] 1   [x] 2   [] 3   [] 4   6. Climbing 3-5 steps with a railing? [] 1   [x] 2   [] 3   [] 4   © 2007, Trustees of [facility] [employer], under license to [employer]. All rights reserved     Score:  Initial: 15 Most Recent: X (Date: -- )    Interpretation of Tool:  Represents activities that are increasingly more difficult (i.e. Bed mobility, Transfers, Gait). PLAN:   FREQUENCY/DURATION: PT Plan of Care: Daily for duration of hospital stay or until stated goals are met, whichever comes first.    PROBLEM LIST:   (Skilled intervention is medically necessary to address:)  1. Decreased Activity Tolerance  2. Decreased AROM/PROM  3. Decreased Balance  4. Decreased Coordination  5. Decreased Gait Ability  6. Decreased Strength  7. Decreased Transfer Abilities  8. Increased Pain   INTERVENTIONS PLANNED:   (Benefits and precautions of physical therapy have been discussed with the patient.)  1. Therapeutic Activity  2. Therapeutic Exercise/HEP  3. Neuromuscular Re-education  4. Gait Training  5. Education     TREATMENT:     EVALUATION: Moderate Complexity : (Untimed Charge)    TREATMENT:   ($$ Therapeutic Activity: 8-22 mins    )  Therapeutic Activity (17 Minutes):  Therapeutic activity included Supine to Sit, Scooting, Transfer Training, Ambulation on level ground, Sitting balance  and Standing balance to improve functional Mobility, Strength, ROM and Activity tolerance.     TREATMENT GRID:  N/A    AFTER TREATMENT POSITION/PRECAUTIONS:  Chair, Needs within reach, RN notified and Visitors at bedside    INTERDISCIPLINARY COLLABORATION:  RN/PCT, PT/PTA and OT/HILLMAN    TOTAL TREATMENT DURATION:  PT Patient Time In/Time Out  Time In: 1030  Time Out: [address] [place] Alexand[place], PT

## 2021-11-29 NOTE — PROGRESS NOTES
VANCO DAILY FOLLOW UP NOTE  [address] Pharmacokinetic Monitoring Service - Vancomycin    Consulting Provider: Dr. Carlos Floor   Indication: SSTI  Target Concentration: Goal trough of 10-15 mg/L and AUC/NAFISA <500 mg*hr/L  Day of Therapy: 2  Additional Antimicrobials: Zosyn    Pertinent Laboratory Values: Wt Readings from Last 1 Encounters:   11/28/21 130.2 kg (287 lb)     Temp Readings from Last 1 Encounters:   11/29/21 98 °F (36.7 °C)     No components found for: PROCAL  Recent Labs     11/29/21 0413   CREA 1.11     Estimated Creatinine Clearance: 93.4 mL/min (based on SCr of 1.11 mg/dL). Lab Results   Component Value Date/Time    Vancomycin, random 12.4 11/29/2021 04:13 AM       MRSA Nasal Swab: N/A. Non-respiratory infection. .      Assessment:  Date/Time Dose Concentration AUC   11/29/2021 @0413 2000 mg 12.4 407   Note: Serum concentrations collected for AUC dosing may appear elevated if collected in close proximity to the dose administered, this is not necessarily an indication of toxicity    Plan:  Current dosing regimen is therapeutic  Continue current dose of 1500 mg IV q24h  Repeat vancomycin concentration ordered for 11/30/21 @ 0400   Pharmacy will continue to monitor patient and adjust therapy as indicated    Thank you for the consult,  Therese Escobar, Pharm D Candidate  Preceptor Rabia Cavanaugh [facility] SHAYAN

## 2021-11-30 LAB
ANION GAP SERPL CALC-SCNC: 6 MMOL/L (ref 7–16)
BACTERIA SPEC CULT: ABNORMAL
BASOPHILS # BLD: 0 K/UL (ref 0–0.2)
BASOPHILS NFR BLD: 0 % (ref 0–2)
BUN SERPL-MCNC: 34 MG/DL (ref 8–23)
CALCIUM SERPL-MCNC: 8.2 MG/DL (ref 8.3–10.4)
CHLORIDE SERPL-SCNC: 107 MMOL/L (ref 98–107)
CO2 SERPL-SCNC: 26 MMOL/L (ref 21–32)
CREAT SERPL-MCNC: 1.23 MG/DL (ref 0.8–1.5)
DIFFERENTIAL METHOD BLD: ABNORMAL
EOSINOPHIL # BLD: 0.2 K/UL (ref 0–0.8)
EOSINOPHIL NFR BLD: 3 % (ref 0.5–7.8)
ERYTHROCYTE [DISTWIDTH] IN BLOOD BY AUTOMATED COUNT: 14 % (ref 11.9–14.6)
GLUCOSE SERPL-MCNC: 108 MG/DL (ref 65–100)
GRAM STN SPEC: ABNORMAL
GRAM STN SPEC: ABNORMAL
HCT VFR BLD AUTO: 31.8 % (ref 41.1–50.3)
HGB BLD-MCNC: 10.1 G/DL (ref 13.6–17.2)
IMM GRANULOCYTES # BLD AUTO: 0.1 K/UL (ref 0–0.5)
IMM GRANULOCYTES NFR BLD AUTO: 2 % (ref 0–5)
LYMPHOCYTES # BLD: 1.7 K/UL (ref 0.5–4.6)
LYMPHOCYTES NFR BLD: 22 % (ref 13–44)
MCH RBC QN AUTO: 30.9 PG (ref 26.1–32.9)
MCHC RBC AUTO-ENTMCNC: 31.8 G/DL (ref 31.4–35)
MCV RBC AUTO: 97.2 FL (ref 79.6–97.8)
MONOCYTES # BLD: 0.6 K/UL (ref 0.1–1.3)
MONOCYTES NFR BLD: 8 % (ref 4–12)
NEUTS SEG # BLD: 5 K/UL (ref 1.7–8.2)
NEUTS SEG NFR BLD: 66 % (ref 43–78)
NRBC # BLD: 0 K/UL (ref 0–0.2)
PLATELET # BLD AUTO: 239 K/UL (ref 150–450)
PMV BLD AUTO: 10.8 FL (ref 9.4–12.3)
POTASSIUM SERPL-SCNC: 4 MMOL/L (ref 3.5–5.1)
RBC # BLD AUTO: 3.27 M/UL (ref 4.23–5.6)
SERVICE CMNT-IMP: ABNORMAL
SODIUM SERPL-SCNC: 139 MMOL/L (ref 138–145)
VANCOMYCIN SERPL-MCNC: 13.1 UG/ML
WBC # BLD AUTO: 7.6 K/UL (ref 4.3–11.1)

## 2021-11-30 PROCEDURE — 74011000258 HC RX REV CODE- 258: Performed by: NURSE PRACTITIONER

## 2021-11-30 PROCEDURE — C1751 CATH, INF, PER/CENT/MIDLINE: HCPCS

## 2021-11-30 PROCEDURE — 97530 THERAPEUTIC ACTIVITIES: CPT

## 2021-11-30 PROCEDURE — 36415 COLL VENOUS BLD VENIPUNCTURE: CPT

## 2021-11-30 PROCEDURE — 97110 THERAPEUTIC EXERCISES: CPT

## 2021-11-30 PROCEDURE — 74011250637 HC RX REV CODE- 250/637: Performed by: ORTHOPAEDIC SURGERY

## 2021-11-30 PROCEDURE — 36573 INSJ PICC RS&I 5 YR+: CPT | Performed by: NURSE PRACTITIONER

## 2021-11-30 PROCEDURE — 80048 BASIC METABOLIC PNL TOTAL CA: CPT

## 2021-11-30 PROCEDURE — 65270000029 HC RM PRIVATE

## 2021-11-30 PROCEDURE — 74011250637 HC RX REV CODE- 250/637: Performed by: NURSE PRACTITIONER

## 2021-11-30 PROCEDURE — 74011250636 HC RX REV CODE- 250/636: Performed by: NURSE PRACTITIONER

## 2021-11-30 PROCEDURE — 80202 ASSAY OF VANCOMYCIN: CPT

## 2021-11-30 PROCEDURE — 85025 COMPLETE CBC W/AUTO DIFF WBC: CPT

## 2021-11-30 RX ORDER — SODIUM CHLORIDE 0.9 % (FLUSH) 0.9 %
10 SYRINGE (ML) INJECTION EVERY 8 HOURS
Status: DISCONTINUED | OUTPATIENT
Start: 2021-11-30 | End: 2021-12-02 | Stop reason: HOSPADM

## 2021-11-30 RX ORDER — SODIUM CHLORIDE 0.9 % (FLUSH) 0.9 %
10 SYRINGE (ML) INJECTION AS NEEDED
Status: DISCONTINUED | OUTPATIENT
Start: 2021-11-30 | End: 2021-12-02 | Stop reason: HOSPADM

## 2021-11-30 RX ADMIN — ALLOPURINOL 300 MG: 300 TABLET ORAL at 17:31

## 2021-11-30 RX ADMIN — CELECOXIB 200 MG: 100 CAPSULE ORAL at 17:31

## 2021-11-30 RX ADMIN — HYDROMORPHONE HYDROCHLORIDE 2 MG: 2 TABLET ORAL at 05:50

## 2021-11-30 RX ADMIN — CEFTRIAXONE 2 G: 2 INJECTION, POWDER, FOR SOLUTION INTRAMUSCULAR; INTRAVENOUS at 11:56

## 2021-11-30 RX ADMIN — Medication 10 ML: at 22:01

## 2021-11-30 RX ADMIN — PANTOPRAZOLE SODIUM 40 MG: 40 TABLET, DELAYED RELEASE ORAL at 17:31

## 2021-11-30 RX ADMIN — SENNOSIDES AND DOCUSATE SODIUM 2 TABLET: 8.6; 5 TABLET ORAL at 08:39

## 2021-11-30 RX ADMIN — RIFAMPIN 300 MG: 300 CAPSULE ORAL at 17:31

## 2021-11-30 RX ADMIN — ACETAMINOPHEN 1000 MG: 500 TABLET ORAL at 17:31

## 2021-11-30 RX ADMIN — PRAZOSIN HYDROCHLORIDE 4 MG: 1 CAPSULE ORAL at 22:01

## 2021-11-30 RX ADMIN — OXYCODONE 10 MG: 5 TABLET ORAL at 15:07

## 2021-11-30 RX ADMIN — FLUOXETINE 60 MG: 20 CAPSULE ORAL at 08:39

## 2021-11-30 RX ADMIN — HYDROMORPHONE HYDROCHLORIDE 2 MG: 2 TABLET ORAL at 11:56

## 2021-11-30 RX ADMIN — RIFAMPIN 300 MG: 300 CAPSULE ORAL at 05:50

## 2021-11-30 RX ADMIN — ACETAMINOPHEN 1000 MG: 500 TABLET ORAL at 11:56

## 2021-11-30 RX ADMIN — ALLOPURINOL 300 MG: 300 TABLET ORAL at 08:38

## 2021-11-30 RX ADMIN — CHLORTHALIDONE 25 MG: 25 TABLET ORAL at 08:38

## 2021-11-30 RX ADMIN — HYDROMORPHONE HYDROCHLORIDE 2 MG: 2 TABLET ORAL at 00:09

## 2021-11-30 RX ADMIN — ZOLPIDEM TARTRATE 10 MG: 5 TABLET ORAL at 22:01

## 2021-11-30 RX ADMIN — HYDROMORPHONE HYDROCHLORIDE 2 MG: 2 TABLET ORAL at 17:41

## 2021-11-30 RX ADMIN — Medication 10 ML: at 17:32

## 2021-11-30 RX ADMIN — Medication 10 ML: at 15:07

## 2021-11-30 RX ADMIN — Medication 1 AMPULE: at 08:39

## 2021-11-30 RX ADMIN — OXYCODONE 10 MG: 5 TABLET ORAL at 03:26

## 2021-11-30 RX ADMIN — OXYCODONE 10 MG: 5 TABLET ORAL at 22:01

## 2021-11-30 RX ADMIN — OXYCODONE 10 MG: 5 TABLET ORAL at 08:45

## 2021-11-30 RX ADMIN — Medication 1 AMPULE: at 22:01

## 2021-11-30 RX ADMIN — PANTOPRAZOLE SODIUM 40 MG: 40 TABLET, DELAYED RELEASE ORAL at 08:38

## 2021-11-30 RX ADMIN — Medication 10 ML: at 05:50

## 2021-11-30 RX ADMIN — ATORVASTATIN CALCIUM 40 MG: 40 TABLET, FILM COATED ORAL at 22:01

## 2021-11-30 RX ADMIN — CELECOXIB 200 MG: 100 CAPSULE ORAL at 05:50

## 2021-11-30 RX ADMIN — ASPIRIN 81 MG: 81 TABLET ORAL at 22:01

## 2021-11-30 RX ADMIN — ASPIRIN 81 MG: 81 TABLET ORAL at 08:39

## 2021-11-30 RX ADMIN — ACETAMINOPHEN 1000 MG: 500 TABLET ORAL at 05:50

## 2021-11-30 NOTE — PROGRESS NOTES
Infectious Disease Note    Today's Date: 11/30/2021   Admit Date: 11/27/2021    Impression:   · MSSA left TKA infection  -Status post washout with polyexchange 11/28  -L TKA placement 10/29/21    Plan:   · Change to Rocephin 2g IV q 24h for 6 weeks eot 1/10/21. Plan to transition to cefadroxil for suppression thereafter. · Continue Rifampin 300mg oral BID for at least 6 weeks. · Place PICC  · Dispo: home soon. First option is for pt to self-infuse at home--we will run costs via insurance and see if this is affordable. Second option is for [facility] infusion center visits. Anti-infectives:   · Vancomycin 11/27-  ·  Zosyn 11/27-11/29    Subjective:   Afebrile, spoke with his wife on speaker phone about abx options. I also spoke with CM about dispo plans. Patient is a 64 y.o. male with medical history for osteoarthritis status post right TKA 3/2021, gout, CKD 2, morbid obesity who was admitted on November 27 for acute onset left knee pain. Patient recently underwent left TKA on October 29. Patient reports that he did well until Wednesday-November 24. Last week patient went on a short trip to [facility] and walked around than usual.  He also soaked in a hot tub. He noticed on November 24 that his right knee began to swell and became very painful. Dr. Rhianna Dewey called in an antibiotic on Friday--patient cannot remember the name this did take a few doses. Patient symptoms continued and he presented for admission November 27. Dr. Rhianna Dewey took him to the OR on November 28 for a washout with a polyexchange. Operative note reveals purulence to knee joint and prepatellar bursa. Operative culture with staph aureus. Postoperatively started on vancomycin and Zosyn. His wife was present. Denies fever, chills, sweats. No Active Allergies     Review of Systems:  A comprehensive review of systems was negative except for that written in the History of Present Illness.     Objective:     Visit Vitals  /86 (BP 1 Location: Left upper arm, BP Patient Position: At rest)   Pulse 70   Temp 97.4 °F (36.3 °C)   Resp 18   Ht 5' 9\" (1.753 m)   Wt 130.2 kg (287 lb)   SpO2 95%   BMI 42.38 kg/m²     Temp (24hrs), Av.6 °F (36.4 °C), Min:97.2 °F (36.2 °C), Max:98.3 °F (36.8 °C)       Lines:  Peripheral IV:       Physical Exam:    General:  Alert, cooperative, well noursished, well developed, appears stated age   Eyes:  Sclera anicteric. Pupils equally round and reactive to light. Mouth/Throat: Mucous membranes normal, oral pharynx clear   Neck: Supple   Lungs:   Clear to auscultation bilaterally, good effort   CV:  Regular rate and rhythm,no murmur, click, rub or gallop   Abdomen:   Soft, non-tender. bowel sounds normal. non-distended   Extremities: No cyanosis or edema   Skin: Skin color, texture, turgor normal. no acute rash or lesions   Lymph nodes: Cervical and supraclavicular normal   Musculoskeletal:  L Knee in postop wrap   Lines/Devices:  Intact, no erythema, drainage or tenderness   Psych: Alert and oriented, normal mood affect given the setting       Data Review:     CBC:  Recent Labs     21  0631 21  1142 21  0413   WBC 7.6 9.9  --    GRANS 66 85*  --    MONOS 8 6  --    EOS 3 0*  --    ANEU 5.0 8.3*  --    ABL 1.7 0.9  --    HGB 10.1* 10.3* 10.3*   HCT 31.8* 32.6*  --     222  --        BMP:  Recent Labs     21  0631 21  1214 21  0413   CREA 1.23 1.38 1.11   BUN 34* 29*  --     137*  --    K 4.0 4.2  --     104  --    CO2 26 27  --    AGAP 6* 6*  --    * 100  --        LFTS:  Recent Labs     21  0951   TBILI 0.5   ALT 24   AP 81   TP 6.6   ALB 2.1*       Microbiology:     All Micro Results     Procedure Component Value Units Date/Time    CULTURE, BODY FLUID Yulisa Hane STAIN [[id number]]  (Abnormal)  (Susceptibility) Collected: 21 0900    Order Status: Completed Specimen:  Body Fluid from Joint Fluid Updated: 21 6702     Special Requests: PATIENT HAD A DOSE OF VANCOMYCIN ON 11/28/21     GRAM STAIN 40 TO 80  WBCS/OIF            MODERATE GRAM POSITIVE COCCI           Culture result:       HEAVY STAPHYLOCOCCUS AUREUS          COVID-19 RAPID TEST [[id number]] Collected: 11/28/21 0930    Order Status: Completed Specimen: Nasopharyngeal Updated: 11/28/21 1047     Specimen source Nasopharyngeal        COVID-19 rapid test Not detected        Comment:      The specimen is NEGATIVE for SARS-CoV-2, the novel coronavirus associated with COVID-19. A negative result does not rule out COVID-19. This test has been authorized by the FDA under an Emergency Use Authorization (EUA) for use by authorized laboratories. Fact sheet for Healthcare Providers: iTendency.uy  Fact sheet for Patients: iTendency.uy       Methodology: Isothermal Nucleic Acid Amplification         CULTURE, BODY FLUID Myrle Loach STAIN [[id number]]     Order Status: Canceled Specimen:  Body Fluid from Joint Fluid           Imaging:   See HPI/EPIC     Signed By: Mayra Viveros NP     November 30, 2021

## 2021-11-30 NOTE — PROGRESS NOTES
Problem: Falls - Risk of  Goal: *Absence of Falls  Description: Document Beck Sol Fall Risk and appropriate interventions in the flowsheet. Outcome: Progressing Towards Goal  Note: Fall Risk Interventions:  Mobility Interventions: Bed/chair exit alarm, Communicate number of staff needed for ambulation/transfer, Patient to call before getting OOB         Medication Interventions: Bed/chair exit alarm, Patient to call before getting OOB, Teach patient to arise slowly    Elimination Interventions: Bed/chair exit alarm, Call light in reach, Patient to call for help with toileting needs, Stay With Me (per policy)    History of Falls Interventions: Door open when patient unattended         Problem: Patient Education: Go to Patient Education Activity  Goal: Patient/Family Education  Outcome: Progressing Towards Goal     Problem: Pressure Injury - Risk of  Goal: *Prevention of pressure injury  Description: Document Tony Scale and appropriate interventions in the flowsheet.   Outcome: Progressing Towards Goal  Note: Pressure Injury Interventions:  Sensory Interventions: Assess changes in LOC, Avoid rigorous massage over bony prominences    Moisture Interventions: Absorbent underpads, Check for incontinence Q2 hours and as needed    Activity Interventions: Increase time out of bed, Pressure redistribution bed/mattress(bed type), PT/OT evaluation    Mobility Interventions: HOB 30 degrees or less, Pressure redistribution bed/mattress (bed type), PT/OT evaluation    Nutrition Interventions: Document food/fluid/supplement intake, Offer support with meals,snacks and hydration    Friction and Shear Interventions: HOB 30 degrees or less                Problem: Patient Education: Go to Patient Education Activity  Goal: Patient/Family Education  Outcome: Progressing Towards Goal     Problem: Patient Education: Go to Patient Education Activity  Goal: Patient/Family Education  Outcome: Progressing Towards Goal     Problem: Patient Education: Go to Patient Education Activity  Goal: Patient/Family Education  Outcome: Progressing Towards Goal

## 2021-11-30 NOTE — PROGRESS NOTES
PICC Placement Note    PRE-PROCEDURE VERIFICATION  Correct Procedure: yes. Time out completed with assistant Ligia Michael rn and all persons present in agreement with time out. Correct Site:  yes  Temperature: Temp: 98.1 °F (36.7 °C), Temperature Source: Temp Source: Oral  Recent Labs     11/30/21  0631   BUN 34*   CREA 1.23      WBC 7.6     Allergies: Patient has no active allergies. Education materials for [facility] given to patient or family. PROCEDURE DETAIL  A single lumen PICC line was started for antibiotic therapy. The following documentation is in addition to the PICC properties in the lines/airways flowsheet :  Lot #: [id number]  xylocaine used: yes  Mid-Arm Circumference: 39.5 (cm)  Internal Catheter Length: 45 (cm)  Internal Catheter Total Length: 45 (cm)  Vein Selection for PICC:right basilic  Central Line Bundle followed yes  Complication Related to Insertion: none  Both the insertion guidewire and ECG guidewire were removed intact all ports have positive blood return and were flush well with normal saline. The location of the tip of the PICC is verified using ECG technology. The tip is in the SVC per ECG reading. See image below.               Line is okay to use: yes

## 2021-11-30 NOTE — PROGRESS NOTES
Report given to oncoming RN. Pt resting in bed but still having a lot of pain. Oncoming RN to give pain med.

## 2021-11-30 NOTE — PROGRESS NOTES
[facility] TC to [facility] in their intake to alert her to the referral.  She will process the referral and notify SW of pricing.

## 2021-11-30 NOTE — PROGRESS NOTES
Orthopedic Joint Progress Note    2021  Admit Date: 2021  Admit Diagnosis: Left knee pain [M25.562]    2 Days Post-Op    Subjective:     Shanika Mitchell. s/p I and D for infected TKA    Review of Systems: Pertinent items are noted in HPI. Objective:     PT/OT:     PATIENT MOBILITY                           Vital Signs:    Blood pressure 120/86, pulse 70, temperature 97.4 °F (36.3 °C), resp. rate 18, height 5' 9\" (1.753 m), weight 130.2 kg (287 lb), SpO2 95 %.   Temp (24hrs), Av.6 °F (36.4 °C), Min:97.2 °F (36.2 °C), Max:98.3 °F (36.8 °C)      Pain Control:   Pain Assessment  Pain Scale 1: Numeric (0 - 10)  Pain Intensity 1: 0  Pain Onset 1: postop  Pain Location 1: Knee  Pain Orientation 1: Left  Pain Description 1: Aching  Pain Intervention(s) 1: Medication (see MAR)    Meds:  Current Facility-Administered Medications   Medication Dose Route Frequency    influenza vaccine  (6 mos+)(PF) (FLUARIX/FLULAVAL/FLUZONE QUAD) injection 0.5 mL  1 Each IntraMUSCular PRIOR TO DISCHARGE    rifAMPin (RIFADIN) capsule 300 mg  300 mg Oral Q12H    allopurinoL (ZYLOPRIM) tablet 300 mg  300 mg Oral BID    atorvastatin (LIPITOR) tablet 40 mg  40 mg Oral DAILY    chlorthalidone (HYGROTON) tablet 25 mg  25 mg Oral DAILY    FLUoxetine (PROzac) capsule 60 mg  60 mg Oral DAILY    pantoprazole (PROTONIX) tablet 40 mg  40 mg Oral BID    prazosin (MINIPRESS) capsule 4 mg  4 mg Oral QHS    zolpidem (AMBIEN) tablet 10 mg  10 mg Oral QHS PRN    alcohol 62% (NOZIN) nasal  1 Ampule  1 Ampule Topical Q12H    sodium chloride (NS) flush 5-40 mL  5-40 mL IntraVENous Q8H    sodium chloride (NS) flush 5-40 mL  5-40 mL IntraVENous PRN    acetaminophen (TYLENOL) tablet 1,000 mg  1,000 mg Oral Q6H    celecoxib (CELEBREX) capsule 200 mg  200 mg Oral Q12H    oxyCODONE IR (ROXICODONE) tablet 10 mg  10 mg Oral Q4H PRN    naloxone (NARCAN) injection 0.2-0.4 mg  0.2-0.4 mg IntraVENous Q10MIN PRN  diphenhydrAMINE (BENADRYL) capsule 25 mg  25 mg Oral Q4H PRN    senna-docusate (PERICOLACE) 8.6-50 mg per tablet 2 Tablet  2 Tablet Oral DAILY    morphine injection 2 mg  2 mg IntraVENous Q3H PRN    HYDROmorphone (DILAUDID) tablet 2 mg  2 mg Oral Q4H PRN    HYDROmorphone (DILAUDID) injection 2 mg  2 mg IntraVENous Q4H PRN    aspirin delayed-release tablet 81 mg  81 mg Oral Q12H    vancomycin (VANCOCIN) 1500 mg in  ml infusion  1,500 mg IntraVENous Q24H        LAB:    Lab Results   Component Value Date/Time    INR 1.0 09/03/2019 09:42 AM     Lab Results   Component Value Date/Time    HGB 10.1 (L) 11/30/2021 06:31 AM    HGB 10.3 (L) 11/29/2021 11:42 AM    HGB 10.3 (L) 11/29/2021 04:13 AM       Wound Knee Anterior;  Left Surgical site 11/27/21 (Active)   Dressing Status Clean; Dry; Intact 11/30/21 0832   Dressing/Treatment Open to air 11/27/21 2320   Drainage Amount Moderate 11/27/21 2320   Drainage Description Purulent; Serous 11/27/21 2320   Wound Odor None 11/27/21 2320   Number of days:        Incision 11/28/21 Leg Left (Active)   Number of days: 2       [REMOVED] Incision 10/29/21 Knee Left (Removed)   Number of days: 20         Physical Exam:  No significant changes, no drainage and drains intact    Assessment:      Principal Problem:    [facility] (11/28/2021)    Active Problems:    HLD (hyperlipidemia) (11/14/2012)      HTN (hypertension) (11/14/2012)      TRINA on CPAP (11/14/2012)      Gout (11/14/2012)      GERD (gastroesophageal reflux disease) (5/14/2013)      Obesity, Class III, BMI 40-49.9 (morbid obesity) ([facility] (8/27/2019)      Acute pain of left knee (7/15/2021)      Stage 3 chronic kidney disease ([facility] (7/15/2021)      Left knee pain (11/27/2021)         Plan:change dressing tomorrow, possible drain removal      Continue PT/OT/Rehab  Consult: PT, ID, hospitalists    Patient Expects to be Discharged to[facility]de-identified] [facility]

## 2021-11-30 NOTE — PROGRESS NOTES
ACUTE PHYSICAL THERAPY GOALS:  (Developed with and agreed upon by patient and/or caregiver.)  (1.) Shi Chang will move from supine to sit and sit to supine , scoot up and down and roll side to side with CONTACT GUARD ASSIST within 7 treatment day(s). (2.) Shi Chang will transfer from bed to chair and chair to bed with CONTACT GUARD ASSIST using the least restrictive device within 7 treatment day(s). (3.) Shi Chang will ambulate with CONTACT GUARD ASSIST for 200+ feet with the least restrictive device within 7 treatment day(s). (4.) Shi Rudolph. will perform standing static and dynamic balance activities x 15 minutes with CONTACT GUARD ASSIST to improve safety within 7 treatment day(s). (5.) Shi Chang will ascend and descend 2 stairs using one hand rail(s) with CONTACT GUARD ASSIST to improve functional mobility and safety within 7 treatment day(s). (6.) Shi Chang will perform therapeutic exercises x 15 min for HEP with INDEPENDENCE to improve strength, endurance, and functional mobility within 7 treatment day(s). PHYSICAL THERAPY: Daily Note and PM Treatment Day # 2   WBAT LLE      Shi Chang is a 64 y.o. male   PRIMARY DIAGNOSIS: Septic arthritis of knee, left (HCC)  Left knee pain [M25.562]  Procedure(s) (LRB):  EXTREMITY IRRIGATION AND DEBRIDEMENT LEFT KNEE / ARTICULAR INSERT REVISION (Left)  2 Days Post-Op    ASSESSMENT:     REHAB RECOMMENDATIONS: CURRENT LEVEL OF FUNCTION:  (Most Recently Demonstrated)   Recommendation to date pending progress:  [address] [place] [address]  Equipment:    Rolling Walker Bed Mobility:   Minimal Assistance  Sit to Stand:   Minimal Assistance  Transfers:   Minimal Assistance  Gait/Mobility:   Contact Guard Assistance- min A     ASSESSMENT:  Mr. Jose Francisco Allen is making slow progress toward goals with increased gait distances and improved mobiltiy.   He continues to be limited by high levels of pain, but is motivated to participate. Ambulation with RW and chair follow initially with good gait, but as fatigued noted increased antalgic limping. After rest, good participation with seated exercises. SUBJECTIVE:   Mr. Tomas Powers states, \"It feels like its pinching. \"    SOCIAL HISTORY/ LIVING ENVIRONMENT: see eval  Home Environment: Trailer/mobile home  One/Two Story Residence: One story  Living Alone: No  Support Systems: Spouse/Significant Other  OBJECTIVE:     PAIN: VITAL SIGNS: LINES/DRAINS:   Pre Treatment: Pain Screen  Pain Scale 1: Numeric (0 - 10)  Pain Intensity 1: 4  Post Treatment: 6 RN aware   LIAM Drain  O2 Device: None (Room air)     MOBILITY: I Mod I S SBA CGA Min Mod Max Total  NT x2 Comments:   Bed Mobility    Rolling [] [] [] [] [x] [] [] [] [] [] []    Supine to Sit [] [] [] [] [] [x] [] [] [] [] []    Scooting [] [] [] [] [] [x] [] [] [] [] []    Sit to Supine [] [] [] [] [] [] [] [] [] [x] []    Transfers    Sit to Stand [] [] [] [] [] [x] [] [] [] [] []    Bed to Chair [] [] [] [] [] [x] [] [] [] [] []    Stand to Sit [] [] [] [] [] [x] [] [] [] [] []    I=Independent, Mod I=Modified Independent, S=Supervision, SBA=Standby Assistance, CGA=Contact Guard Assistance,   Min=Minimal Assistance, Mod=Moderate Assistance, Max=Maximal Assistance, Total=Total Assistance, NT=Not Tested    BALANCE: Good Fair+ Fair Fair- Poor NT Comments   Sitting Static [x] [] [] [] [] []    Sitting Dynamic [x] [x] [] [] [] []              Standing Static [] [] [x] [] [] []    Standing Dynamic [] [] [x] [] [] []      GAIT: I Mod I S SBA CGA Min Mod Max Total  NT x2 Comments:   Level of Assistance [] [] [] [] [] [x] [] [] [] [] []    Distance 15    DME Rolling Walker and chair follow    Gait Quality Antalgic, decreased pace    Weightbearing  Status WBAT     I=Independent, Mod I=Modified Independent, S=Supervision, SBA=Standby Assistance, CGA=Contact Guard Assistance,   Min=Minimal Assistance, Mod=Moderate Assistance, Max=Maximal Assistance, Total=Total Assistance, NT=Not Tested    PLAN:   FREQUENCY/DURATION: PT Plan of Care: Daily for duration of hospital stay or until stated goals are met, whichever comes first.  TREATMENT:     TREATMENT:   ($$ Therapeutic Activity: 8-22 mins  $$ Therapeutic Exercises: 8-22 mins    )  Therapeutic Activity (20 Minutes): Therapeutic activity included Supine to Sit, Scooting, Transfer Training, Ambulation on level ground, Sitting balance  and Standing balance to improve functional Mobility, Strength and Activity tolerance. Therapeutic Exercise (12 Minutes): Therapeutic exercises noted below to improve functional activity tolerance, AROM, strength and mobility.      TREATMENT GRID:   Date:  11/30 Date:   Date:     Activity/Exercise Parameters Parameters Parameters   Ankle pumps x15     abduction x10 AAL     Quad sets x10 B     Glut sets x10     IR/ER x10     SLR x10 AAL                 AFTER TREATMENT POSITION/PRECAUTIONS:  Chair, Needs within reach and RN notified    INTERDISCIPLINARY COLLABORATION:  RN/PCT and PT/PTA    TOTAL TREATMENT DURATION:  PT Patient Time In/Time Out  Time In: 1449  Time Out: 730 [facility]

## 2021-12-01 ENCOUNTER — HOME HEALTH ADMISSION (OUTPATIENT)
Dept: HOME HEALTH SERVICES | Facility: HOME HEALTH | Age: 61
End: 2021-12-01
Payer: MEDICARE

## 2021-12-01 PROCEDURE — 74011250637 HC RX REV CODE- 250/637: Performed by: ORTHOPAEDIC SURGERY

## 2021-12-01 PROCEDURE — 97110 THERAPEUTIC EXERCISES: CPT

## 2021-12-01 PROCEDURE — 65270000029 HC RM PRIVATE

## 2021-12-01 PROCEDURE — 97530 THERAPEUTIC ACTIVITIES: CPT

## 2021-12-01 PROCEDURE — 97535 SELF CARE MNGMENT TRAINING: CPT

## 2021-12-01 PROCEDURE — 97116 GAIT TRAINING THERAPY: CPT

## 2021-12-01 PROCEDURE — 74011000258 HC RX REV CODE- 258: Performed by: NURSE PRACTITIONER

## 2021-12-01 PROCEDURE — 74011250637 HC RX REV CODE- 250/637: Performed by: NURSE PRACTITIONER

## 2021-12-01 PROCEDURE — 74011250636 HC RX REV CODE- 250/636: Performed by: NURSE PRACTITIONER

## 2021-12-01 RX ADMIN — HYDROMORPHONE HYDROCHLORIDE 2 MG: 2 TABLET ORAL at 15:02

## 2021-12-01 RX ADMIN — PRAZOSIN HYDROCHLORIDE 4 MG: 1 CAPSULE ORAL at 21:11

## 2021-12-01 RX ADMIN — Medication 10 ML: at 06:04

## 2021-12-01 RX ADMIN — ALLOPURINOL 300 MG: 300 TABLET ORAL at 17:45

## 2021-12-01 RX ADMIN — ACETAMINOPHEN 1000 MG: 500 TABLET ORAL at 12:01

## 2021-12-01 RX ADMIN — RIFAMPIN 300 MG: 300 CAPSULE ORAL at 06:03

## 2021-12-01 RX ADMIN — FLUOXETINE 60 MG: 20 CAPSULE ORAL at 06:22

## 2021-12-01 RX ADMIN — ASPIRIN 81 MG: 81 TABLET ORAL at 08:45

## 2021-12-01 RX ADMIN — CELECOXIB 200 MG: 100 CAPSULE ORAL at 17:45

## 2021-12-01 RX ADMIN — OXYCODONE 10 MG: 5 TABLET ORAL at 06:03

## 2021-12-01 RX ADMIN — Medication 10 ML: at 13:35

## 2021-12-01 RX ADMIN — CEFTRIAXONE 2 G: 2 INJECTION, POWDER, FOR SOLUTION INTRAMUSCULAR; INTRAVENOUS at 12:01

## 2021-12-01 RX ADMIN — PANTOPRAZOLE SODIUM 40 MG: 40 TABLET, DELAYED RELEASE ORAL at 17:45

## 2021-12-01 RX ADMIN — ZOLPIDEM TARTRATE 10 MG: 5 TABLET ORAL at 21:11

## 2021-12-01 RX ADMIN — ASPIRIN 81 MG: 81 TABLET ORAL at 21:11

## 2021-12-01 RX ADMIN — HYDROMORPHONE HYDROCHLORIDE 2 MG: 2 TABLET ORAL at 08:45

## 2021-12-01 RX ADMIN — CELECOXIB 200 MG: 100 CAPSULE ORAL at 06:03

## 2021-12-01 RX ADMIN — OXYCODONE 10 MG: 5 TABLET ORAL at 12:01

## 2021-12-01 RX ADMIN — ATORVASTATIN CALCIUM 40 MG: 40 TABLET, FILM COATED ORAL at 21:11

## 2021-12-01 RX ADMIN — CHLORTHALIDONE 25 MG: 25 TABLET ORAL at 08:45

## 2021-12-01 RX ADMIN — ACETAMINOPHEN 1000 MG: 500 TABLET ORAL at 06:03

## 2021-12-01 RX ADMIN — OXYCODONE 10 MG: 5 TABLET ORAL at 17:45

## 2021-12-01 RX ADMIN — PANTOPRAZOLE SODIUM 40 MG: 40 TABLET, DELAYED RELEASE ORAL at 06:22

## 2021-12-01 RX ADMIN — RIFAMPIN 300 MG: 300 CAPSULE ORAL at 17:45

## 2021-12-01 RX ADMIN — ALLOPURINOL 300 MG: 300 TABLET ORAL at 08:45

## 2021-12-01 RX ADMIN — OXYCODONE 10 MG: 5 TABLET ORAL at 21:10

## 2021-12-01 RX ADMIN — OXYCODONE 10 MG: 5 TABLET ORAL at 01:37

## 2021-12-01 RX ADMIN — Medication 10 ML: at 21:11

## 2021-12-01 RX ADMIN — ACETAMINOPHEN 1000 MG: 500 TABLET ORAL at 17:45

## 2021-12-01 NOTE — PROGRESS NOTES
ACUTE PHYSICAL THERAPY GOALS:  (Developed with and agreed upon by patient and/or caregiver.)  (1.) Edmonds Carrow. will move from supine to sit and sit to supine , scoot up and down and roll side to side with CONTACT GUARD ASSIST within 7 treatment day(s). (2.) Edmonds Carrow. will transfer from bed to chair and chair to bed with CONTACT GUARD ASSIST using the least restrictive device within 7 treatment day(s). (3.) Edmonds Carrow. will ambulate with CONTACT GUARD ASSIST for 200+ feet with the least restrictive device within 7 treatment day(s). (4.) Edmonds Carrow. will perform standing static and dynamic balance activities x 15 minutes with CONTACT GUARD ASSIST to improve safety within 7 treatment day(s). GOAL MET: 21  (5.) Edmonds Carrow. will ascend and descend 2 stairs using one hand rail(s) with CONTACT GUARD ASSIST to improve functional mobility and safety within 7 treatment day(s). (6.) Edmonds Carrow. will perform therapeutic exercises x 15 min for HEP with INDEPENDENCE to improve strength, endurance, and functional mobility within 7 treatment day(s). GOAL MET: 21      PHYSICAL THERAPY: Daily Note and PM Treatment Day # 3   WBAT LLE      Grey Negro. is a 64 y.o. male   PRIMARY DIAGNOSIS: Septic arthritis of knee, left (HCC)  Left knee pain [M25.562]  Procedure(s) (LRB):  EXTREMITY IRRIGATION AND DEBRIDEMENT LEFT KNEE / ARTICULAR INSERT REVISION (Left)  3 Days Post-Op    ASSESSMENT:     REHAB RECOMMENDATIONS: CURRENT LEVEL OF FUNCTION:  (Most Recently Demonstrated)   Recommendation to date pending progress:  [address] [place] [address]  Equipment:    Rolling Walker Bed Mobility:   Minimal Assistance  Sit to Stand:   Contact Guard Assistance  Transfers:   Contact Guard Assistance  Gait/Mobility:   Contact Guard Assistance     ASSESSMENT:  Mr. Bobby Burrows continues to make good progress toward goals.   Exercises in the chair for joint ROM and strength. He then ambulated 80' w/ RW and Chair follow, cueing for posture and gait technique. Back to the chair post treatment.        SUBJECTIVE:   Mr. Jose Francisco Allen states, \"It hurts, but it's what I've got to do\"    SOCIAL HISTORY/ LIVING ENVIRONMENT: see eval  Home Environment: Trailer/mobile home  One/Two Story Residence: One story  Living Alone: No  Support Systems: Spouse/Significant Other  OBJECTIVE:     PAIN: VITAL SIGNS: LINES/DRAINS:   Pre Treatment: Pain Screen  Pain Scale 1: Numeric (0 - 10)  Pain Intensity 1: 3  Post Treatment: 4   LIAM Drain  O2 Device: None (Room air)     MOBILITY: I Mod I S SBA CGA Min Mod Max Total  NT x2 Comments:   Bed Mobility    Rolling [] [] [] [] [] [] [] [] [] [x] []    Supine to Sit [] [] [] [] [] [] [] [] [] [x] []    Scooting [] [] [] [] [] [] [] [] [] [x] []    Sit to Supine [] [] [] [] [] [] [] [] [] [x] []    Transfers    Sit to Stand [] [] [] [] [x] [] [] [] [] [] []    Bed to Chair [] [] [] [] [x] [] [] [] [] [] []    Stand to Sit [] [] [] [] [x] [] [] [] [] [] []    I=Independent, Mod I=Modified Independent, S=Supervision, SBA=Standby Assistance, CGA=Contact Guard Assistance,   Min=Minimal Assistance, Mod=Moderate Assistance, Max=Maximal Assistance, Total=Total Assistance, NT=Not Tested    BALANCE: Good Fair+ Fair Fair- Poor NT Comments   Sitting Static [x] [] [] [] [] []    Sitting Dynamic [x] [x] [] [] [] []              Standing Static [] [x] [x] [] [] []    Standing Dynamic [] [] [x] [] [] []      GAIT: I Mod I S SBA CGA Min Mod Max Total  NT x2 Comments:   Level of Assistance [] [] [] [] [x] [] [] [] [] [] []    Distance 80'    DME Rolling Walker and chair follow    Gait Quality Antalgic, decreased pace    Weightbearing  Status WBAT     I=Independent, Mod I=Modified Independent, S=Supervision, SBA=Standby Assistance, CGA=Contact Guard Assistance,   Min=Minimal Assistance, Mod=Moderate Assistance, Max=Maximal Assistance, Total=Total Assistance, NT=Not Tested    PLAN:   FREQUENCY/DURATION: PT Plan of Care: Daily for duration of hospital stay or until stated goals are met, whichever comes first.  TREATMENT:     TREATMENT:   ($$ Gait Trainin-22 mins  $$ Therapeutic Exercises: 8-22 mins    )  Therapeutic Activity (14 Minutes): Therapeutic activity included Scooting, Transfer Training, Ambulation on level ground, Sitting balance  and Standing balance to improve functional Mobility, Strength and Activity tolerance. Therapeutic Exercise (10 Minutes): Therapeutic exercises noted below to improve functional activity tolerance, AROM, strength and mobility.      TREATMENT GRID:   Date:   Date:   Date:     Activity/Exercise Parameters Parameters Parameters   Ankle pumps x15 x10    abduction x10 AAL     Quad sets x10 B     Glut sets x10     IR/ER x10     SLR x10 AAL x5    LAQ  x10    marching  x10    Knee flexion  x10          AFTER TREATMENT POSITION/PRECAUTIONS:  Chair, Needs within reach and RN notified    INTERDISCIPLINARY COLLABORATION:  RN/PCT and PT/PTA    TOTAL TREATMENT DURATION:  PT Patient Time In/Time Out  Time In: 1436  Time Out: 4100 [facility]

## 2021-12-01 NOTE — PROGRESS NOTES
ACUTE OT GOALS:  (Developed with and agreed upon by patient and/or caregiver.)  1. Patient will complete total body bathing and dressing with SBA and adaptive equipment as needed. 2. Patient will complete toileting with SBA. 3. Patient will complete grooming ADL standing at sink with SUPERVISION. 4. Patient will tolerate 25 minutes of OT treatment with 1-2 rest breaks to increase activity tolerance for ADLs. 5. Patient will complete functional transfers with SUPERVISION and adaptive equipment as needed. 6. Patient will tolerate 10 minutes BUE exercises to increase strength for safe, functional transfers.      Timeframe: 7 visits   WBAT LLE  OCCUPATIONAL THERAPY: Daily Note OT Treatment Day # 2    Han Day is a 64 y.o. male   PRIMARY DIAGNOSIS: Septic arthritis of knee, left (HCC)  Left knee pain [M25.562]  Procedure(s) (LRB):  EXTREMITY IRRIGATION AND DEBRIDEMENT LEFT KNEE / ARTICULAR INSERT REVISION (Left)  3 Days Post-Op  Payor: [facility] MEDICARE COMPLETE / Plan: [employer] / Product Type: [employer] Care Medicare /   ASSESSMENT:     REHAB RECOMMENDATIONS: CURRENT LEVEL OF FUNCTION:  (Most Recently Demonstrated)   Recommendation to date pending progress:  [address] [place] [address] Therapy  Equipment:    None pt has RW and SC Bathing:   Not tested  Dressing:   Not tested  Feeding/Grooming:   Contact Guard Assistance  Toileting:   Not tested  Functional Mobility:   Contact Guard Assistance     ASSESSMENT:  Mr. Clare Alva is a 65 y/o male presents s/p I & D of LLE after having L TKA (10/29/21) and getting an infection from a hot tub post op. Pt is WBAT LLE. Today pt presents with decreased activity tolerance, mobility, balance and increased pain impacting ADLs. Pt has made significant improvements since initial evaluation now requiring CGA RW for functional mobility of household distances and transfers. Pt completed grooming ADLs standing at sink with CGA RW.  Pt required 1 seated RB in between standing ADLs and functional mobility (x2 laps in room). Pt increased activity tolerance although noticeably SOB after activity. Pt is progressing well toward goals. Continue POC. SUBJECTIVE:   Mr. Tomas Powers states, \"I just wish I could walk farther. \"    SOCIAL HISTORY/LIVING ENVIRONMENT: lives with wife, one level home, 2 RYAN, walk in shower w/ SC, has RW from L TKA, has been using RW at home since TKA otherwise independent, drives  Home Environment: Trailer/mobile home  Home Environment: Trailer/mobile home  One/Two Story Residence: One story  Living Alone: No  Support Systems: Spouse/Significant Other    OBJECTIVE:     PAIN: VITAL SIGNS: LINES/DRAINS:   Pre Treatment: Pain Screen  Pain Scale 1: Numeric (0 - 10)  Pain Intensity 1: 3  Pain Onset 1: post op  Pain Location 1: Knee  Pain Orientation 1: Left  Pain Description 1: Sore  Pain Intervention(s) 1: Repositioned  Post Treatment: no c/o pain, resting comfortably in bed   LIAM Drain x2  O2 Device: None (Room air)     ACTIVITIES OF DAILY LIVING: I Mod I S SBA CGA Min Mod Max Total NT Comments   BASIC ADLs:              Bathing/ Showering [] [] [] [] [] [] [] [] [] [x]    Toileting [] [] [] [] [] [] [] [] [] [x]    Dressing [] [] [] [] [] [] [] [] [] [x]    Feeding [] [] [] [] [] [] [] [] [] [x]    Grooming [] [] [x] [] [x] [] [] [] [] [] CGA RW brushing teeth standing at sink, set up lathering shampoo cap, drying hair and combing hair seated in chair   Personal Device Care [] [] [] [] [] [] [] [] [] [x]    Functional Mobility [] [] [] [] [x] [] [] [] [] [] RW   I=Independent, Mod I=Modified Independent, S=Supervision, SBA=Standby Assistance, CGA=Contact Guard Assistance,   Min=Minimal Assistance, Mod=Moderate Assistance, Max=Maximal Assistance, Total=Total Assistance, NT=Not Tested    MOBILITY: I Mod I S SBA CGA Min Mod Max Total  NT x2 Comments:   Supine to sit [] [] [x] [] [] [] [] [] [] [] []    Sit to supine [] [] [x] [] [] [] [] [] [] [] [] Sit to stand [] [] [] [] [x] [] [] [] [] [] [] RW   Bed to chair [] [] [] [] [x] [] [] [] [] [] [] RW   I=Independent, Mod I=Modified Independent, S=Supervision, SBA=Standby Assistance, CGA=Contact Guard Assistance,   Min=Minimal Assistance, Mod=Moderate Assistance, Max=Maximal Assistance, Total=Total Assistance, NT=Not Tested    BALANCE: Good Fair+ Fair Fair- Poor NT Comments   Sitting Static [x] [] [] [] [] [] EOB   Sitting Dynamic [] [] [] [] [] [x]              Standing Static [] [x] [] [] [] [] SBA RW   Standing Dynamic [] [x] [] [] [] [] CGA RW     PLAN:   FREQUENCY/DURATION: OT Plan of Care: 3 times/week for duration of hospital stay or until stated goals are met, whichever comes first.    TREATMENT:   TREATMENT:   ($$ Self Care/Home Management: 8-22 mins$$ Therapeutic Activity: 8-22 mins   )  Therapeutic Activity (10 Minutes): Therapeutic activity included Supine to Sit, Sit to Supine, Scooting, Transfer Training, Ambulation on level ground and Standing balance to improve functional Mobility, Strength and Activity tolerance. Self Care (13 Minutes): Self care including Grooming, ADL Adaptive Equipment Training and functional transfers in prep for ADLs and ambulating household distances to increase independence and decrease level of assistance required.     TREATMENT GRID:  N/A    AFTER TREATMENT POSITION/PRECAUTIONS:  Bed, Needs within reach and RN notified    INTERDISCIPLINARY COLLABORATION:  RN/PCT and OT/HILLMAN    TOTAL TREATMENT DURATION:  OT Patient Time In/Time Out  Time In: 0930  Time Out: [address] [facility],

## 2021-12-01 NOTE — PROGRESS NOTES
Infectious Disease Note    Today's Date: 12/1/2021   Admit Date: 11/27/2021    Impression:   · MSSA left TKA infection  -Status post washout with polyexchange 11/28  -L TKA placement 10/29/21    Plan:   · Continue Rocephin 2g IV q 24h for 6 weeks eot 1/10/21. Plan to transition to cefadroxil for suppression thereafter. · Continue Rifampin 300mg oral BID for at least 6 weeks. · Dispo: home soon. ID appt 1/10 at 1040A. First option is for pt to self-infuse at home--we will run costs via insurance and see if this is affordable. Second option is for infusion center. Anti-infectives:   · Vancomycin 11/27-11/30  ·  Zosyn 11/27-11/29  · CTX 11/30-    Subjective:   Afebrile, working with PT. Basil Morgan CM to talk with pt and his wife about dispo plans. Patient is a 64 y.o. male with medical history for osteoarthritis status post right TKA 3/2021, gout, CKD 2, morbid obesity who was admitted on November 27 for acute onset left knee pain. Patient recently underwent left TKA on October 29. Patient reports that he did well until Wednesday-November 24. Last week patient went on a short trip to [facility] and walked around than usual.  He also soaked in a hot tub. He noticed on November 24 that his right knee began to swell and became very painful. Dr. Eda Lees called in an antibiotic on Friday--patient cannot remember the name this did take a few doses. Patient symptoms continued and he presented for admission November 27. Dr. Eda Lees took him to the OR on November 28 for a washout with a polyexchange. Operative note reveals purulence to knee joint and prepatellar bursa. Operative culture with staph aureus. Postoperatively started on vancomycin and Zosyn. His wife was present. Denies fever, chills, sweats. No Active Allergies     Review of Systems:  A comprehensive review of systems was negative except for that written in the History of Present Illness.     Objective:     Visit Vitals  /82 (BP 1 Location: Left upper arm, BP Patient Position: Sitting)   Pulse 77   Temp 98.9 °F (37.2 °C)   Resp 19   Ht 5' 9\" (1.753 m)   Wt 130.2 kg (287 lb)   SpO2 93%   BMI 42.38 kg/m²     Temp (24hrs), Av.1 °F (36.7 °C), Min:97.4 °F (36.3 °C), Max:98.9 °F (37.2 °C)       Lines:  Peripheral IV:       Physical Exam:    General:  Alert, cooperative, well noursished, well developed, appears stated age   Eyes:  Sclera anicteric. Pupils equally round and reactive to light. Mouth/Throat: Mucous membranes normal, oral pharynx clear   Neck: Supple   Lungs:   Clear to auscultation bilaterally, good effort   CV:  Regular rate and rhythm,no murmur, click, rub or gallop   Abdomen:   Soft, non-tender. bowel sounds normal. non-distended   Extremities: No cyanosis or edema   Skin: Skin color, texture, turgor normal. no acute rash or lesions   Lymph nodes: Cervical and supraclavicular normal   Musculoskeletal:  L Knee in postop wrap   Lines/Devices:  Intact, no erythema, drainage or tenderness   Psych: Alert and oriented, normal mood affect given the setting       Data Review:     CBC:  Recent Labs     21  0631 21  1142 21  0413   WBC 7.6 9.9  --    GRANS 66 85*  --    MONOS 8 6  --    EOS 3 0*  --    ANEU 5.0 8.3*  --    ABL 1.7 0.9  --    HGB 10.1* 10.3* 10.3*   HCT 31.8* 32.6*  --     222  --        BMP:  Recent Labs     21  0631 21  1214 21  0413   CREA 1.23 1.38 1.11   BUN 34* 29*  --     137*  --    K 4.0 4.2  --     104  --    CO2 26 27  --    AGAP 6* 6*  --    * 100  --        LFTS:  Recent Labs     21  0951   TBILI 0.5   ALT 24   AP 81   TP 6.6   ALB 2.1*       Microbiology:     All Micro Results     Procedure Component Value Units Date/Time    CULTURE, BODY FLUID Amara Mulch STAIN [[id number]]  (Abnormal)  (Susceptibility) Collected: 21 0900    Order Status: Completed Specimen:  Body Fluid from Joint Fluid Updated: 21 6379     Special Requests: PATIENT HAD A DOSE OF VANCOMYCIN ON 11/28/21     GRAM STAIN 40 TO 80  WBCS/OIF            MODERATE GRAM POSITIVE COCCI           Culture result:       HEAVY STAPHYLOCOCCUS AUREUS          COVID-19 RAPID TEST [[id number]] Collected: 11/28/21 0930    Order Status: Completed Specimen: Nasopharyngeal Updated: 11/28/21 1047     Specimen source Nasopharyngeal        COVID-19 rapid test Not detected        Comment:      The specimen is NEGATIVE for SARS-CoV-2, the novel coronavirus associated with COVID-19. A negative result does not rule out COVID-19. This test has been authorized by the FDA under an Emergency Use Authorization (EUA) for use by authorized laboratories. Fact sheet for Healthcare Providers: ConventionUpdate.co.nz  Fact sheet for Patients: ConventionUpdate.co.nz       Methodology: Isothermal Nucleic Acid Amplification         CULTURE, BODY FLUID Jinny RAMIREZ [[id number]]     Order Status: Canceled Specimen:  Body Fluid from Joint Fluid           Imaging:   See HPI/EPIC     Signed By: Gagan Mays NP     December 1, 2021

## 2021-12-01 NOTE — PROGRESS NOTES
SW met with pt in the room with spouse on speaker phone to discuss home infusion and home health services. SW explained HH and home infusion services. Pt will be responsible for $9/day services/supplies fee and $69/week medication copay. Pt's total out of pocket will be approximately $900. Pt/spouse are agreeable with this. TC to Hermelinda at [employer]. Left voice mail message with pt/spouse's decision. They will arrange for their liaison to meet with the pt/spouse prior to dc to initiate teaching and services. Located within [facility] referral for RN/PT/OT submitted to [facility]. SW following to continue to assist as needed.

## 2021-12-01 NOTE — PROGRESS NOTES
Problem: Falls - Risk of  Goal: *Absence of Falls  Description: Document Yuliana Banuelos Fall Risk and appropriate interventions in the flowsheet. Outcome: Progressing Towards Goal  Note: Fall Risk Interventions:  Mobility Interventions: Bed/chair exit alarm, Communicate number of staff needed for ambulation/transfer, Patient to call before getting OOB         Medication Interventions: Evaluate medications/consider consulting pharmacy, Patient to call before getting OOB, Teach patient to arise slowly    Elimination Interventions: Bed/chair exit alarm, Call light in reach, Patient to call for help with toileting needs, Stay With Me (per policy), Toilet paper/wipes in reach, Toileting schedule/hourly rounds    History of Falls Interventions: Bed/chair exit alarm         Problem: Patient Education: Go to Patient Education Activity  Goal: Patient/Family Education  Outcome: Progressing Towards Goal     Problem: Pressure Injury - Risk of  Goal: *Prevention of pressure injury  Description: Document Tony Scale and appropriate interventions in the flowsheet.   Outcome: Progressing Towards Goal  Note: Pressure Injury Interventions:  Sensory Interventions: Assess changes in LOC, Avoid rigorous massage over bony prominences    Moisture Interventions: Absorbent underpads, Check for incontinence Q2 hours and as needed    Activity Interventions: Increase time out of bed, Pressure redistribution bed/mattress(bed type), PT/OT evaluation    Mobility Interventions: HOB 30 degrees or less, Pressure redistribution bed/mattress (bed type), PT/OT evaluation    Nutrition Interventions: Document food/fluid/supplement intake, Offer support with meals,snacks and hydration    Friction and Shear Interventions: HOB 30 degrees or less                Problem: Patient Education: Go to Patient Education Activity  Goal: Patient/Family Education  Outcome: Progressing Towards Goal     Problem: Patient Education: Go to Patient Education Activity  Goal: Patient/Family Education  Outcome: Progressing Towards Goal     Problem: Patient Education: Go to Patient Education Activity  Goal: Patient/Family Education  Outcome: Progressing Towards Goal

## 2021-12-01 NOTE — PROGRESS NOTES
Orthopedic Joint Progress Note    2021  Admit Date: 2021  Admit Diagnosis: Left knee pain [M25.562]    3 Days Post-Op    Subjective:     Cain Migue. alert, oriented and moderate pain    Review of Systems: Pertinent items are noted in HPI. Objective:     PT/OT:     PATIENT MOBILITY                           Vital Signs:    Blood pressure (!) 146/83, pulse 74, temperature 98.3 °F (36.8 °C), resp. rate 18, height 5' 9\" (1.753 m), weight 130.2 kg (287 lb), SpO2 95 %.   Temp (24hrs), Av.3 °F (36.8 °C), Min:97.9 °F (36.6 °C), Max:98.9 °F (37.2 °C)      Pain Control:   Pain Assessment  Pain Scale 1: Numeric (0 - 10)  Pain Intensity 1: 6  Pain Onset 1: postop  Pain Location 1: Knee  Pain Orientation 1: Left  Pain Description 1: Aching  Pain Intervention(s) 1: Medication (see MAR)    Meds:  Current Facility-Administered Medications   Medication Dose Route Frequency    influenza vaccine  (6 mos+)(PF) (FLUARIX/FLULAVAL/FLUZONE QUAD) injection 0.5 mL  1 Each IntraMUSCular PRIOR TO DISCHARGE    cefTRIAXone (ROCEPHIN) 2 g in 0.9% sodium chloride (MBP/ADV) 50 mL MBP  2 g IntraVENous Q24H    sodium chloride (NS) flush 10 mL  10 mL InterCATHeter Q8H    sodium chloride (NS) flush 10 mL  10 mL InterCATHeter PRN    rifAMPin (RIFADIN) capsule 300 mg  300 mg Oral Q12H    allopurinoL (ZYLOPRIM) tablet 300 mg  300 mg Oral BID    atorvastatin (LIPITOR) tablet 40 mg  40 mg Oral DAILY    chlorthalidone (HYGROTON) tablet 25 mg  25 mg Oral DAILY    FLUoxetine (PROzac) capsule 60 mg  60 mg Oral DAILY    pantoprazole (PROTONIX) tablet 40 mg  40 mg Oral BID    prazosin (MINIPRESS) capsule 4 mg  4 mg Oral QHS    zolpidem (AMBIEN) tablet 10 mg  10 mg Oral QHS PRN    alcohol 62% (NOZIN) nasal  1 Ampule  1 Ampule Topical Q12H    sodium chloride (NS) flush 5-40 mL  5-40 mL IntraVENous Q8H    sodium chloride (NS) flush 5-40 mL  5-40 mL IntraVENous PRN    acetaminophen (TYLENOL) tablet 1,000 mg  1,000 mg Oral Q6H    celecoxib (CELEBREX) capsule 200 mg  200 mg Oral Q12H    oxyCODONE IR (ROXICODONE) tablet 10 mg  10 mg Oral Q4H PRN    naloxone (NARCAN) injection 0.2-0.4 mg  0.2-0.4 mg IntraVENous Q10MIN PRN    diphenhydrAMINE (BENADRYL) capsule 25 mg  25 mg Oral Q4H PRN    senna-docusate (PERICOLACE) 8.6-50 mg per tablet 2 Tablet  2 Tablet Oral DAILY    morphine injection 2 mg  2 mg IntraVENous Q3H PRN    HYDROmorphone (DILAUDID) tablet 2 mg  2 mg Oral Q4H PRN    HYDROmorphone (DILAUDID) injection 2 mg  2 mg IntraVENous Q4H PRN    aspirin delayed-release tablet 81 mg  81 mg Oral Q12H        LAB:    Lab Results   Component Value Date/Time    INR 1.0 09/03/2019 09:42 AM     Lab Results   Component Value Date/Time    HGB 10.1 (L) 11/30/2021 06:31 AM    HGB 10.3 (L) 11/29/2021 11:42 AM    HGB 10.3 (L) 11/29/2021 04:13 AM       Wound Knee Anterior; Left Surgical site 11/27/21 (Active)   Dressing Status Clean; Dry; Intact 12/01/21 0805   Dressing/Treatment Open to air 11/27/21 2320   Drainage Amount Moderate 11/27/21 2320   Drainage Description Purulent; Serous 11/27/21 2320   Wound Odor None 11/27/21 2320   Number of days:        Incision 11/28/21 Leg Left (Active)   Number of days: 3       [REMOVED] Incision 10/29/21 Knee Left (Removed)   Number of days: 20         Physical Exam:  No significant changes, drains in place with still moderate outflow    Assessment:      Principal Problem:    Septic arthritis of knee, left ([facility] (11/28/2021)    Active Problems:    HLD (hyperlipidemia) (11/14/2012)      HTN (hypertension) (11/14/2012)      TRINA on CPAP (11/14/2012)      Gout (11/14/2012)      GERD (gastroesophageal reflux disease) (5/14/2013)      Obesity, Class III, BMI 40-49.9 (morbid obesity) ([address] (8/27/2019)      Acute pain of left knee (7/15/2021)      Stage 3 chronic kidney disease ([facility] (7/15/2021)      Left knee pain (11/27/2021)         Plan:Discussed with ID, home on Rocephin and Rifampin. Drains still with good output. Remove drains tomorrow and probable discharge.      Continue PT/OT/Rehab  Consult:   PT, ID, Hospitalists    Patient Expects to be Discharged to[de-identified] [facility]

## 2021-12-02 VITALS
BODY MASS INDEX: 42.51 KG/M2 | HEIGHT: 69 IN | WEIGHT: 287 LBS | OXYGEN SATURATION: 95 % | RESPIRATION RATE: 18 BRPM | TEMPERATURE: 97.8 F | HEART RATE: 81 BPM | SYSTOLIC BLOOD PRESSURE: 164 MMHG | DIASTOLIC BLOOD PRESSURE: 87 MMHG

## 2021-12-02 PROCEDURE — 97110 THERAPEUTIC EXERCISES: CPT

## 2021-12-02 PROCEDURE — 74011250636 HC RX REV CODE- 250/636: Performed by: NURSE PRACTITIONER

## 2021-12-02 PROCEDURE — 74011000258 HC RX REV CODE- 258: Performed by: NURSE PRACTITIONER

## 2021-12-02 PROCEDURE — 2709999900 HC NON-CHARGEABLE SUPPLY

## 2021-12-02 PROCEDURE — 74011250637 HC RX REV CODE- 250/637: Performed by: ORTHOPAEDIC SURGERY

## 2021-12-02 PROCEDURE — 74011250637 HC RX REV CODE- 250/637: Performed by: NURSE PRACTITIONER

## 2021-12-02 PROCEDURE — 97530 THERAPEUTIC ACTIVITIES: CPT

## 2021-12-02 RX ORDER — RIFAMPIN 300 MG/1
300 CAPSULE ORAL EVERY 12 HOURS
Qty: 42 CAPSULE | Refills: 1 | Status: SHIPPED | OUTPATIENT
Start: 2021-12-02

## 2021-12-02 RX ADMIN — HYDROMORPHONE HYDROCHLORIDE 2 MG: 2 TABLET ORAL at 06:04

## 2021-12-02 RX ADMIN — PANTOPRAZOLE SODIUM 40 MG: 40 TABLET, DELAYED RELEASE ORAL at 08:22

## 2021-12-02 RX ADMIN — OXYCODONE 10 MG: 5 TABLET ORAL at 08:22

## 2021-12-02 RX ADMIN — ASPIRIN 81 MG: 81 TABLET ORAL at 08:20

## 2021-12-02 RX ADMIN — CHLORTHALIDONE 25 MG: 25 TABLET ORAL at 08:21

## 2021-12-02 RX ADMIN — ALLOPURINOL 300 MG: 300 TABLET ORAL at 08:22

## 2021-12-02 RX ADMIN — CEFTRIAXONE 2 G: 2 INJECTION, POWDER, FOR SOLUTION INTRAMUSCULAR; INTRAVENOUS at 11:12

## 2021-12-02 RX ADMIN — SENNOSIDES AND DOCUSATE SODIUM 2 TABLET: 8.6; 5 TABLET ORAL at 08:22

## 2021-12-02 RX ADMIN — OXYCODONE 10 MG: 5 TABLET ORAL at 03:49

## 2021-12-02 RX ADMIN — ACETAMINOPHEN 1000 MG: 500 TABLET ORAL at 11:12

## 2021-12-02 RX ADMIN — ACETAMINOPHEN 1000 MG: 500 TABLET ORAL at 06:04

## 2021-12-02 RX ADMIN — OXYCODONE 10 MG: 5 TABLET ORAL at 11:59

## 2021-12-02 RX ADMIN — Medication 10 ML: at 06:09

## 2021-12-02 RX ADMIN — CELECOXIB 200 MG: 100 CAPSULE ORAL at 06:04

## 2021-12-02 RX ADMIN — RIFAMPIN 300 MG: 300 CAPSULE ORAL at 06:04

## 2021-12-02 RX ADMIN — FLUOXETINE 60 MG: 20 CAPSULE ORAL at 08:23

## 2021-12-02 NOTE — PROGRESS NOTES
[facility] HOSPITALIST CONSULT      Patient ID:  NAME:  Renaldo Bahena. Age/Sex: 64 y.o. male  :   1960   MRN:   [id number]    Admission Date: 2021  Consult Date: 2021  Chief Complaint: Left knee pain and swelling  Reason for Admission: Septic arthritis of knee, left ([facility]  Reason for Consult: HTN and TRINA    Active Assessment/Plan (MDM):      HLD (hyperlipidemia) (2012)  - Continue Lipitor      HTN (hypertension) (2012)  - Continue Chlorthalidone    : Will monitor. 2021  Continue current medical management      TRINA on CPAP (2012)  - Continue CPAP with resting  - Currently hypoxic after anesthesia  - Check CXR  - Denies SOB  : X-ray 1 view negative. Not using oxygen in the morning. Uses CPAP at night. Denies any respiratory symptoms. Will monitor. : Not requiring oxygen during daytime. 2021  Continue current medical management      Gout (2012)  - Continue Allopurinol      2021  Current medical management      GERD (gastroesophageal reflux disease) (2013)  - Continue PPI  2021  Continue PPI         Stage 3 chronic kidney disease ([facility] (7/15/2021)  - Creatinine stable    : Lab work ordered. : Creatinine at baseline. 2021  Creatinine remains stable    #Septic joint: Ortho and ID on board. ID has given outpatient management plan. See their note    Plan is for discharge in a.m. Surgery and surgery related management including DVT prophylaxis, drop in hemoglobin and pain management per primary team.    Rest management per primary team.    Please notify medicine team if drop in hemoglobin is more than expected for the surgery. Thanks for consultation. We will continue to follow along on daily basis. Please notify on-call medicine physician with questions or if patient clinical condition changes.      All Medical Diagnoses:     Hospital Problems as of 12/1/2021 Date Reviewed: 7/15/2021          Codes Class Noted - Resolved POA    * (Principal) Septic arthritis of knee, left ([facility] ICD-10-CM: M00.9  ICD-9-CM: 711.06  11/28/2021 - Present Yes        Left knee pain ICD-10-CM: M25.562  ICD-9-CM: 719.46  11/27/2021 - Present Yes        Acute pain of left knee ICD-10-CM: M25.562  ICD-9-CM: 719.46  7/15/2021 - Present Yes        Stage 3 chronic kidney disease ([facility] ICD-10-CM: N18.30  ICD-9-CM: 585.3  7/15/2021 - Present Yes        Obesity, Class III, BMI 40-49.9 (morbid obesity) ([facility] ICD-10-CM: E66.01  ICD-9-CM: 278.01  8/27/2019 - Present Yes        GERD (gastroesophageal reflux disease) ICD-10-CM: K21.9  ICD-9-CM: 530.81  5/14/2013 - Present Yes        HLD (hyperlipidemia) ICD-10-CM: E78.5  ICD-9-CM: 272.4  11/14/2012 - Present Yes        HTN (hypertension) ICD-10-CM: I10  ICD-9-CM: 401.9  11/14/2012 - Present Yes        TRINA on CPAP ICD-10-CM: G47.33, Z99.89  ICD-9-CM: 327.23, V46.8  11/14/2012 - Present Yes        Gout ICD-10-CM: M10.9  ICD-9-CM: 274.9  11/14/2012 - Present Yes              HPI:     Demetris Estevez is a 64year old CM with a PMH of CKD (Stage III), CAD, HTN, HLD, TRINA on CPAP, and left TKA on 10/29/21 who presented from an outside ER with acute right knee pain, swelling, and erythema after going to [place] and walking on it and getting in hot tub/pool. He presented to the ER with pain and swelling. Orthopedics admitted and took to the OR for I&D with washout of purulent drainage. A drain was placed. Currently he is on his CPAP post-surgery but alert and offers only complaint of left knee pain of 6/10. Denies CP/SOB/cough. Denies F/C. Denies N/V/D. November 29: Patient admitted to Ortho service with septic arthritis status post I&D. ID was consulted. Medicine consulted for management of hypertension. Patient was on CPAP he took CPAP for. Feeling better. As per patient is dealing with this infection for a month now.   Denies any nausea, vomiting, chest pain or palpitation. : Patient's wife at bedside. As per patient he is feeling good. He is waiting for PICC line placement for his antibiotics. Denies any nausea, vomiting, constipation, headache, chest pain or shortness of breath. Respiratory system negative except mentioned above. 2021  Patient seen and evaluated. Discharge held today secondary to significant drainage from his LIAM drains. Possible discharge in the a.m. if drainage has decreased  He was afebrile overnight  Denies any chest pain nausea vomiting. Objective:       Visit Vitals  /83 (BP 1 Location: Left upper arm, BP Patient Position: Reclining)   Pulse 76   Temp 97.9 °F (36.6 °C)   Resp 16   Ht 5' 9\" (1.753 m)   Wt 130.2 kg (287 lb)   SpO2 96%   BMI 42.38 kg/m²        Temp (24hrs), Av.2 °F (36.8 °C), Min:97.9 °F (36.6 °C), Max:98.9 °F (37.2 °C)      Oxygen Therapy  O2 Sat (%): 96 % (21 1513)  Pulse via Oximetry: 103 beats per minute (21 1731)  O2 Device: None (Room air) (21 1030)  O2 Flow Rate (L/min): 2 l/min (21 0548)      Physical Exam:    General:    Alert, cooperative, no distress, appears stated age. Eyes:   PERRLA EOMI Anicteric  Head:   Normocephalic, without obvious abnormality, atraumatic. Lungs:   Decrease entry bilateral lower lobe otherwise clear to auscultation bilaterally. No Wheezing or Rhonchi. No rales. Heart:  Tachy, reg rhythm,  no murmur, rub or gallop. No JVD. Abdomen:   Soft, non-tender. Not distended. Bowel sounds normal.   MSK:  Left knee with edema, LIAM drain in place. No clubbing or cyanosis. No deformities/lesions. Skin:     Texture, turgor normal. No rashes or lesions. No Jaundice. Neurologic: Alert and oriented x 3, moving all 4 extremities except the left one is limited secondary to surgery. Speech normal.  Psychiatry:      No abnormal mood or behavior noted.     Lab/Data Review:  No results found for this or any previous visit (from the past 24 hour(s)). Imaging:  XR CHEST SNGL V    Result Date: 11/28/2021  1. No acute cardiopulmonary process. CPT code(s) 35216     DUPLEX LOWER EXT VENOUS LEFT    Result Date: 11/28/2021  No evidence of deep venous thrombosis in the left lower extremity. Cultures: All Micro Results     Procedure Component Value Units Date/Time    CULTURE, BODY FLUID Amara Mulch STAIN [[id number]]  (Abnormal)  (Susceptibility) Collected: 11/28/21 0900    Order Status: Completed Specimen: Body Fluid from Joint Fluid Updated: 11/30/21 0754     Special Requests: PATIENT HAD A DOSE OF VANCOMYCIN ON 11/28/21     GRAM STAIN 40 TO 80  WBCS/OIF            MODERATE GRAM POSITIVE COCCI           Culture result:       HEAVY STAPHYLOCOCCUS AUREUS          COVID-19 RAPID TEST [[id number]] Collected: 11/28/21 0930    Order Status: Completed Specimen: Nasopharyngeal Updated: 11/28/21 1047     Specimen source Nasopharyngeal        COVID-19 rapid test Not detected        Comment:      The specimen is NEGATIVE for SARS-CoV-2, the novel coronavirus associated with COVID-19. A negative result does not rule out COVID-19. This test has been authorized by the FDA under an Emergency Use Authorization (EUA) for use by authorized laboratories. Fact sheet for Healthcare Providers: ConventionUpdate.co.nz  Fact sheet for Patients: ConventionUpdate.co.nz       Methodology: Isothermal Nucleic Acid Amplification         CULTURE, BODY FLUID Amara Mulch STAIN [[id number]]     Order Status: Canceled Specimen:  Body Fluid from Joint Fluid           Active Problems:     Principal Diagnosis Septic arthritis of knee, left [facility]    Hospital Problems as of 12/1/2021 Date Reviewed: 7/15/2021          Codes Class Noted - Resolved POA    * (Principal) Septic arthritis of knee, left ([facility] ICD-10-CM: M00.9  ICD-9-CM: 711.06  11/28/2021 - Present Yes        Left knee pain ICD-10-CM: M25.562  ICD-9-CM: 719.46 11/27/2021 - Present Yes        Acute pain of left knee ICD-10-CM: M25.562  ICD-9-CM: 719.46  7/15/2021 - Present Yes        Stage 3 chronic kidney disease ([facility] ICD-10-CM: N18.30  ICD-9-CM: 585.3  7/15/2021 - Present Yes        Obesity, Class III, BMI 40-49.9 (morbid obesity) ([facility] ICD-10-CM: E66.01  ICD-9-CM: 278.01  8/27/2019 - Present Yes        GERD (gastroesophageal reflux disease) ICD-10-CM: K21.9  ICD-9-CM: 530.81  5/14/2013 - Present Yes        HLD (hyperlipidemia) ICD-10-CM: E78.5  ICD-9-CM: 272.4  11/14/2012 - Present Yes        HTN (hypertension) ICD-10-CM: I10  ICD-9-CM: 401.9  11/14/2012 - Present Yes        TRINA on CPAP ICD-10-CM: G47.33, Z99.89  ICD-9-CM: 327.23, V46.8  11/14/2012 - Present Yes        Gout ICD-10-CM: M10.9  ICD-9-CM: 274.9  11/14/2012 - Present Yes                  Declan Frank MD  [facility]  12/1/2021

## 2021-12-02 NOTE — PROGRESS NOTES
Infectious Disease Note    Today's Date: 12/2/2021   Admit Date: 11/27/2021    Impression:   · MSSA left TKA infection  -Status post washout with polyexchange 11/28  -L TKA placement 10/29/21    Plan:   · Continue Rocephin 2g IV q 24h for 6 weeks eot 1/10/21. Plan to transition to cefadroxil for suppression thereafter. · Continue Rifampin 300mg oral BID for at least 6 weeks. · Dispo: home today. ID appt 1/10 at 1040A. Anti-infectives:   · Vancomycin 11/27-11/30  ·  Zosyn 11/27-11/29  · CTX 11/30-    Subjective:   Afebrile, no issues with IV abx plan. Appt card given. Patient is a 64 y.o. male with medical history for osteoarthritis status post right TKA 3/2021, gout, CKD 2, morbid obesity who was admitted on November 27 for acute onset left knee pain. Patient recently underwent left TKA on October 29. Patient reports that he did well until Wednesday-November 24. Last week patient went on a short trip to [facility] and walked around than usual.  He also soaked in a hot tub. He noticed on November 24 that his right knee began to swell and became very painful. Dr. Ralph Lopez called in an antibiotic on Friday--patient cannot remember the name this did take a few doses. Patient symptoms continued and he presented for admission November 27. Dr. Ralph Lopez took him to the OR on November 28 for a washout with a polyexchange. Operative note reveals purulence to knee joint and prepatellar bursa. Operative culture with staph aureus. Postoperatively started on vancomycin and Zosyn. His wife was present. Denies fever, chills, sweats. No Active Allergies     Review of Systems:  A comprehensive review of systems was negative except for that written in the History of Present Illness.     Objective:     Visit Vitals  BP (!) 147/85 (BP 1 Location: Left lower arm, BP Patient Position: At rest)   Pulse 78   Temp 97.9 °F (36.6 °C)   Resp 18   Ht 5' 9\" (1.753 m)   Wt 130.2 kg (287 lb)   SpO2 91%   BMI 42.38 kg/m²     Temp (24hrs), Av °F (36.7 °C), Min:97.9 °F (36.6 °C), Max:98.3 °F (36.8 °C)       Lines:  Peripheral IV:       Physical Exam:    General:  Alert, cooperative, well noursished, well developed, appears stated age   Eyes:  Sclera anicteric. Pupils equally round and reactive to light. Mouth/Throat: Mucous membranes normal, oral pharynx clear   Neck: Supple   Lungs:   Clear to auscultation bilaterally, good effort   CV:  Regular rate and rhythm,no murmur, click, rub or gallop   Abdomen:   Soft, non-tender. bowel sounds normal. non-distended   Extremities: No cyanosis or edema   Skin: Skin color, texture, turgor normal. no acute rash or lesions   Lymph nodes: Cervical and supraclavicular normal   Musculoskeletal:  L Knee in postop wrap   Lines/Devices:  Intact, no erythema, drainage or tenderness   Psych: Alert and oriented, normal mood affect given the setting       Data Review:     CBC:  Recent Labs     21  0631 21  1142   WBC 7.6 9.9   GRANS 66 85*   MONOS 8 6   EOS 3 0*   ANEU 5.0 8.3*   ABL 1.7 0.9   HGB 10.1* 10.3*   HCT 31.8* 32.6*    222       BMP:  Recent Labs     21  0631 21  1214   CREA 1.23 1.38   BUN 34* 29*    137*   K 4.0 4.2    104   CO2 26 27   AGAP 6* 6*   * 100       LFTS:  No results for input(s): TBILI, ALT, AP, TP, ALB in the last 72 hours. No lab exists for component: SGOT    Microbiology:     All Micro Results     Procedure Component Value Units Date/Time    CULTURE, BODY FLUID Raford Payer STAIN [[id number]]  (Abnormal)  (Susceptibility) Collected: 21 09    Order Status: Completed Specimen:  Body Fluid from Joint Fluid Updated: 21 8999     Special Requests: PATIENT HAD A DOSE OF VANCOMYCIN ON 21     GRAM STAIN 40 TO 80  WBCS/OIF            MODERATE GRAM POSITIVE COCCI           Culture result:       HEAVY STAPHYLOCOCCUS AUREUS          COVID-19 RAPID TEST [[id number]] Collected: 21    Order Status: Completed Specimen: Nasopharyngeal Updated: 11/28/21 1047     Specimen source Nasopharyngeal        COVID-19 rapid test Not detected        Comment:      The specimen is NEGATIVE for SARS-CoV-2, the novel coronavirus associated with COVID-19. A negative result does not rule out COVID-19. This test has been authorized by the FDA under an Emergency Use Authorization (EUA) for use by authorized laboratories. Fact sheet for Healthcare Providers: ConventionUpdate.co.nz  Fact sheet for Patients: ConventionUpdate.co.nz       Methodology: Isothermal Nucleic Acid Amplification         CULTURE, BODY FLUID Mercedes RAMIREZ [[id number]]     Order Status: Canceled Specimen:  Body Fluid from Joint Fluid           Imaging:   See HPI/EPIC     Signed By: Jena Jeffries NP     December 2, 2021

## 2021-12-02 NOTE — DISCHARGE SUMMARY
Dr. Charly Arreguin  Discharge Summary      Patient ID:  Deepa Valera  [id number]  64 y.o.  1960    Admit date: 11/27/2021    Discharge date and time: 12/2/2021     Admitting Physician: Sepideh Smyth MD     Discharge Physician: Parul Kohli NP    Admission Diagnoses: Left knee pain [M25.562] septic joint s/p TKA, I/D    Discharge Diagnoses:   Problem List as of 12/2/2021 Date Reviewed: 7/15/2021          Codes Class Noted - Resolved    * (Principal) Septic arthritis of knee, left ([facility] ICD-10-CM: M00.9  ICD-9-CM: 711.06  11/28/2021 - Present        Left knee pain ICD-10-CM: M25.562  ICD-9-CM: 719.46  11/27/2021 - Present        Localized osteoarthritis of left knee ICD-10-CM: M17.12  ICD-9-CM: 715.36  10/29/2021 - Present        Chronic idiopathic gout involving toe of left foot without tophus ICD-10-CM: M1A.0720  ICD-9-CM: 274.02  7/15/2021 - Present        Acute pain of left knee ICD-10-CM: M25.562  ICD-9-CM: 719.46  7/15/2021 - Present        Stage 3 chronic kidney disease ([facility] ICD-10-CM: N18.30  ICD-9-CM: 585.3  7/15/2021 - Present        Obesity, Class III, BMI 40-49.9 (morbid obesity) ([facility] ICD-10-CM: E66.01  ICD-9-CM: 278.01  8/27/2019 - Present        GERD (gastroesophageal reflux disease) ICD-10-CM: K21.9  ICD-9-CM: 530.81  5/14/2013 - Present        HLD (hyperlipidemia) ICD-10-CM: E78.5  ICD-9-CM: 272.4  11/14/2012 - Present        Obesity ICD-10-CM: E66.9  ICD-9-CM: 278.00  11/14/2012 - Present        HTN (hypertension) ICD-10-CM: I10  ICD-9-CM: 401.9  11/14/2012 - Present        TRINA on CPAP ICD-10-CM: G47.33, Z99.89  ICD-9-CM: 327.23, V46.8  11/14/2012 - Present        Gout ICD-10-CM: M10.9  ICD-9-CM: 274.9  11/14/2012 - Present        Osteoarthritis ICD-10-CM: M19.90  ICD-9-CM: 715.90  11/14/2012 - Present        Dyspnea ICD-10-CM: R06.00  ICD-9-CM: 786.09  11/14/2012 - Present        CAD (coronary artery disease) ICD-10-CM: I25.10  ICD-9-CM: 414.00  11/14/2012 - Present              Surgeon: Kayce Reyna NP    Preoperative Medical Clearance: NA                          Perioperative Antibiotics: Ancef  ___                                                Vancomycin  __x_      Postoperative Pain Management:  Patient to be discharged with [facility] for antibiotics IV Rocephin. Oral Rifampin 300 mg BID, oxycodone 5 mg electronically sent to pharmacy of patient's choice. DVT Prophylaxis: ASA 81 mg BID                                  TERE Hose                                  Plexi-Pulse    Postoperative transfusions:     none Banked PRBCs     Post Op complications: none    Hemoglobin at discharge:   Lab Results   Component Value Date/Time    HGB 10.1 (L) 11/30/2021 06:31 AM       Wound appears to be healing without any evidence of infection. Both drains discontinued today. Small amount of serous fluid in each drain. New sterile dressing applied. Knee looks greatly improved, no erythema, mild effusion, staples intact, patient reports decreased pain. Patient apologized again today for going swimming and in a hot tub only 3 weeks post op. He states that he will be very careful now. Physical Therapy started on the day following surgery and progressed to independent ambulation with the aid of a walker. At the time of discharge, able to go up and down stairs and had understanding of precautions needed following surgery. PT at time of DC: full extension, 80 degrees flexion    Current Discharge Medication List      START taking these medications    Details   cefTRIAXone 2 gram 2 g IVPB 2 g by IntraVENous route every twenty-four (24) hours. Qty: 21 Dose, Refills: 0  Start date: 12/3/2021    Comments: Ordered by ID to be administered at home      rifAMPin (RIFADIN) 300 mg capsule Take 1 Capsule by mouth every twelve (12) hours.   Qty: 42 Capsule, Refills: 1  Start date: 12/2/2021         CONTINUE these medications which have NOT CHANGED    Details   atorvastatin (LIPITOR) 40 mg tablet TAKE 1 TABLET BY MOUTH EVERY DAY  Qty: 30 Tablet, Refills: 1    Associated Diagnoses: Mixed hyperlipidemia      !! chlorthalidone (HYGROTON) 25 mg tablet Take 25 mg by mouth every six (6) hours as needed for PRN Reason (Other) (hiccups). oxyCODONE IR (ROXICODONE) 5 mg immediate release tablet Take 5 mg by mouth every four (4) hours as needed for Pain. Indications: pain      !! chlorthalidone (HYGROTON) 25 mg tablet Take 25 mg by mouth daily. aspirin delayed-release 81 mg tablet Take 81 mg by mouth daily. FLUoxetine (PROzac) 20 mg capsule Take 60 mg by mouth daily. prazosin (MINIPRESS) 2 mg capsule Take 4 mg by mouth nightly. pantoprazole (PROTONIX) 40 mg tablet Take 40 mg by mouth two (2) times a day. allopurinoL (ZYLOPRIM) 300 mg tablet Take 300 mg by mouth two (2) times a day. lisinopril-hydroCHLOROthiazide (PRINZIDE, ZESTORETIC) 20-12.5 mg per tablet Take 2 Tablets by mouth daily. zolpidem (Ambien) 10 mg tablet Take 10 mg by mouth nightly as needed for Sleep. !! - Potential duplicate medications found. Please discuss with provider. STOP taking these medications       diclofenac EC (VOLTAREN) 50 mg EC tablet Comments:   Reason for Stopping:               Discharged to: Home with [facility] and [facility] IV infusion. F/u appointment with ID and our office. [address] next week.      Discharge instructions:  - Anticoagulate with: ASA 81 mg BID for 30 days post surgery  -Resume pre hospital diet             -Resume home medications per medical continuation form     -Ambulate with walker, appropriate total joint protocol  -Follow up in office as scheduled       Signed:  Delores Harley NP  12/2/2021  12:21 PM

## 2021-12-02 NOTE — PROGRESS NOTES
ACUTE PHYSICAL THERAPY GOALS:  (Developed with and agreed upon by patient and/or caregiver.)  (1.) Marden Reil. will move from supine to sit and sit to supine , scoot up and down and roll side to side with CONTACT GUARD ASSIST within 7 treatment day(s). GOAL MET:   (2.) Marden Reil. will transfer from bed to chair and chair to bed with CONTACT GUARD ASSIST using the least restrictive device within 7 treatment day(s). GOAL MET:   (3.) Marden Reil. will ambulate with CONTACT GUARD ASSIST for 200+ feet with the least restrictive device within 7 treatment day(s). GOAL MET:   (4.) Marden Reil. will perform standing static and dynamic balance activities x 15 minutes with CONTACT GUARD ASSIST to improve safety within 7 treatment day(s). GOAL MET: 21  (5.) Marden Reil. will ascend and descend 2 stairs using one hand rail(s) with CONTACT GUARD ASSIST to improve functional mobility and safety within 7 treatment day(s). (6.) Marden Reil. will perform therapeutic exercises x 15 min for HEP with INDEPENDENCE to improve strength, endurance, and functional mobility within 7 treatment day(s).  GOAL MET: 21      PHYSICAL THERAPY: Daily Note and AM Treatment Day # 4   WBAT LLMARIPOSA Waterman. is a 64 y.o. male   PRIMARY DIAGNOSIS: Septic arthritis of knee, left (HCC)  Left knee pain [M25.562]  Procedure(s) (LRB):  EXTREMITY IRRIGATION AND DEBRIDEMENT LEFT KNEE / ARTICULAR INSERT REVISION (Left)  4 Days Post-Op    ASSESSMENT:     REHAB RECOMMENDATIONS: CURRENT LEVEL OF FUNCTION:  (Most Recently Demonstrated)   Recommendation to date pending progress:  [address] [place] [address]  Equipment:    Rolling Walker Bed Mobility:   Modified Independent  Sit to Stand:  [employer] Assistance  Transfers:  [address]:   Standby Assistance     ASSESSMENT:  Mr. Sara Sher is making excellent progress toward goals with increased gait distances and decreased assistance levels. He performs transfers from the chair with supervision and increased gait distances with RW. Cueing for pacing and fatigue awareness as well as gait mechanics due to antalgic gait. Good participation with exercises.       SUBJECTIVE:   Mr. Sarthak Meyers states, \"I'm ready to do this\"    SOCIAL HISTORY/ LIVING ENVIRONMENT: see eval  Home Environment: Trailer/mobile home  One/Two Story Residence: One story  Living Alone: No  Support Systems: Spouse/Significant Other  OBJECTIVE:     PAIN: VITAL SIGNS: LINES/DRAINS:   Pre Treatment: Pain Screen  Pain Scale 1: Numeric (0 - 10)  Pain Intensity 1: 5  Post Treatment: 6   LIAM Drain  O2 Device: None (Room air)     MOBILITY: I Mod I S SBA CGA Min Mod Max Total  NT x2 Comments:   Bed Mobility    Rolling [] [] [] [] [] [] [] [] [] [x] []    Supine to Sit [] [] [] [] [] [] [] [] [] [x] []    Scooting [] [] [] [] [] [] [] [] [] [x] []    Sit to Supine [] [x] [] [] [] [] [] [] [] [] []    Transfers    Sit to Stand [] [] [] [x] [] [] [] [] [] [] []    Bed to Chair [] [] [] [x] [] [] [] [] [] [] []    Stand to Sit [] [] [] [x] [] [] [] [] [] [] []    I=Independent, Mod I=Modified Independent, S=Supervision, SBA=Standby Assistance, CGA=Contact Guard Assistance,   Min=Minimal Assistance, Mod=Moderate Assistance, Max=Maximal Assistance, Total=Total Assistance, NT=Not Tested    BALANCE: Good Fair+ Fair Fair- Poor NT Comments   Sitting Static [x] [] [] [] [] []    Sitting Dynamic [x] [x] [] [] [] []              Standing Static [x] [x] [] [] [] []    Standing Dynamic [] [x] [] [] [] []      GAIT: I Mod I S SBA CGA Min Mod Max Total  NT x2 Comments:   Level of Assistance [] [] [] [x] [] [] [] [] [] [] [] A couple standing rest breaks   Distance 250'    DME Rolling Walker    Gait Quality Antalgic, decreased pace    Weightbearing  Status WBAT     I=Independent, Mod I=Modified Independent, S=Supervision, SBA=Standby Assistance, CGA=Contact Guard Assistance,   Min=Minimal Assistance, Mod=Moderate Assistance, Max=Maximal Assistance, Total=Total Assistance, NT=Not Tested    PLAN:   FREQUENCY/DURATION: PT Plan of Care: Daily for duration of hospital stay or until stated goals are met, whichever comes first.  TREATMENT:     TREATMENT:   ($$ Therapeutic Activity: 8-22 mins  $$ Therapeutic Exercises: 8-22 mins    )  Therapeutic Activity (18 Minutes): Therapeutic activity included Sit to Supine, Scooting, Transfer Training, Ambulation on level ground, Sitting balance  and Standing balance to improve functional Mobility, Strength and Activity tolerance. Therapeutic Exercise (10 Minutes): Therapeutic exercises noted below to improve functional activity tolerance, AROM, strength and mobility.      TREATMENT GRID:   Date:  11/30 Date:  12/1 Date:  12/2   Activity/Exercise Parameters Parameters Parameters   Ankle pumps x15 x10    abduction x10 AAL  x10   Quad sets x10 B  x10 (with foot elevated)   Glut sets x10     IR/ER x10     SLR x10 AAL x5 x5   LAQ  x10 x10   marching  x10 x10   Knee flexion  x10 x10         AFTER TREATMENT POSITION/PRECAUTIONS:  Chair, Needs within reach and RN notified    INTERDISCIPLINARY COLLABORATION:  RN/PCT and PT/PTA    TOTAL TREATMENT DURATION:  PT Patient Time In/Time Out  Time In: 1044  Time Out: [address], PTA

## 2021-12-02 NOTE — PROGRESS NOTES
Pt was medically cleared for dc to home today with home infusion services through [facility] and [place] RN/PT/OT services through [facility].  alerted Arielle Montes, [facility] , of pt's dc to home today and that pt received his daily dose prior to dc. Spouse at bedside and transported pt home. Care Management Interventions  PCP Verified by CM: Yes  Mode of Transport at Discharge:  Other (see comment) (spouse)  Transition of Care Consult (CM Consult): Home Health, Other (home infusion)  [address]: Yes  Discharge Durable Medical Equipment: No  Physical Therapy Consult: Yes  Occupational Therapy Consult: Yes  Support Systems: Spouse/Significant Other  Confirm Follow Up Transport: Family  The Plan for Transition of Care is Related to the Following Treatment Goals : Home health and home infusion services to improve pt's strength and functional abilities  The Patient and/or Patient Representative was Provided with a Choice of Provider and Agrees with the Discharge Plan?: Yes  Freedom of Choice List was Provided with Basic Dialogue that Supports the Patient's Individualized Plan of Care/Goals, Treatment Preferences and Shares the Quality Data Associated with the Providers?: Yes  [place] Resource Information Provided?: No  Discharge Location  Discharge Placement: Home with infusion therapy ([facility] and [facility])

## 2021-12-03 ENCOUNTER — HOME CARE VISIT (OUTPATIENT)
Dept: SCHEDULING | Facility: HOME HEALTH | Age: 61
End: 2021-12-03
Payer: MEDICARE

## 2021-12-03 VITALS
OXYGEN SATURATION: 98 % | SYSTOLIC BLOOD PRESSURE: 118 MMHG | TEMPERATURE: 98.6 F | HEART RATE: 80 BPM | RESPIRATION RATE: 18 BRPM | DIASTOLIC BLOOD PRESSURE: 70 MMHG

## 2021-12-03 PROCEDURE — A6257 TRANSPARENT FILM <= 16 SQ IN: HCPCS

## 2021-12-03 PROCEDURE — 400013 HH SOC

## 2021-12-03 PROCEDURE — A6222 GAUZE <=16 IN NO W/SAL W/O B: HCPCS

## 2021-12-03 PROCEDURE — A9270 NON-COVERED ITEM OR SERVICE: HCPCS

## 2021-12-03 PROCEDURE — 3331090001 HH PPS REVENUE CREDIT

## 2021-12-03 PROCEDURE — 400018 HH-NO PAY CLAIM PROCEDURE

## 2021-12-03 PROCEDURE — 3331090002 HH PPS REVENUE DEBIT

## 2021-12-03 PROCEDURE — A6216 NON-STERILE GAUZE<=16 SQ IN: HCPCS

## 2021-12-03 PROCEDURE — G0299 HHS/HOSPICE OF RN EA 15 MIN: HCPCS

## 2021-12-04 PROCEDURE — 3331090001 HH PPS REVENUE CREDIT

## 2021-12-04 PROCEDURE — 3331090002 HH PPS REVENUE DEBIT

## 2021-12-04 NOTE — PROGRESS NOTES
Physician Progress Note      PATIENT:               Diana Marc  CSN #:                  [id number]  :                       1960  ADMIT DATE:       2021 11:28 PM  [address] [place] DATE:        2021 1:05 PM  RESPONDING  PROVIDER #:        Kevin Cavazos MD          QUERY TEXT:    Patient admitted with Septic arthritis of Left Knee. Per Op note dated  with documentation of debridement. To accurately reflect the procedure performed please document if debridement was excisional or nonexcisional and the deepest depth of tissue removed as down to and including: The medical record reflects the following:  Risk Factors: osteoarthritis status post right TKA 3/2021, gout, CKD 2, morbid obesity  Clinical Indicators: Septic arthritis of Knee  Treatment: - OR- EXTREMITY IRRIGATION AND DEBRIDEMENT LEFT KNEE and poly swap of tibial component  Options provided:  -- Nonexcisional debridement of skin  -- Excisional debridement of skin  -- Nonexcisional debridement of subcutaneous tissue  -- Excisional debridement of subcutaneous tissue  -- Nonexcisional debridement of fascia  -- Excisional debridement of fascia  -- Nonexcisional debridement of muscle  -- Excisional debridement of muscle  -- Nonexcisional debridement of bone  -- Excisional debridement of bone  -- Other - I will add my own diagnosis  -- Disagree - Not applicable / Not valid  -- Disagree - Clinically unable to determine / Unknown  -- Refer to Clinical Documentation Reviewer    PROVIDER RESPONSE TEXT:    Excisional debridement of bone of Left Knee was performed during procedure on 21.     Query created by: terrance godoy on 2021 1:12 PM      Electronically signed by:  Kevin Cavazos MD 2021 8:20 AM

## 2021-12-05 PROCEDURE — 3331090002 HH PPS REVENUE DEBIT

## 2021-12-05 PROCEDURE — 3331090001 HH PPS REVENUE CREDIT

## 2021-12-06 ENCOUNTER — HOME CARE VISIT (OUTPATIENT)
Dept: SCHEDULING | Facility: HOME HEALTH | Age: 61
End: 2021-12-06
Payer: MEDICARE

## 2021-12-06 ENCOUNTER — HOSPITAL ENCOUNTER (OUTPATIENT)
Dept: LAB | Age: 61
Discharge: HOME | End: 2021-12-06
Payer: MEDICARE

## 2021-12-06 VITALS
DIASTOLIC BLOOD PRESSURE: 88 MMHG | SYSTOLIC BLOOD PRESSURE: 126 MMHG | OXYGEN SATURATION: 97 % | TEMPERATURE: 98.1 F | HEART RATE: 78 BPM | RESPIRATION RATE: 19 BRPM

## 2021-12-06 VITALS
SYSTOLIC BLOOD PRESSURE: 126 MMHG | OXYGEN SATURATION: 97 % | RESPIRATION RATE: 19 BRPM | HEART RATE: 78 BPM | TEMPERATURE: 98.1 F | DIASTOLIC BLOOD PRESSURE: 88 MMHG

## 2021-12-06 LAB
ALBUMIN SERPL-MCNC: 2.7 G/DL (ref 3.2–4.6)
ALBUMIN/GLOB SERPL: 0.6 {RATIO} (ref 1.2–3.5)
ALP SERPL-CCNC: 69 U/L (ref 50–136)
ALT SERPL-CCNC: 25 U/L (ref 12–65)
AST SERPL-CCNC: 12 U/L (ref 15–37)
BASOPHILS # BLD: 0.1 K/UL (ref 0–0.2)
BASOPHILS NFR BLD: 1 % (ref 0–2)
BILIRUB DIRECT SERPL-MCNC: <0.1 MG/DL
BILIRUB SERPL-MCNC: 0.2 MG/DL (ref 0.2–1.1)
CREAT SERPL-MCNC: 1.12 MG/DL (ref 0.8–1.5)
CRP SERPL-MCNC: 6.2 MG/DL (ref 0–0.9)
DIFFERENTIAL METHOD BLD: ABNORMAL
EOSINOPHIL # BLD: 0.1 K/UL (ref 0–0.8)
EOSINOPHIL NFR BLD: 1 % (ref 0.5–7.8)
ERYTHROCYTE [DISTWIDTH] IN BLOOD BY AUTOMATED COUNT: 13.6 % (ref 11.9–14.6)
GLOBULIN SER CALC-MCNC: 4.9 G/DL (ref 2.3–3.5)
HCT VFR BLD AUTO: 35.9 % (ref 41.1–50.3)
HGB BLD-MCNC: 11.3 G/DL (ref 13.6–17.2)
IMM GRANULOCYTES # BLD AUTO: 0.1 K/UL (ref 0–0.5)
IMM GRANULOCYTES NFR BLD AUTO: 1 % (ref 0–5)
LYMPHOCYTES # BLD: 2.2 K/UL (ref 0.5–4.6)
LYMPHOCYTES NFR BLD: 24 % (ref 13–44)
MCH RBC QN AUTO: 30.6 PG (ref 26.1–32.9)
MCHC RBC AUTO-ENTMCNC: 31.5 G/DL (ref 31.4–35)
MCV RBC AUTO: 97.3 FL (ref 79.6–97.8)
MONOCYTES # BLD: 0.7 K/UL (ref 0.1–1.3)
MONOCYTES NFR BLD: 7 % (ref 4–12)
NEUTS SEG # BLD: 6 K/UL (ref 1.7–8.2)
NEUTS SEG NFR BLD: 65 % (ref 43–78)
NRBC # BLD: 0 K/UL (ref 0–0.2)
PLATELET # BLD AUTO: 405 K/UL (ref 150–450)
PMV BLD AUTO: 11.1 FL (ref 9.4–12.3)
PROT SERPL-MCNC: 7.6 G/DL (ref 6.3–8.2)
RBC # BLD AUTO: 3.69 M/UL (ref 4.23–5.6)
WBC # BLD AUTO: 9.2 K/UL (ref 4.3–11.1)

## 2021-12-06 PROCEDURE — G0299 HHS/HOSPICE OF RN EA 15 MIN: HCPCS

## 2021-12-06 PROCEDURE — 3331090001 HH PPS REVENUE CREDIT

## 2021-12-06 PROCEDURE — 86140 C-REACTIVE PROTEIN: CPT

## 2021-12-06 PROCEDURE — 3331090002 HH PPS REVENUE DEBIT

## 2021-12-06 PROCEDURE — G0152 HHCP-SERV OF OT,EA 15 MIN: HCPCS

## 2021-12-06 PROCEDURE — A6216 NON-STERILE GAUZE<=16 SQ IN: HCPCS

## 2021-12-06 PROCEDURE — 80076 HEPATIC FUNCTION PANEL: CPT

## 2021-12-06 PROCEDURE — A6259 TRANSPARENT FILM > 48 SQ IN: HCPCS

## 2021-12-06 PROCEDURE — A6223 GAUZE >16<=48 NO W/SAL W/O B: HCPCS

## 2021-12-06 PROCEDURE — 85025 COMPLETE CBC W/AUTO DIFF WBC: CPT

## 2021-12-06 PROCEDURE — A6260 WOUND CLEANSER ANY TYPE/SIZE: HCPCS

## 2021-12-06 PROCEDURE — 82565 ASSAY OF CREATININE: CPT

## 2021-12-07 ENCOUNTER — HOME CARE VISIT (OUTPATIENT)
Dept: SCHEDULING | Facility: HOME HEALTH | Age: 61
End: 2021-12-07
Payer: MEDICARE

## 2021-12-07 VITALS
OXYGEN SATURATION: 98 % | SYSTOLIC BLOOD PRESSURE: 136 MMHG | DIASTOLIC BLOOD PRESSURE: 84 MMHG | TEMPERATURE: 97 F | HEART RATE: 81 BPM | RESPIRATION RATE: 18 BRPM

## 2021-12-07 PROCEDURE — G0151 HHCP-SERV OF PT,EA 15 MIN: HCPCS

## 2021-12-07 PROCEDURE — 3331090001 HH PPS REVENUE CREDIT

## 2021-12-07 PROCEDURE — 3331090002 HH PPS REVENUE DEBIT

## 2021-12-08 PROCEDURE — 3331090002 HH PPS REVENUE DEBIT

## 2021-12-08 PROCEDURE — 3331090001 HH PPS REVENUE CREDIT

## 2021-12-09 ENCOUNTER — HOME CARE VISIT (OUTPATIENT)
Dept: SCHEDULING | Facility: HOME HEALTH | Age: 61
End: 2021-12-09
Payer: MEDICARE

## 2021-12-09 VITALS
OXYGEN SATURATION: 98 % | HEART RATE: 76 BPM | SYSTOLIC BLOOD PRESSURE: 132 MMHG | RESPIRATION RATE: 17 BRPM | DIASTOLIC BLOOD PRESSURE: 84 MMHG | TEMPERATURE: 98.8 F

## 2021-12-09 VITALS
RESPIRATION RATE: 18 BRPM | OXYGEN SATURATION: 98 % | SYSTOLIC BLOOD PRESSURE: 134 MMHG | TEMPERATURE: 98.3 F | DIASTOLIC BLOOD PRESSURE: 78 MMHG | HEART RATE: 78 BPM

## 2021-12-09 PROCEDURE — G0299 HHS/HOSPICE OF RN EA 15 MIN: HCPCS

## 2021-12-09 PROCEDURE — G0151 HHCP-SERV OF PT,EA 15 MIN: HCPCS

## 2021-12-09 PROCEDURE — 3331090001 HH PPS REVENUE CREDIT

## 2021-12-09 PROCEDURE — 3331090002 HH PPS REVENUE DEBIT

## 2021-12-10 PROCEDURE — 3331090001 HH PPS REVENUE CREDIT

## 2021-12-10 PROCEDURE — 3331090002 HH PPS REVENUE DEBIT

## 2021-12-11 PROCEDURE — 3331090001 HH PPS REVENUE CREDIT

## 2021-12-11 PROCEDURE — 3331090002 HH PPS REVENUE DEBIT

## 2021-12-12 PROCEDURE — 3331090001 HH PPS REVENUE CREDIT

## 2021-12-12 PROCEDURE — 3331090002 HH PPS REVENUE DEBIT

## 2021-12-13 ENCOUNTER — HOSPITAL ENCOUNTER (OUTPATIENT)
Dept: LAB | Age: 61
Discharge: HOME | End: 2021-12-13
Payer: MEDICARE

## 2021-12-13 ENCOUNTER — HOME CARE VISIT (OUTPATIENT)
Dept: SCHEDULING | Facility: HOME HEALTH | Age: 61
End: 2021-12-13
Payer: MEDICARE

## 2021-12-13 VITALS
TEMPERATURE: 98 F | RESPIRATION RATE: 18 BRPM | HEART RATE: 80 BPM | DIASTOLIC BLOOD PRESSURE: 84 MMHG | SYSTOLIC BLOOD PRESSURE: 140 MMHG | OXYGEN SATURATION: 96 %

## 2021-12-13 LAB
ALBUMIN SERPL-MCNC: 2.8 G/DL (ref 3.2–4.6)
ALBUMIN/GLOB SERPL: 0.6 {RATIO} (ref 1.2–3.5)
ALP SERPL-CCNC: 80 U/L (ref 50–136)
ALT SERPL-CCNC: 16 U/L (ref 12–65)
AST SERPL-CCNC: 8 U/L (ref 15–37)
BASOPHILS # BLD: 0.1 K/UL (ref 0–0.2)
BASOPHILS NFR BLD: 1 % (ref 0–2)
BILIRUB DIRECT SERPL-MCNC: <0.1 MG/DL
BILIRUB SERPL-MCNC: 0.3 MG/DL (ref 0.2–1.1)
CREAT SERPL-MCNC: 1.2 MG/DL (ref 0.8–1.5)
CRP SERPL-MCNC: 2.6 MG/DL (ref 0–0.9)
DIFFERENTIAL METHOD BLD: ABNORMAL
EOSINOPHIL # BLD: 0.2 K/UL (ref 0–0.8)
EOSINOPHIL NFR BLD: 2 % (ref 0.5–7.8)
ERYTHROCYTE [DISTWIDTH] IN BLOOD BY AUTOMATED COUNT: 13.1 % (ref 11.9–14.6)
GLOBULIN SER CALC-MCNC: 5 G/DL (ref 2.3–3.5)
HCT VFR BLD AUTO: 36.9 % (ref 41.1–50.3)
HGB BLD-MCNC: 11.5 G/DL (ref 13.6–17.2)
IMM GRANULOCYTES # BLD AUTO: 0 K/UL (ref 0–0.5)
IMM GRANULOCYTES NFR BLD AUTO: 0 % (ref 0–5)
LYMPHOCYTES # BLD: 2 K/UL (ref 0.5–4.6)
LYMPHOCYTES NFR BLD: 25 % (ref 13–44)
MCH RBC QN AUTO: 29.9 PG (ref 26.1–32.9)
MCHC RBC AUTO-ENTMCNC: 31.2 G/DL (ref 31.4–35)
MCV RBC AUTO: 96.1 FL (ref 79.6–97.8)
MONOCYTES # BLD: 0.7 K/UL (ref 0.1–1.3)
MONOCYTES NFR BLD: 9 % (ref 4–12)
NEUTS SEG # BLD: 4.9 K/UL (ref 1.7–8.2)
NEUTS SEG NFR BLD: 62 % (ref 43–78)
NRBC # BLD: 0 K/UL (ref 0–0.2)
PLATELET # BLD AUTO: 336 K/UL (ref 150–450)
PMV BLD AUTO: 11.6 FL (ref 9.4–12.3)
PROT SERPL-MCNC: 7.8 G/DL (ref 6.3–8.2)
RBC # BLD AUTO: 3.84 M/UL (ref 4.23–5.6)
WBC # BLD AUTO: 7.8 K/UL (ref 4.3–11.1)

## 2021-12-13 PROCEDURE — 80076 HEPATIC FUNCTION PANEL: CPT

## 2021-12-13 PROCEDURE — 85025 COMPLETE CBC W/AUTO DIFF WBC: CPT

## 2021-12-13 PROCEDURE — 86140 C-REACTIVE PROTEIN: CPT

## 2021-12-13 PROCEDURE — 3331090001 HH PPS REVENUE CREDIT

## 2021-12-13 PROCEDURE — 82565 ASSAY OF CREATININE: CPT

## 2021-12-13 PROCEDURE — G0299 HHS/HOSPICE OF RN EA 15 MIN: HCPCS

## 2021-12-13 PROCEDURE — 3331090002 HH PPS REVENUE DEBIT

## 2021-12-14 ENCOUNTER — HOME CARE VISIT (OUTPATIENT)
Dept: SCHEDULING | Facility: HOME HEALTH | Age: 61
End: 2021-12-14
Payer: MEDICARE

## 2021-12-14 VITALS
TEMPERATURE: 98 F | RESPIRATION RATE: 18 BRPM | DIASTOLIC BLOOD PRESSURE: 76 MMHG | SYSTOLIC BLOOD PRESSURE: 128 MMHG | OXYGEN SATURATION: 98 % | HEART RATE: 77 BPM

## 2021-12-14 PROCEDURE — 3331090001 HH PPS REVENUE CREDIT

## 2021-12-14 PROCEDURE — G0151 HHCP-SERV OF PT,EA 15 MIN: HCPCS

## 2021-12-14 PROCEDURE — 3331090002 HH PPS REVENUE DEBIT

## 2021-12-15 PROCEDURE — 3331090001 HH PPS REVENUE CREDIT

## 2021-12-15 PROCEDURE — 3331090002 HH PPS REVENUE DEBIT

## 2021-12-16 ENCOUNTER — HOME CARE VISIT (OUTPATIENT)
Dept: SCHEDULING | Facility: HOME HEALTH | Age: 61
End: 2021-12-16
Payer: MEDICARE

## 2021-12-16 VITALS
TEMPERATURE: 98.7 F | HEART RATE: 90 BPM | SYSTOLIC BLOOD PRESSURE: 136 MMHG | DIASTOLIC BLOOD PRESSURE: 78 MMHG | OXYGEN SATURATION: 99 %

## 2021-12-16 PROCEDURE — 3331090002 HH PPS REVENUE DEBIT

## 2021-12-16 PROCEDURE — 3331090001 HH PPS REVENUE CREDIT

## 2021-12-16 PROCEDURE — G0299 HHS/HOSPICE OF RN EA 15 MIN: HCPCS

## 2021-12-17 ENCOUNTER — HOME CARE VISIT (OUTPATIENT)
Dept: SCHEDULING | Facility: HOME HEALTH | Age: 61
End: 2021-12-17
Payer: MEDICARE

## 2021-12-17 PROCEDURE — 3331090001 HH PPS REVENUE CREDIT

## 2021-12-17 PROCEDURE — 3331090002 HH PPS REVENUE DEBIT

## 2021-12-17 PROCEDURE — G0151 HHCP-SERV OF PT,EA 15 MIN: HCPCS

## 2021-12-18 VITALS
RESPIRATION RATE: 18 BRPM | TEMPERATURE: 97 F | SYSTOLIC BLOOD PRESSURE: 136 MMHG | HEART RATE: 77 BPM | DIASTOLIC BLOOD PRESSURE: 82 MMHG | OXYGEN SATURATION: 98 %

## 2021-12-18 PROCEDURE — 3331090001 HH PPS REVENUE CREDIT

## 2021-12-18 PROCEDURE — 3331090002 HH PPS REVENUE DEBIT

## 2021-12-19 PROCEDURE — 3331090001 HH PPS REVENUE CREDIT

## 2021-12-19 PROCEDURE — 3331090002 HH PPS REVENUE DEBIT

## 2021-12-20 ENCOUNTER — HOME CARE VISIT (OUTPATIENT)
Dept: SCHEDULING | Facility: HOME HEALTH | Age: 61
End: 2021-12-20
Payer: MEDICARE

## 2021-12-20 ENCOUNTER — HOSPITAL ENCOUNTER (OUTPATIENT)
Dept: LAB | Age: 61
Discharge: HOME | End: 2021-12-20
Payer: MEDICARE

## 2021-12-20 VITALS
TEMPERATURE: 98.3 F | SYSTOLIC BLOOD PRESSURE: 126 MMHG | DIASTOLIC BLOOD PRESSURE: 72 MMHG | HEART RATE: 70 BPM | RESPIRATION RATE: 19 BRPM | OXYGEN SATURATION: 97 %

## 2021-12-20 LAB
ALBUMIN SERPL-MCNC: 3 G/DL (ref 3.2–4.6)
ALBUMIN/GLOB SERPL: 0.7 {RATIO} (ref 1.2–3.5)
ALP SERPL-CCNC: 82 U/L (ref 50–136)
ALT SERPL-CCNC: 15 U/L (ref 12–65)
AST SERPL-CCNC: 9 U/L (ref 15–37)
BASOPHILS # BLD: 0.1 K/UL (ref 0–0.2)
BASOPHILS NFR BLD: 1 % (ref 0–2)
BILIRUB DIRECT SERPL-MCNC: <0.1 MG/DL
BILIRUB SERPL-MCNC: 0.2 MG/DL (ref 0.2–1.1)
CREAT SERPL-MCNC: 1.14 MG/DL (ref 0.8–1.5)
CRP SERPL-MCNC: 1.5 MG/DL (ref 0–0.9)
DIFFERENTIAL METHOD BLD: ABNORMAL
EOSINOPHIL # BLD: 0.3 K/UL (ref 0–0.8)
EOSINOPHIL NFR BLD: 4 % (ref 0.5–7.8)
ERYTHROCYTE [DISTWIDTH] IN BLOOD BY AUTOMATED COUNT: 13.1 % (ref 11.9–14.6)
GLOBULIN SER CALC-MCNC: 4.5 G/DL (ref 2.3–3.5)
HCT VFR BLD AUTO: 36.9 % (ref 41.1–50.3)
HGB BLD-MCNC: 11.6 G/DL (ref 13.6–17.2)
IMM GRANULOCYTES # BLD AUTO: 0 K/UL (ref 0–0.5)
IMM GRANULOCYTES NFR BLD AUTO: 0 % (ref 0–5)
LYMPHOCYTES # BLD: 1.8 K/UL (ref 0.5–4.6)
LYMPHOCYTES NFR BLD: 27 % (ref 13–44)
MCH RBC QN AUTO: 29.4 PG (ref 26.1–32.9)
MCHC RBC AUTO-ENTMCNC: 31.4 G/DL (ref 31.4–35)
MCV RBC AUTO: 93.7 FL (ref 79.6–97.8)
MONOCYTES # BLD: 0.5 K/UL (ref 0.1–1.3)
MONOCYTES NFR BLD: 8 % (ref 4–12)
NEUTS SEG # BLD: 3.8 K/UL (ref 1.7–8.2)
NEUTS SEG NFR BLD: 59 % (ref 43–78)
NRBC # BLD: 0 K/UL (ref 0–0.2)
PLATELET # BLD AUTO: 217 K/UL (ref 150–450)
PMV BLD AUTO: 11.9 FL (ref 9.4–12.3)
PROT SERPL-MCNC: 7.5 G/DL (ref 6.3–8.2)
RBC # BLD AUTO: 3.94 M/UL (ref 4.23–5.6)
WBC # BLD AUTO: 6.5 K/UL (ref 4.3–11.1)

## 2021-12-20 PROCEDURE — 3331090001 HH PPS REVENUE CREDIT

## 2021-12-20 PROCEDURE — 80076 HEPATIC FUNCTION PANEL: CPT

## 2021-12-20 PROCEDURE — G0299 HHS/HOSPICE OF RN EA 15 MIN: HCPCS

## 2021-12-20 PROCEDURE — 3331090002 HH PPS REVENUE DEBIT

## 2021-12-20 PROCEDURE — 86140 C-REACTIVE PROTEIN: CPT

## 2021-12-20 PROCEDURE — 85025 COMPLETE CBC W/AUTO DIFF WBC: CPT

## 2021-12-20 PROCEDURE — 82565 ASSAY OF CREATININE: CPT

## 2021-12-21 ENCOUNTER — HOME CARE VISIT (OUTPATIENT)
Dept: HOME HEALTH SERVICES | Facility: HOME HEALTH | Age: 61
End: 2021-12-21
Payer: MEDICARE

## 2021-12-21 PROCEDURE — 3331090001 HH PPS REVENUE CREDIT

## 2021-12-21 PROCEDURE — 3331090002 HH PPS REVENUE DEBIT

## 2021-12-22 PROCEDURE — 3331090001 HH PPS REVENUE CREDIT

## 2021-12-22 PROCEDURE — 3331090002 HH PPS REVENUE DEBIT

## 2021-12-23 ENCOUNTER — HOME CARE VISIT (OUTPATIENT)
Dept: SCHEDULING | Facility: HOME HEALTH | Age: 61
End: 2021-12-23
Payer: MEDICARE

## 2021-12-23 PROCEDURE — G0299 HHS/HOSPICE OF RN EA 15 MIN: HCPCS

## 2021-12-23 PROCEDURE — 3331090001 HH PPS REVENUE CREDIT

## 2021-12-23 PROCEDURE — 3331090002 HH PPS REVENUE DEBIT

## 2021-12-24 VITALS
SYSTOLIC BLOOD PRESSURE: 124 MMHG | TEMPERATURE: 98.2 F | OXYGEN SATURATION: 97 % | HEART RATE: 67 BPM | RESPIRATION RATE: 16 BRPM | DIASTOLIC BLOOD PRESSURE: 76 MMHG

## 2021-12-24 PROCEDURE — 3331090002 HH PPS REVENUE DEBIT

## 2021-12-24 PROCEDURE — 3331090001 HH PPS REVENUE CREDIT

## 2021-12-25 PROCEDURE — 3331090001 HH PPS REVENUE CREDIT

## 2021-12-25 PROCEDURE — 3331090002 HH PPS REVENUE DEBIT

## 2021-12-26 PROCEDURE — 3331090001 HH PPS REVENUE CREDIT

## 2021-12-26 PROCEDURE — 3331090002 HH PPS REVENUE DEBIT

## 2021-12-27 ENCOUNTER — HOME CARE VISIT (OUTPATIENT)
Dept: SCHEDULING | Facility: HOME HEALTH | Age: 61
End: 2021-12-27
Payer: MEDICARE

## 2021-12-27 ENCOUNTER — HOSPITAL ENCOUNTER (OUTPATIENT)
Dept: LAB | Age: 61
Discharge: HOME | End: 2021-12-27
Payer: MEDICARE

## 2021-12-27 LAB
ALBUMIN SERPL-MCNC: 3 G/DL (ref 3.2–4.6)
ALBUMIN/GLOB SERPL: 0.8 {RATIO} (ref 1.2–3.5)
ALP SERPL-CCNC: 72 U/L (ref 50–136)
ALT SERPL-CCNC: 25 U/L (ref 12–65)
AST SERPL-CCNC: 16 U/L (ref 15–37)
BASOPHILS # BLD: 0 K/UL (ref 0–0.2)
BASOPHILS NFR BLD: 1 % (ref 0–2)
BILIRUB DIRECT SERPL-MCNC: <0.1 MG/DL
BILIRUB SERPL-MCNC: 0.2 MG/DL (ref 0.2–1.1)
CREAT SERPL-MCNC: 0.88 MG/DL (ref 0.8–1.5)
CRP SERPL-MCNC: 1.9 MG/DL (ref 0–0.9)
DIFFERENTIAL METHOD BLD: ABNORMAL
EOSINOPHIL # BLD: 0.1 K/UL (ref 0–0.8)
EOSINOPHIL NFR BLD: 3 % (ref 0.5–7.8)
ERYTHROCYTE [DISTWIDTH] IN BLOOD BY AUTOMATED COUNT: 13.8 % (ref 11.9–14.6)
GLOBULIN SER CALC-MCNC: 4 G/DL (ref 2.3–3.5)
HCT VFR BLD AUTO: 37.2 % (ref 41.1–50.3)
HGB BLD-MCNC: 11.9 G/DL (ref 13.6–17.2)
IMM GRANULOCYTES # BLD AUTO: 0 K/UL (ref 0–0.5)
IMM GRANULOCYTES NFR BLD AUTO: 0 % (ref 0–5)
LYMPHOCYTES # BLD: 2.1 K/UL (ref 0.5–4.6)
LYMPHOCYTES NFR BLD: 41 % (ref 13–44)
MCH RBC QN AUTO: 30.7 PG (ref 26.1–32.9)
MCHC RBC AUTO-ENTMCNC: 32 G/DL (ref 31.4–35)
MCV RBC AUTO: 96.1 FL (ref 79.6–97.8)
MONOCYTES # BLD: 0.5 K/UL (ref 0.1–1.3)
MONOCYTES NFR BLD: 9 % (ref 4–12)
NEUTS SEG # BLD: 2.3 K/UL (ref 1.7–8.2)
NEUTS SEG NFR BLD: 46 % (ref 43–78)
NRBC # BLD: 0 K/UL (ref 0–0.2)
PLATELET # BLD AUTO: 183 K/UL (ref 150–450)
PMV BLD AUTO: 11.9 FL (ref 9.4–12.3)
PROT SERPL-MCNC: 7 G/DL (ref 6.3–8.2)
RBC # BLD AUTO: 3.87 M/UL (ref 4.23–5.6)
WBC # BLD AUTO: 5 K/UL (ref 4.3–11.1)

## 2021-12-27 PROCEDURE — 3331090002 HH PPS REVENUE DEBIT

## 2021-12-27 PROCEDURE — G0299 HHS/HOSPICE OF RN EA 15 MIN: HCPCS

## 2021-12-27 PROCEDURE — 80076 HEPATIC FUNCTION PANEL: CPT

## 2021-12-27 PROCEDURE — 3331090001 HH PPS REVENUE CREDIT

## 2021-12-27 PROCEDURE — 86140 C-REACTIVE PROTEIN: CPT

## 2021-12-27 PROCEDURE — 82565 ASSAY OF CREATININE: CPT

## 2021-12-27 PROCEDURE — 85025 COMPLETE CBC W/AUTO DIFF WBC: CPT

## 2021-12-28 ENCOUNTER — HOSPITAL ENCOUNTER (OUTPATIENT)
Dept: MEDSURG UNIT | Age: 61
Discharge: HOME | End: 2021-12-28
Payer: MEDICARE

## 2021-12-28 VITALS
DIASTOLIC BLOOD PRESSURE: 76 MMHG | OXYGEN SATURATION: 96 % | SYSTOLIC BLOOD PRESSURE: 132 MMHG | TEMPERATURE: 98.4 F | HEART RATE: 76 BPM | RESPIRATION RATE: 17 BRPM

## 2021-12-28 PROCEDURE — C1751 CATH, INF, PER/CENT/MIDLINE: HCPCS

## 2021-12-28 PROCEDURE — 3331090002 HH PPS REVENUE DEBIT

## 2021-12-28 PROCEDURE — 36573 INSJ PICC RS&I 5 YR+: CPT | Performed by: INTERNAL MEDICINE

## 2021-12-28 PROCEDURE — 3331090001 HH PPS REVENUE CREDIT

## 2021-12-28 NOTE — PROGRESS NOTES
PICC Placement Note    PRE-PROCEDURE VERIFICATION  Correct Procedure: yes. Time out completed with assistant Puja Hanna RN, VAT and all persons present in agreement with time out. Correct Site:  yes  Temperature:  , Temperature Source:    No results for input(s): BUN, CREA, PLT, INR, WBC, PLTEXT, INREXT in the last 72 hours. No lab exists for component: APTHR  Allergies: Patient has no active allergies. Education materials for [facility] given to patient or family. PROCEDURE DETAIL  A single lumen PICC line was started for antibiotic therapy. The following documentation is in addition to the PICC properties in the lines/airways flowsheet :  Lot #: [id number]  xylocaine used: yes  Mid-Arm Circumference: 39 (cm)  Internal Catheter Length: 45 (cm)  Internal Catheter Total Length: 50 (cm)  Vein Selection for PICC:left brachial  Central Line Bundle followed yes  Complication Related to Insertion: none  Both the insertion guidewire and ECG guidewire were removed intact all ports have positive blood return and were flush well with normal saline. The location of the tip of the PICC is verified using ECG technology. The tip is in the SVC per ECG reading. See image below.      Line is okay to use: yes      Lior Dave RN, VAT

## 2021-12-29 PROCEDURE — 3331090002 HH PPS REVENUE DEBIT

## 2021-12-29 PROCEDURE — 3331090001 HH PPS REVENUE CREDIT

## 2021-12-30 ENCOUNTER — HOME CARE VISIT (OUTPATIENT)
Dept: SCHEDULING | Facility: HOME HEALTH | Age: 61
End: 2021-12-30
Payer: MEDICARE

## 2021-12-30 VITALS
SYSTOLIC BLOOD PRESSURE: 132 MMHG | DIASTOLIC BLOOD PRESSURE: 78 MMHG | OXYGEN SATURATION: 97 % | TEMPERATURE: 98.8 F | HEART RATE: 84 BPM | RESPIRATION RATE: 16 BRPM

## 2021-12-30 VITALS
HEART RATE: 77 BPM | SYSTOLIC BLOOD PRESSURE: 134 MMHG | DIASTOLIC BLOOD PRESSURE: 86 MMHG | OXYGEN SATURATION: 98 % | TEMPERATURE: 98 F | RESPIRATION RATE: 18 BRPM

## 2021-12-30 PROCEDURE — 3331090001 HH PPS REVENUE CREDIT

## 2021-12-30 PROCEDURE — 3331090002 HH PPS REVENUE DEBIT

## 2021-12-30 PROCEDURE — G0151 HHCP-SERV OF PT,EA 15 MIN: HCPCS

## 2021-12-30 PROCEDURE — G0299 HHS/HOSPICE OF RN EA 15 MIN: HCPCS

## 2021-12-31 PROCEDURE — 3331090001 HH PPS REVENUE CREDIT

## 2021-12-31 PROCEDURE — 3331090002 HH PPS REVENUE DEBIT

## 2022-01-01 PROCEDURE — 3331090002 HH PPS REVENUE DEBIT

## 2022-01-01 PROCEDURE — 3331090001 HH PPS REVENUE CREDIT

## 2022-01-02 PROCEDURE — 3331090001 HH PPS REVENUE CREDIT

## 2022-01-02 PROCEDURE — 3331090002 HH PPS REVENUE DEBIT

## 2022-01-02 PROCEDURE — 400018 HH-NO PAY CLAIM PROCEDURE

## 2022-01-03 PROCEDURE — 3331090002 HH PPS REVENUE DEBIT

## 2022-01-03 PROCEDURE — 3331090001 HH PPS REVENUE CREDIT

## 2022-01-04 ENCOUNTER — HOSPITAL ENCOUNTER (OUTPATIENT)
Dept: LAB | Age: 62
Discharge: HOME | End: 2022-01-04
Payer: MEDICARE

## 2022-01-04 ENCOUNTER — HOME CARE VISIT (OUTPATIENT)
Dept: SCHEDULING | Facility: HOME HEALTH | Age: 62
End: 2022-01-04
Payer: MEDICARE

## 2022-01-04 LAB
ALBUMIN SERPL-MCNC: 3.3 G/DL (ref 3.2–4.6)
ALBUMIN/GLOB SERPL: 0.9 {RATIO} (ref 1.2–3.5)
ALP SERPL-CCNC: 89 U/L (ref 50–136)
ALT SERPL-CCNC: 24 U/L (ref 12–65)
AST SERPL-CCNC: 16 U/L (ref 15–37)
BASOPHILS # BLD: 0.1 K/UL (ref 0–0.2)
BASOPHILS NFR BLD: 1 % (ref 0–2)
BILIRUB DIRECT SERPL-MCNC: <0.1 MG/DL
BILIRUB SERPL-MCNC: 0.2 MG/DL (ref 0.2–1.1)
CREAT SERPL-MCNC: 0.98 MG/DL (ref 0.8–1.5)
CRP SERPL-MCNC: 1 MG/DL (ref 0–0.9)
DIFFERENTIAL METHOD BLD: ABNORMAL
EOSINOPHIL # BLD: 0.3 K/UL (ref 0–0.8)
EOSINOPHIL NFR BLD: 3 % (ref 0.5–7.8)
ERYTHROCYTE [DISTWIDTH] IN BLOOD BY AUTOMATED COUNT: 14 % (ref 11.9–14.6)
GLOBULIN SER CALC-MCNC: 3.6 G/DL (ref 2.3–3.5)
HCT VFR BLD AUTO: 37.9 % (ref 41.1–50.3)
HGB BLD-MCNC: 12.4 G/DL (ref 13.6–17.2)
IMM GRANULOCYTES # BLD AUTO: 0 K/UL (ref 0–0.5)
IMM GRANULOCYTES NFR BLD AUTO: 0 % (ref 0–5)
LYMPHOCYTES # BLD: 2.4 K/UL (ref 0.5–4.6)
LYMPHOCYTES NFR BLD: 26 % (ref 13–44)
MCH RBC QN AUTO: 31.4 PG (ref 26.1–32.9)
MCHC RBC AUTO-ENTMCNC: 32.7 G/DL (ref 31.4–35)
MCV RBC AUTO: 95.9 FL (ref 79.6–97.8)
MONOCYTES # BLD: 1 K/UL (ref 0.1–1.3)
MONOCYTES NFR BLD: 11 % (ref 4–12)
NEUTS SEG # BLD: 5.4 K/UL (ref 1.7–8.2)
NEUTS SEG NFR BLD: 59 % (ref 43–78)
NRBC # BLD: 0 K/UL (ref 0–0.2)
PLATELET # BLD AUTO: 202 K/UL (ref 150–450)
PMV BLD AUTO: 12.6 FL (ref 9.4–12.3)
PROT SERPL-MCNC: 6.9 G/DL (ref 6.3–8.2)
RBC # BLD AUTO: 3.95 M/UL (ref 4.23–5.6)
WBC # BLD AUTO: 9.1 K/UL (ref 4.3–11.1)

## 2022-01-04 PROCEDURE — 82565 ASSAY OF CREATININE: CPT

## 2022-01-04 PROCEDURE — 400013 HH SOC

## 2022-01-04 PROCEDURE — 85025 COMPLETE CBC W/AUTO DIFF WBC: CPT

## 2022-01-04 PROCEDURE — 86140 C-REACTIVE PROTEIN: CPT

## 2022-01-04 PROCEDURE — 3331090002 HH PPS REVENUE DEBIT

## 2022-01-04 PROCEDURE — 3331090001 HH PPS REVENUE CREDIT

## 2022-01-04 PROCEDURE — 80076 HEPATIC FUNCTION PANEL: CPT

## 2022-01-04 PROCEDURE — G0299 HHS/HOSPICE OF RN EA 15 MIN: HCPCS

## 2022-01-05 VITALS
HEART RATE: 78 BPM | TEMPERATURE: 98.3 F | OXYGEN SATURATION: 97 % | SYSTOLIC BLOOD PRESSURE: 124 MMHG | DIASTOLIC BLOOD PRESSURE: 80 MMHG | RESPIRATION RATE: 19 BRPM

## 2022-01-05 PROCEDURE — 3331090002 HH PPS REVENUE DEBIT

## 2022-01-05 PROCEDURE — 3331090001 HH PPS REVENUE CREDIT

## 2022-01-06 PROCEDURE — 3331090002 HH PPS REVENUE DEBIT

## 2022-01-06 PROCEDURE — 3331090001 HH PPS REVENUE CREDIT

## 2022-01-07 PROCEDURE — 3331090001 HH PPS REVENUE CREDIT

## 2022-01-07 PROCEDURE — 3331090002 HH PPS REVENUE DEBIT

## 2022-01-08 PROCEDURE — 3331090002 HH PPS REVENUE DEBIT

## 2022-01-08 PROCEDURE — 3331090001 HH PPS REVENUE CREDIT

## 2022-01-09 PROCEDURE — 3331090002 HH PPS REVENUE DEBIT

## 2022-01-09 PROCEDURE — 3331090001 HH PPS REVENUE CREDIT

## 2022-01-10 PROCEDURE — 3331090002 HH PPS REVENUE DEBIT

## 2022-01-10 PROCEDURE — 3331090001 HH PPS REVENUE CREDIT

## 2022-01-11 ENCOUNTER — HOME CARE VISIT (OUTPATIENT)
Dept: SCHEDULING | Facility: HOME HEALTH | Age: 62
End: 2022-01-11
Payer: MEDICARE

## 2022-01-11 VITALS
RESPIRATION RATE: 15 BRPM | OXYGEN SATURATION: 98 % | SYSTOLIC BLOOD PRESSURE: 131 MMHG | HEART RATE: 76 BPM | DIASTOLIC BLOOD PRESSURE: 73 MMHG | TEMPERATURE: 98.3 F

## 2022-01-11 PROCEDURE — 3331090002 HH PPS REVENUE DEBIT

## 2022-01-11 PROCEDURE — G0299 HHS/HOSPICE OF RN EA 15 MIN: HCPCS

## 2022-01-11 PROCEDURE — 3331090001 HH PPS REVENUE CREDIT

## 2022-01-24 PROBLEM — M17.12 LOCALIZED OSTEOARTHRITIS OF LEFT KNEE: Status: RESOLVED | Noted: 2021-10-29 | Resolved: 2022-01-24

## 2022-01-24 PROBLEM — M25.562 ACUTE PAIN OF LEFT KNEE: Status: RESOLVED | Noted: 2021-07-15 | Resolved: 2022-01-24

## 2022-01-24 PROBLEM — N18.30 STAGE 3 CHRONIC KIDNEY DISEASE (HCC): Status: RESOLVED | Noted: 2021-07-15 | Resolved: 2022-01-24

## 2022-03-18 PROBLEM — E66.813 OBESITY, CLASS III, BMI 40-49.9 (MORBID OBESITY) (HCC): Status: ACTIVE | Noted: 2019-08-27

## 2022-03-18 PROBLEM — E66.01 OBESITY, CLASS III, BMI 40-49.9 (MORBID OBESITY) (HCC): Status: ACTIVE | Noted: 2019-08-27

## 2022-03-19 PROBLEM — M1A.0720 CHRONIC IDIOPATHIC GOUT INVOLVING TOE OF LEFT FOOT WITHOUT TOPHUS: Status: ACTIVE | Noted: 2021-07-15

## 2022-03-19 PROBLEM — M25.562 LEFT KNEE PAIN: Status: ACTIVE | Noted: 2021-11-27

## 2022-03-19 PROBLEM — M00.9 SEPTIC ARTHRITIS OF KNEE, LEFT (HCC): Status: ACTIVE | Noted: 2021-11-28

## 2022-07-25 ENCOUNTER — OFFICE VISIT (OUTPATIENT)
Dept: FAMILY MEDICINE CLINIC | Facility: CLINIC | Age: 62
End: 2022-07-25
Payer: MEDICARE

## 2022-07-25 ENCOUNTER — NURSE ONLY (OUTPATIENT)
Dept: FAMILY MEDICINE CLINIC | Facility: CLINIC | Age: 62
End: 2022-07-25

## 2022-07-25 VITALS
DIASTOLIC BLOOD PRESSURE: 79 MMHG | WEIGHT: 295.6 LBS | HEART RATE: 76 BPM | HEIGHT: 69 IN | RESPIRATION RATE: 18 BRPM | BODY MASS INDEX: 43.78 KG/M2 | TEMPERATURE: 97.7 F | OXYGEN SATURATION: 96 % | SYSTOLIC BLOOD PRESSURE: 121 MMHG

## 2022-07-25 DIAGNOSIS — E66.01 OBESITY, CLASS III, BMI 40-49.9 (MORBID OBESITY) (HCC): ICD-10-CM

## 2022-07-25 DIAGNOSIS — Z12.5 PROSTATE CANCER SCREENING: ICD-10-CM

## 2022-07-25 DIAGNOSIS — R73.9 HYPERGLYCEMIA: ICD-10-CM

## 2022-07-25 DIAGNOSIS — T84.54XS INFECTION ASSOCIATED WITH INTERNAL LEFT KNEE PROSTHESIS, SEQUELA: ICD-10-CM

## 2022-07-25 DIAGNOSIS — K21.9 GASTROESOPHAGEAL REFLUX DISEASE WITHOUT ESOPHAGITIS: ICD-10-CM

## 2022-07-25 DIAGNOSIS — Z11.4 ENCOUNTER FOR SCREENING FOR HIV: ICD-10-CM

## 2022-07-25 DIAGNOSIS — E78.2 MIXED HYPERLIPIDEMIA: ICD-10-CM

## 2022-07-25 DIAGNOSIS — E78.2 MIXED HYPERLIPIDEMIA: Primary | ICD-10-CM

## 2022-07-25 DIAGNOSIS — M1A.0720 CHRONIC IDIOPATHIC GOUT INVOLVING TOE OF LEFT FOOT WITHOUT TOPHUS: ICD-10-CM

## 2022-07-25 DIAGNOSIS — R06.02 SHORTNESS OF BREATH: ICD-10-CM

## 2022-07-25 DIAGNOSIS — N52.9 ERECTILE DYSFUNCTION, UNSPECIFIED ERECTILE DYSFUNCTION TYPE: ICD-10-CM

## 2022-07-25 PROBLEM — T84.54XA INFECTION OF PROSTHETIC LEFT KNEE JOINT (HCC): Status: ACTIVE | Noted: 2022-07-25

## 2022-07-25 PROBLEM — M25.562 LEFT KNEE PAIN: Status: RESOLVED | Noted: 2021-11-27 | Resolved: 2022-07-25

## 2022-07-25 PROBLEM — M00.9 SEPTIC ARTHRITIS OF KNEE, LEFT (HCC): Status: RESOLVED | Noted: 2021-11-28 | Resolved: 2022-07-25

## 2022-07-25 LAB
CHOLEST SERPL-MCNC: 220 MG/DL
HDLC SERPL-MCNC: 34 MG/DL (ref 40–60)
HDLC SERPL: 6.5 {RATIO}
HIV 1+2 AB+HIV1 P24 AG SERPL QL IA: NONREACTIVE
HIV 1/2 RESULT COMMENT: NORMAL
LDLC SERPL CALC-MCNC: 132.8 MG/DL
PSA SERPL-MCNC: 1 NG/ML
TRIGL SERPL-MCNC: 266 MG/DL (ref 35–150)
URATE SERPL-MCNC: 6.3 MG/DL (ref 2.6–6)
VLDLC SERPL CALC-MCNC: 53.2 MG/DL (ref 6–23)

## 2022-07-25 PROCEDURE — 99214 OFFICE O/P EST MOD 30 MIN: CPT | Performed by: FAMILY MEDICINE

## 2022-07-25 RX ORDER — SILDENAFIL 100 MG/1
100 TABLET, FILM COATED ORAL DAILY PRN
Qty: 30 TABLET | Refills: 2 | Status: SHIPPED | OUTPATIENT
Start: 2022-07-25

## 2022-07-25 RX ORDER — EZETIMIBE 10 MG/1
10 TABLET ORAL DAILY
Qty: 90 TABLET | Refills: 2 | Status: SHIPPED | OUTPATIENT
Start: 2022-07-25 | End: 2022-07-25 | Stop reason: HOSPADM

## 2022-07-25 RX ORDER — ATORVASTATIN CALCIUM 40 MG/1
TABLET, FILM COATED ORAL
Qty: 90 TABLET | Refills: 2 | Status: SHIPPED | OUTPATIENT
Start: 2022-07-25

## 2022-07-25 NOTE — PROGRESS NOTES
[address] [facility] [address], [address]  Phone: [phone] Fax [phone]  [facility]. Priscilla Mathews M.D.  7/25/2022        Lai Angulo. is a 58 y.o. male     HPI:  Patient is seen for Follow-up (6 months, fasting/)  Patient has tolerated the addition of Zetia to his Lipitor. In the spring he was diagnosed with an infection of the left knee prosthesis. He continues to see infectious disease and is currently on an antibiotic twice daily for this. No current fever chills nausea or vomiting. States he was going to the gym regularly during the winter but has not this spring or summer. He has noticed more shortness of breath and fatigue with activity. He has had some weight gain. Had a heart cath in 2019. Additionally patient had colonoscopy and endoscopy last July and September. No breakthrough heartburn or reflux on his pantoprazole. Blood pressure has been controlled. He does notice some difficulty obtaining and maintaining an erection. Does not report any decreased libido. Has 1 episode of nocturia each night. Usually awakens between 3 AM and 5 AM.  If he urinates while sitting on the toilet, he will generally have to urinate once again after standing back up. Does not desire medication for this. ROS:  Denies any chest pain diaphoresis palpitations or tachycardia. No polydipsia or polyphagia or polyuria. No breakthrough dyspepsia or reflux. No abdominal pain. No hematochezia melena diarrhea or constipation. No dysuria or hematuria. Denies any depression.      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 07/15/2021    Osteoarthritis 11/14/2012    Erectile dysfunction 07/25/2022    Infection of prosthetic left knee joint ([facility] [address] 07/25/2022     Resolved Ambulatory Problems     Diagnosis Date Noted    Left knee pain 11/27/2021    Septic arthritis of knee, left ([facility] [address] 11/28/2021     Past Medical History:   Diagnosis Date    Chronic kidney disease 07/12/2021    Gout     Hiatal hernia     History of COVID-19 10/2020    History of EKG 07/12/2021    Hx of cardiovascular stress test 07/13/2021    Hypercholesteremia     Obesity 11/14/2012    Psychiatric disorder     Sleep apnea 11/14/2012        Past Surgical History:   Procedure Laterality Date    Kyara Fee      06/29/2010    CHOLECYSTECTOMY      Dr. Madelyn Rutledge 10/09/2007    COLONOSCOPY  07/14/2021    Dr. Minnette Kayser; benign polyp of the sigmoid colon, diverticulosis of large intestine without perforation    LITHOTRIPSY      righ 03/03/2008 and left 06/30/2008    ORTHOPEDIC SURGERY Left 11/28/2021    Dr. Mejia Seen; irrigation/surgical articular insert revision secondary to septic arthritis    ORTHOPEDIC SURGERY      hand tendon repair    [place] CARDIAC SURG PROCEDURE UNLIST      cath no intervention has seen cardio since cath and states heart is fine     TOTAL KNEE ARTHROPLASTY Left 10/29/2021    Dr. Mejia Seen; total knee arthroplasty    TOTAL KNEE ARTHROPLASTY Right 03/08/2021     [address]  09/14/2021    Dr. Minnette Kayser; hiatal hernia, GERD without esophagitis        Social History     Tobacco Use    Smoking status: Never    Smokeless tobacco: Never   Substance Use Topics    Alcohol use: Yes     Alcohol/week: 1.0 standard drink    Drug use: No       Physical Exam:  Blood pressure 121/79, pulse 76, temperature 97.7 °F (36.5 °C), temperature source Temporal, resp. rate 18, height 5' 9\" (1.753 m), weight 295 lb 9.6 oz (134.1 kg), SpO2 96 %. Pleasant male in no apparent distress.   Affect is appropriate. HEENT grossly normal.  Oral mucosa is moist and intact. No cervical lymphadenopathy or thyromegaly or nodularity. Lungs clear. No JVD or hepatojugular reflux. Cardiovascular regular S1-S2 without murmurs rubs or gallops. Radial pulses 2+. Abdomen is soft, grossly obese, and nontender with positive bowel sounds. No masses palpated. 1+ peripheral edema to the level of the lower calves without cyanosis. Moves all extremities well. Assessment and Plan:   Diagnosis Orders   1. Mixed hyperlipidemia  atorvastatin (LIPITOR) 40 MG tablet    ezetimibe (ZETIA) 10 MG tablet    Lipid Panel      2. Shortness of breath        3. Erectile dysfunction, unspecified erectile dysfunction type  sildenafil (VIAGRA) 100 MG tablet      4. 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 Gastroesophageal reflux disease without esophagitis        7. Hyperglycemia  Hemoglobin A1C      8. Prostate cancer screening  PSA Screening      9. Encounter for screening for HIV  HIV 1/2 Ag/Ab, 4TH Generation,W Rflx Confirm      10. Obesity, Class III, BMI 40-49.9 (morbid obesity) ([facility] [address]          Information on hyperlipidemia, hypertension, low carbohydrate diet, purine restricted diet, starting weight loss plan, shortness of breath, GERD, ED, and on PSA testing provided. Encouraged 150 minutes of low impact aerobic activity weekly. Also encouraged resistance training every other day with 24-48 hours of muscle glucose uptake subsequently. Suspect patient's fatigue/shortness of breath with exertion is related to body habitus/weight gain. Did review with patient his 2019 heart cath. May need to refer back to cardiology if symptoms persist.  Reviewed with patient his care per infectious disease regarding left knee prosthesis infection. Continue antibiotics and follow-up with infectious disease as planned. Tolerating the Zetia in addition to his atorvastatin. Await fasting labs from today. Also checking uric acid level with underlying history of gout as well as A1c with prior noted elevated glucose and PSA with nocturia once nightly as well as screening HIV. Printed prescription for sildenafil sent to patient may take half or whole tablet as needed for ED. Patient verbally understands cannot combine with nitrates such as nitroglycerin as this may result in death. Good Rx card given if he cannot get this filled at the [facility]. Plan reassessment here next January for subsequent wellness exam and fasting. Discharge Meds:  Current Outpatient Medications   Medication Sig Dispense Refill    atorvastatin (LIPITOR) 40 MG tablet TAKE 1 TABLET BY MOUTH EVERY DAY 90 tablet 2    ezetimibe (ZETIA) 10 MG tablet Take 1 tablet by mouth in the morning.  90 tablet 2    sildenafil (VIAGRA) 100 MG tablet Take 1 tablet by mouth daily as needed for Erectile Dysfunction 30 tablet 2    acetaminophen (TYLENOL) 500 MG tablet Take 500 mg by mouth every 6 hours as needed      allopurinol (ZYLOPRIM) 300 MG tablet Take 300 mg by mouth 2 times daily      aspirin 81 MG EC tablet Take 81 mg by mouth daily      cefadroxil (DURICEF) 500 MG capsule Take 500 mg by mouth 2 times daily      chlorthalidone (HYGROTON) 25 MG tablet Take 25 mg by mouth every 6 hours as needed      diclofenac (VOLTAREN) 50 MG EC tablet Take 50 mg by mouth 2 times daily      FLUoxetine (PROZAC) 20 MG capsule Take 60 mg by mouth daily      ibuprofen (ADVIL;MOTRIN) 200 MG tablet Take 200 mg by mouth every 6 hours as needed      lisinopril-hydroCHLOROthiazide (PRINZIDE;ZESTORETIC) 20-12.5 MG per tablet Take 2 tablets by mouth daily      pantoprazole (PROTONIX) 40 MG tablet Take 40 mg by mouth 2 times daily      prazosin (MINIPRESS) 2 MG capsule Take 4 mg by mouth      rifAMPin (RIFADIN) 300 MG capsule Take 300 mg by mouth every 12 hours      zolpidem (AMBIEN) 10 MG tablet Take 10 mg by mouth. zoster recombinant adjuvanted vaccine [facility]) 50 MCG/0.5ML SUSR injection 0.5mL by IntraMUSCular route once now and then repeat in 2-6 months (Patient not taking: Reported on 7/25/2022)       No current facility-administered medications for this visit. Return in about 6 months (around 1/24/2023) for sub wellness and fasting. Any new medications were explained today including any potential drug interactions or significant side effects. The patient's questions in regards to this were answered today. Dictated using voice recognition software.   Proofread, but unrecognized errors may exist.

## 2022-07-26 LAB
EST. AVERAGE GLUCOSE BLD GHB EST-MCNC: 117 MG/DL
HBA1C MFR BLD: 5.7 % (ref 4.8–5.6)

## 2023-01-26 ENCOUNTER — OFFICE VISIT (OUTPATIENT)
Dept: FAMILY MEDICINE CLINIC | Facility: CLINIC | Age: 63
End: 2023-01-26
Payer: MEDICARE

## 2023-01-26 VITALS
WEIGHT: 298.1 LBS | OXYGEN SATURATION: 93 % | RESPIRATION RATE: 18 BRPM | DIASTOLIC BLOOD PRESSURE: 77 MMHG | BODY MASS INDEX: 44.15 KG/M2 | TEMPERATURE: 97.8 F | HEART RATE: 78 BPM | HEIGHT: 69 IN | SYSTOLIC BLOOD PRESSURE: 118 MMHG

## 2023-01-26 DIAGNOSIS — S76.301D RIGHT HAMSTRING INJURY, SUBSEQUENT ENCOUNTER: ICD-10-CM

## 2023-01-26 DIAGNOSIS — L20.9 ATOPIC DERMATITIS, UNSPECIFIED TYPE: ICD-10-CM

## 2023-01-26 DIAGNOSIS — E78.2 MIXED HYPERLIPIDEMIA: ICD-10-CM

## 2023-01-26 DIAGNOSIS — Z00.00 MEDICARE ANNUAL WELLNESS VISIT, SUBSEQUENT: Primary | ICD-10-CM

## 2023-01-26 DIAGNOSIS — E66.01 OBESITY, CLASS III, BMI 40-49.9 (MORBID OBESITY) (HCC): ICD-10-CM

## 2023-01-26 DIAGNOSIS — Z23 NEED FOR PROPHYLACTIC VACCINATION AGAINST DIPHTHERIA-TETANUS-PERTUSSIS (DTP): ICD-10-CM

## 2023-01-26 PROCEDURE — 3078F DIAST BP <80 MM HG: CPT | Performed by: FAMILY MEDICINE

## 2023-01-26 PROCEDURE — 3074F SYST BP LT 130 MM HG: CPT | Performed by: FAMILY MEDICINE

## 2023-01-26 PROCEDURE — G0439 PPPS, SUBSEQ VISIT: HCPCS | Performed by: FAMILY MEDICINE

## 2023-01-26 PROCEDURE — 99213 OFFICE O/P EST LOW 20 MIN: CPT | Performed by: FAMILY MEDICINE

## 2023-01-26 RX ORDER — METHOCARBAMOL 750 MG/1
750 TABLET, FILM COATED ORAL 4 TIMES DAILY
COMMUNITY

## 2023-01-26 ASSESSMENT — PATIENT HEALTH QUESTIONNAIRE - PHQ9
SUM OF ALL RESPONSES TO PHQ9 QUESTIONS 1 & 2: 0
2. FEELING DOWN, DEPRESSED OR HOPELESS: 0
SUM OF ALL RESPONSES TO PHQ QUESTIONS 1-9: 0
SUM OF ALL RESPONSES TO PHQ QUESTIONS 1-9: 0
1. LITTLE INTEREST OR PLEASURE IN DOING THINGS: 0
SUM OF ALL RESPONSES TO PHQ QUESTIONS 1-9: 0
SUM OF ALL RESPONSES TO PHQ QUESTIONS 1-9: 0

## 2023-01-26 ASSESSMENT — LIFESTYLE VARIABLES
HOW MANY STANDARD DRINKS CONTAINING ALCOHOL DO YOU HAVE ON A TYPICAL DAY: 3 OR 4
HOW OFTEN DO YOU HAVE A DRINK CONTAINING ALCOHOL: MONTHLY OR LESS

## 2023-01-26 NOTE — PROGRESS NOTES
Medicare Annual Wellness Visit    Kiya Cardona is here for Medicare AWV (1 yr, fasting/)    Assessment & Plan   Medicare annual wellness visit, subsequent  Need for prophylactic vaccination against diphtheria-tetanus-pertussis (DTP)  -     Tdap (ADACEL) 5-2-15.5 LF-MCG/0.5 injection; Inject 0.5 mLs into the muscle once for 1 dose, Disp-0.5 mL, R-0Print    Discussed with patient in detail Medicare subsequent annual wellness exam.  Wellness/anticipatory guidance information provided. Information on advanced directives discussed and printed. Tdap vaccine potential benefit discussed with prescription(s) printed; should discuss with pharmacy potential cost(s). Patient was referred for ACP advanced directive assistance. [facility] and have information sent to our office. He does have hearing aids. Recommendations for Preventive Services Due: see orders and patient instructions/AVS.  Recommended screening schedule for the next 5-10 years is provided to the patient in written form: see Patient Instructions/AVS.     Return in 6 months (on 7/26/2023) for fasting. Patient's complete Health Risk Assessment and screening values have been reviewed and are found in Flowsheets. The following problems were reviewed today and where indicated follow up appointments were made and/or referrals ordered. Positive Risk Factor Screenings with Interventions:               General HRA Questions:  Select all that apply: (!) New or Increased Pain    Pain Interventions:  Referred to: Dr. Chidi Roberson with his office messaged today.        Weight and Activity:  Physical Activity: Inactive    Days of Exercise per Week: 0 days    Minutes of Exercise per Session: 0 min     On average, how many days per week do you engage in moderate to strenuous exercise (like a brisk walk)?: 0 days  Have you lost any weight without trying in the past 3 months?: No  Body mass index: (!) 44.02      Inactivity Interventions:  When able restart [employer]  Obesity Interventions:  Low carb diet          Dentist Screen:  Have you seen the dentist within the past year?: (!) No    Intervention:  Advised to schedule with their dentist      Safety:  Do you have either shower bars, grab bars, non-slip mats or non-slip surfaces in your shower or bathtub?: (!) No       Advanced Directives:  Do you have a Living Will?: (!) No    Intervention:  has NO advanced directive  - referred to ACP Coordinator                  Immunization History   Administered Date(s) Administered    Influenza Virus Vaccine 11/11/2022    Td vaccine (adult) 01/01/2005    Zoster Recombinant (Shingrix) 01/07/2021, 03/12/2021               Objective   Vitals:    01/26/23 0856   BP: 118/77   Pulse: 78   Resp: 18   Temp: 97.8 °F (36.6 °C)   TempSrc: Temporal   SpO2: 93%   Weight: 298 lb 1.6 oz (135.2 kg)   Height: 5' 9\" (1.753 m)      Body mass index is 44.02 kg/m². No Known Allergies  Prior to Visit Medications    Medication Sig Taking?  Authorizing Provider   methocarbamol (ROBAXIN) 750 MG tablet Take 750 mg by mouth 4 times daily Yes Historical Provider, MD   Tdap (ADACEL) 5-2-15.5 LF-MCG/0.5 injection Inject 0.5 mLs into the muscle once for 1 dose Yes Ivy Rausch MD   acetaminophen (TYLENOL) 500 MG tablet Take 500 mg by mouth every 6 hours as needed Yes Ar Automatic Reconciliation   allopurinol (ZYLOPRIM) 300 MG tablet Take 300 mg by mouth 2 times daily Yes Ar Automatic Reconciliation   aspirin 81 MG EC tablet Take 81 mg by mouth daily Yes Ar Automatic Reconciliation   diclofenac (VOLTAREN) 50 MG EC tablet Take 50 mg by mouth 2 times daily Yes Ar Automatic Reconciliation   FLUoxetine (PROZAC) 20 MG capsule Take 60 mg by mouth daily Yes Ar Automatic Reconciliation   ibuprofen (ADVIL;MOTRIN) 200 MG tablet Take 200 mg by mouth every 6 hours as needed Yes Ar Automatic Reconciliation   lisinopril-hydroCHLOROthiazide (PRINZIDE;ZESTORETIC) 20-12.5 MG per tablet Take 2 tablets by mouth daily Yes Ar Automatic Reconciliation   pantoprazole (PROTONIX) 40 MG tablet Take 40 mg by mouth 2 times daily Yes Ar Automatic Reconciliation   prazosin (MINIPRESS) 2 MG capsule Take 4 mg by mouth Yes Ar Automatic Reconciliation   zolpidem (AMBIEN) 10 MG tablet Take 10 mg by mouth. Yes Ar Automatic Reconciliation   chlorthalidone (HYGROTON) 25 MG tablet Take 25 mg by mouth every 6 hours as needed  Ar Automatic Reconciliation       CareTeam (Including outside providers/suppliers regularly involved in providing care):   Patient Care Team:  Gianluca Hoyos MD as PCP - General  Gianluca Hoyos MD as PCP - [facility] [place] [address]  Phone: [phone] Fax [phone]  Gianluca Hoyos M.D.  1/26/2023        Maxwell Collin Hernandez Jr. is a 62 y.o. male     HPI:  Patient is seen for follow-up with a couple of concerns.  2 weeks ago he was coming down a ladder and felt something pop in his posterior thigh near his knee.  Went to the ER for evaluation.  Was told he had a tendon injury.  Told to follow-up with Dr. Jones who has performed his right knee replacement in the past.  He has just got it approved through the [facility] that he can see Dr. Jones regarding this.  Ambulating with a cane at present.  Using some mild heat at times.  Describes significant discomfort associated with this    Also over the last 1 to 2 weeks he has had a mild rash breakout on his right forearm and his right side of his abdomen.  Describes some mild itching with this.  States he did bring the dogs inside when he got really cold and thinks that they had some fleas.    This past July patient did have noted elevated lipids with Zetia having not improved these.  He has been intolerant of atorvastatin which has resulted in myalgias.  Prior to the hamstring injury he was going with his  daughter-in-law to the [facility] regularly for exercise through the . [address]. He will be starting the MOVE program through the [employer] soon. ROS:  Denies any chest pain dyspnea diaphoresis palpitations or tachycardia. No polydipsia or polyphagia or polyuria. No large weight fluctuations. No dyspepsia or reflux. No abdominal pain. No hematochezia melena diarrhea or constipation. No dysuria or hematuria. Denies any depression. No recent falls.     No Known Allergies    Active Ambulatory Problems     Diagnosis Date Noted    Obesity, Class III, BMI 40-49.9 (morbid obesity) ([address] 08/27/2019    GERD (gastroesophageal reflux disease) 05/14/2013    AZUL on CPAP 11/14/2012    Dyspnea 11/14/2012    CAD (coronary artery disease) 11/14/2012    HTN (hypertension) 11/14/2012    Mixed hyperlipidemia 11/14/2012    Chronic idiopathic gout involving toe of left foot without tophus 07/15/2021    Osteoarthritis 11/14/2012    Erectile dysfunction 07/25/2022    Infection of prosthetic left knee joint ([address] 07/25/2022     Resolved Ambulatory Problems     Diagnosis Date Noted    Left knee pain 11/27/2021    Septic arthritis of knee, left ([address] 11/28/2021     Past Medical History:   Diagnosis Date    Chronic kidney disease 07/12/2021    Gout     Hiatal hernia     History of COVID-19 10/2020    History of EKG 07/12/2021    HLD (hyperlipidemia) 11/14/2012    Hx of cardiovascular stress test 07/13/2021    Hypercholesteremia     Obesity 11/14/2012    Psychiatric disorder     Sleep apnea 11/14/2012        Past Surgical History:   Procedure Laterality Date    Esthela Maxim      06/29/2010    CHOLECYSTECTOMY      Dr. Michele Puentes 10/09/2007    COLONOSCOPY  07/14/2021    Dr. Jaye Wetzel; benign polyp of the sigmoid colon, diverticulosis of large intestine without perforation    LITHOTRIPSY      rig 03/03/2008 and left 06/30/2008    ORTHOPEDIC SURGERY Left 11/28/2021    Dr. Terra Mccray; irrigation/surgical articular insert revision secondary to septic arthritis    ORTHOPEDIC SURGERY      hand tendon repair    NH UNLISTED PROCEDURE CARDIAC SURGERY      cath no intervention has seen cardio since cath and states heart is fine     TOTAL KNEE ARTHROPLASTY Left 10/29/2021    Dr. Zoya Arredondo; total knee arthroplasty    TOTAL KNEE ARTHROPLASTY Right 03/08/2021    Dr. Barreto April 09/14/2021    Dr. Keyonna Canales; hiatal hernia, GERD without esophagitis        Social History     Tobacco Use    Smoking status: Never    Smokeless tobacco: Never   Vaping Use    Vaping Use: Never used   Substance Use Topics    Alcohol use: Yes     Alcohol/week: 2.0 standard drinks     Types: 1 Cans of beer, 1 Shots of liquor per week     Comment: twice yearly    Drug use: No       Physical Exam:  Blood pressure 118/77, pulse 78, temperature 97.8 °F (36.6 °C), temperature source Temporal, resp. rate 18, height 5' 9\" (1.753 m), weight 298 lb 1.6 oz (135.2 kg), SpO2 93 %. Pleasant male in no apparent distress. Affect is appropriate. HEENT grossly normal.  Oral mucosa is moist and intact. No cervical lymphadenopathy or thyromegaly or nodularity. Lungs clear. No JVD or hepatojugular reflux. Cardiovascular regular S1-S2 without murmurs rubs or gallops. Radial pulses 2+. Abdomen is soft, grossly obese, and nontender with positive bowel sounds. No masses palpated. No pitting peripheral edema or cyanosis of the ankles or feet. Ambulating with a cane today. Has significant ecchymosis of the right medial and posterior thigh with distal mild edema. Right forearm with mildly excoriated papular erythematous lesions with surrounding mild dry skin and  one lesion noted on the right side of his abdomen that is similar. Assessment and Plan:  3. Right hamstring injury, subsequent encounter        4. Atopic dermatitis, unspecified type        5. Mixed hyperlipidemia        6.  Obesity, Class III, BMI 40-49.9 (morbid obesity) ([facility]          Information on hamstring strain rehab exercises provided. Also information on atopic dermatitis, hyperlipidemia, and overcoming barriers to taking statins provided. Have reached out to Dr. Therese Reyes office today to try to get him seen as quickly as possible at his orthopedist.  May use OTC antibiotic ointment to the right forearm and right side with apparent flea irritation and mild dyshidrotic eczema. Have provided samples of CeraVe. Also encouraged healthy low saturated fat diet with patient having not had any benefit on Zetia and not tolerating prior statins. He will need reassessment here fasting in July. Encouraged getting back to the [facility] as soon as possible and participating in the [address] program through the [employer] for weight loss. Discharge Meds:  Current Outpatient Medications   Medication Sig Dispense Refill    methocarbamol (ROBAXIN) 750 MG tablet Take 750 mg by mouth 4 times daily      Tdap (ADACEL) 5-2-15.5 LF-MCG/0.5 injection Inject 0.5 mLs into the muscle once for 1 dose 0.5 mL 0    acetaminophen (TYLENOL) 500 MG tablet Take 500 mg by mouth every 6 hours as needed      allopurinol (ZYLOPRIM) 300 MG tablet Take 300 mg by mouth 2 times daily      aspirin 81 MG EC tablet Take 81 mg by mouth daily      diclofenac (VOLTAREN) 50 MG EC tablet Take 50 mg by mouth 2 times daily      FLUoxetine (PROZAC) 20 MG capsule Take 60 mg by mouth daily      ibuprofen (ADVIL;MOTRIN) 200 MG tablet Take 200 mg by mouth every 6 hours as needed      lisinopril-hydroCHLOROthiazide (PRINZIDE;ZESTORETIC) 20-12.5 MG per tablet Take 2 tablets by mouth daily      pantoprazole (PROTONIX) 40 MG tablet Take 40 mg by mouth 2 times daily      prazosin (MINIPRESS) 2 MG capsule Take 4 mg by mouth      zolpidem (AMBIEN) 10 MG tablet Take 10 mg by mouth. chlorthalidone (HYGROTON) 25 MG tablet Take 25 mg by mouth every 6 hours as needed       No current facility-administered medications for this visit.          Return in 6 months (on 7/26/2023) for fasting. Any new medications were explained today including any potential drug interactions or significant side effects. The patient's questions in regards to this were answered today. Dictated using voice recognition software.   Proofread, but unrecognized errors may exist.

## 2023-01-26 NOTE — PATIENT INSTRUCTIONS
Advance Directives: Care Instructions  Overview  An advance directive is a legal way to state your wishes at the end of your life. It tells your family and your doctor what to do if you can't say what you want. There are two main types of advance directives. You can change them any time your wishes change. Living will. This form tells your family and your doctor your wishes about life support and other treatment. The form is also called a declaration. Medical power of . This form lets you name a person to make treatment decisions for you when you can't speak for yourself. This person is called a health care agent (health care proxy, health care surrogate). The form is also called a durable power of  for health care. If you do not have an advance directive, decisions about your medical care may be made by a family member, or by a doctor or a  who doesn't know you. It may help to think of an advance directive as a gift to the people who care for you. If you have one, they won't have to make tough decisions by themselves. For more information, including forms for your state, see the [address] website (www.caringinfo.org/planning/advance-directives/). Follow-up care is a key part of your treatment and safety. Be sure to make and go to all appointments, and call your doctor if you are having problems. It's also a good idea to know your test results and keep a list of the medicines you take. What should you include in an advance directive? Many states have a unique advance directive form. (It may ask you to address specific issues.) Or you might use a universal form that's approved by many states. If your form doesn't tell you what to address, it may be hard to know what to include in your advance directive. Use the questions below to help you get started. Who do you want to make decisions about your medical care if you are not able to?   What life-support measures do you want if you have a serious illness that gets worse over time or can't be cured? What are you most afraid of that might happen? (Maybe you're afraid of having pain, losing your independence, or being kept alive by machines.)  Where would you prefer to die? (Your home? A hospital? A nursing home?)  Do you want to donate your organs when you die? Do you want certain Latter-day practices performed before you die? When should you call for help? Be sure to contact your doctor if you have any questions. Where can you learn more? Go to http://www.rivera.com/ and enter R264 to learn more about \"Advance Directives: Care Instructions. \"  Current as of: June 16, 2022               Content Version: 13.5  © 7872-2372 Healthwise, [employer]. Care instructions adapted under license by [facility] ([facility]). If you have questions about a medical condition or this instruction, always ask your healthcare professional. [place] [address] any warranty or liability for your use of this information. Starting a Weight Loss Plan: Care Instructions  Overview     If you're thinking about losing weight, it can be hard to know where to start. Your doctor can help you set up a weight loss plan that best meets your needs. You may want to take a class on nutrition or exercise, or you could join a weight loss support group. If you have questions about how to make changes to your eating or exercise habits, ask your doctor about seeing a registered dietitian or an exercise specialist.  It can be a big challenge to lose weight. But you don't have to make huge changes at once. Make small changes, and stick with them. When those changes become habit, add a few more changes. If you don't think you're ready to make changes right now, try to pick a date in the future. Make an appointment to see your doctor to discuss whether the time is right for you to start a plan. Follow-up care is a key part of your treatment and safety.  Be sure to make and go to all appointments, and call your doctor if you are having problems. It's also a good idea to know your test results and keep a list of the medicines you take. How can you care for yourself at home? Set realistic goals. Many people expect to lose much more weight than is likely. A weight loss of 5% to 10% of your body weight may be enough to improve your health. Get family and friends involved to provide support. Talk to them about why you are trying to lose weight, and ask them to help. They can help by participating in exercise and having meals with you, even if they may be eating something different. Find what works best for you. If you do not have time or do not like to cook, a program that offers meal replacement bars or shakes may be better for you. Or if you like to prepare meals, finding a plan that includes daily menus and recipes may be best.  Ask your doctor about other health professionals who can help you achieve your weight loss goals. A dietitian can help you make healthy changes in your diet. An exercise specialist or  can help you develop a safe and effective exercise program.  A counselor or psychiatrist can help you cope with issues such as depression, anxiety, or family problems that can make it hard to focus on weight loss. Consider joining a support group for people who are trying to lose weight. Your doctor can suggest groups in your area. Where can you learn more? Go to http://www.woods.com/ and enter U357 to learn more about \"Starting a Weight Loss Plan: Care Instructions. \"  Current as of: August 25, 2022               Content Version: 13.5  © 2006-2022 Healthwise, Incorporated. Care instructions adapted under license by [facility] [employer] [facility] ([facility]).  If you have questions about a medical condition or this instruction, always ask your healthcare professional. [address] any warranty or liability for your use of this information. Learning About Low-Carbohydrate Diets  What is a low-carbohydrate diet? A low-carbohydrate (or \"low-carb\") diet limits foods and drinks that have carbohydrates. This includes grains, fruits, milk and yogurt, and starchy vegetables like potatoes, beans, and corn. It also avoids foods and drinks that have added sugar. Instead, low-carb diets include foods that are high in protein and fat. Why might you follow a low-carb diet? Low-carb diets may be used for a variety of reasons, such as for weight loss. People who have diabetes may use a low-carb diet to help manage their blood sugar levels. What should you do before you start the diet? Talk to your doctor before you try any diet. This is even more important if you have health problems like kidney disease, heart disease, or diabetes. Your doctor may suggest that you meet with a registered dietitian. A dietitian can help you make an eating plan that works for you. What foods do you eat on a low-carb diet? On a low-carb diet, you choose foods that are high in protein and fat. Examples of these are:  Meat, poultry, and fish. Eggs. Nuts, such as walnuts, pecans, almonds, and peanuts. Peanut butter and other nut butters. Tofu. Avocado. Griselda Romain. Non-starchy vegetables like broccoli, cauliflower, green beans, mushrooms, peppers, lettuce, and spinach. Unsweetened non-dairy milks like almond milk and coconut milk. Cheese, cottage cheese, and cream cheese. Where can you learn more? Go to http://www.woods.com/ and enter C335 to learn more about \"Learning About Low-Carbohydrate Diets. \"  Current as of: May 9, 2022               Content Version: 13.5  © 2006-2022 Healthwise, Incorporated. Care instructions adapted under license by [facility] ([facility]).  If you have questions about a medical condition or this instruction, always ask your healthcare professional. [address]  any warranty or liability for your use of this information. Personalized Preventive Plan for Medina Reeder. - 1/26/2023  Medicare offers a range of preventive health benefits. Some of the tests and screenings are paid in full while other may be subject to a deductible, co-insurance, and/or copay. Some of these benefits include a comprehensive review of your medical history including lifestyle, illnesses that may run in your family, and various assessments and screenings as appropriate. After reviewing your medical record and screening and assessments performed today your provider may have ordered immunizations, labs, imaging, and/or referrals for you. A list of these orders (if applicable) as well as your Preventive Care list are included within your After Visit Summary for your review. Other Preventive Recommendations:    A preventive eye exam performed by an eye specialist is recommended every 1-2 years to screen for glaucoma; cataracts, macular degeneration, and other eye disorders. A preventive dental visit is recommended every 6 months. Try to get at least 150 minutes of exercise per week or 10,000 steps per day on a pedometer . Order or download the FREE \"Exercise & Physical Activity: Your Everyday Guide\" from [employer]. Call 9-843.565.6706 or search [employer] online. You need 6801-3549 mg of calcium and 1068-6716 IU of vitamin D per day. It is possible to meet your calcium requirement with diet alone, but a vitamin D supplement is usually necessary to meet this goal.  When exposed to the sun, use a sunscreen that protects against both UVA and UVB radiation with an SPF of 30 or greater. Reapply every 2 to 3 hours or after sweating, drying off with a towel, or swimming. Always wear a seat belt when traveling in a car. Always wear a helmet when riding a bicycle or motorcycle.

## 2023-06-28 ENCOUNTER — OFFICE VISIT (OUTPATIENT)
Dept: FAMILY MEDICINE CLINIC | Facility: CLINIC | Age: 63
End: 2023-06-28
Payer: MEDICARE

## 2023-06-28 VITALS
HEART RATE: 83 BPM | HEIGHT: 69 IN | BODY MASS INDEX: 44.58 KG/M2 | OXYGEN SATURATION: 96 % | SYSTOLIC BLOOD PRESSURE: 122 MMHG | DIASTOLIC BLOOD PRESSURE: 75 MMHG | TEMPERATURE: 98.4 F | RESPIRATION RATE: 18 BRPM | WEIGHT: 301 LBS

## 2023-06-28 DIAGNOSIS — H10.33 ACUTE BACTERIAL CONJUNCTIVITIS OF BOTH EYES: Primary | ICD-10-CM

## 2023-06-28 PROCEDURE — 3078F DIAST BP <80 MM HG: CPT | Performed by: PHYSICIAN ASSISTANT

## 2023-06-28 PROCEDURE — 3074F SYST BP LT 130 MM HG: CPT | Performed by: PHYSICIAN ASSISTANT

## 2023-06-28 PROCEDURE — 99213 OFFICE O/P EST LOW 20 MIN: CPT | Performed by: PHYSICIAN ASSISTANT

## 2023-06-28 RX ORDER — CIPROFLOXACIN HYDROCHLORIDE 3.5 MG/ML
SOLUTION/ DROPS TOPICAL
Qty: 5 ML | Refills: 0 | Status: SHIPPED | OUTPATIENT
Start: 2023-06-28

## 2023-06-28 SDOH — ECONOMIC STABILITY: FOOD INSECURITY: WITHIN THE PAST 12 MONTHS, THE FOOD YOU BOUGHT JUST DIDN'T LAST AND YOU DIDN'T HAVE MONEY TO GET MORE.: NEVER TRUE

## 2023-06-28 SDOH — ECONOMIC STABILITY: INCOME INSECURITY: HOW HARD IS IT FOR YOU TO PAY FOR THE VERY BASICS LIKE FOOD, HOUSING, MEDICAL CARE, AND HEATING?: NOT HARD AT ALL

## 2023-06-28 SDOH — ECONOMIC STABILITY: HOUSING INSECURITY
IN THE LAST 12 MONTHS, WAS THERE A TIME WHEN YOU DID NOT HAVE A STEADY PLACE TO SLEEP OR SLEPT IN A SHELTER (INCLUDING NOW)?: NO

## 2023-06-28 SDOH — ECONOMIC STABILITY: FOOD INSECURITY: WITHIN THE PAST 12 MONTHS, YOU WORRIED THAT YOUR FOOD WOULD RUN OUT BEFORE YOU GOT MONEY TO BUY MORE.: NEVER TRUE

## 2023-06-28 ASSESSMENT — PATIENT HEALTH QUESTIONNAIRE - PHQ9
SUM OF ALL RESPONSES TO PHQ QUESTIONS 1-9: 0
SUM OF ALL RESPONSES TO PHQ QUESTIONS 1-9: 0
1. LITTLE INTEREST OR PLEASURE IN DOING THINGS: 0
SUM OF ALL RESPONSES TO PHQ QUESTIONS 1-9: 0
SUM OF ALL RESPONSES TO PHQ9 QUESTIONS 1 & 2: 0
2. FEELING DOWN, DEPRESSED OR HOPELESS: 0
SUM OF ALL RESPONSES TO PHQ QUESTIONS 1-9: 0

## 2023-06-28 ASSESSMENT — ENCOUNTER SYMPTOMS
EYE PAIN: 0
EYE DISCHARGE: 1
EYE ITCHING: 1
EYE REDNESS: 1
RESPIRATORY NEGATIVE: 1

## 2023-07-27 ENCOUNTER — OFFICE VISIT (OUTPATIENT)
Dept: FAMILY MEDICINE CLINIC | Facility: CLINIC | Age: 63
End: 2023-07-27

## 2023-07-27 VITALS
RESPIRATION RATE: 18 BRPM | SYSTOLIC BLOOD PRESSURE: 96 MMHG | WEIGHT: 296.9 LBS | HEART RATE: 106 BPM | DIASTOLIC BLOOD PRESSURE: 54 MMHG | TEMPERATURE: 97.3 F | HEIGHT: 69 IN | BODY MASS INDEX: 43.97 KG/M2 | OXYGEN SATURATION: 97 %

## 2023-07-27 DIAGNOSIS — D22.9 ATYPICAL NEVI: ICD-10-CM

## 2023-07-27 DIAGNOSIS — R35.1 NOCTURIA: ICD-10-CM

## 2023-07-27 DIAGNOSIS — R06.02 SHORTNESS OF BREATH: Primary | ICD-10-CM

## 2023-07-27 DIAGNOSIS — E66.01 OBESITY, CLASS III, BMI 40-49.9 (MORBID OBESITY) (HCC): ICD-10-CM

## 2023-07-27 DIAGNOSIS — R63.5 WEIGHT GAIN: ICD-10-CM

## 2023-07-27 DIAGNOSIS — M25.512 CHRONIC LEFT SHOULDER PAIN: ICD-10-CM

## 2023-07-27 DIAGNOSIS — E78.2 MIXED HYPERLIPIDEMIA: ICD-10-CM

## 2023-07-27 DIAGNOSIS — M25.50 POLYARTHRALGIA: ICD-10-CM

## 2023-07-27 DIAGNOSIS — R74.8 ELEVATED CK: ICD-10-CM

## 2023-07-27 DIAGNOSIS — I10 PRIMARY HYPERTENSION: ICD-10-CM

## 2023-07-27 DIAGNOSIS — M1A.0720 CHRONIC IDIOPATHIC GOUT INVOLVING TOE OF LEFT FOOT WITHOUT TOPHUS: ICD-10-CM

## 2023-07-27 DIAGNOSIS — L57.0 MULTIPLE ACTINIC KERATOSES: ICD-10-CM

## 2023-07-27 DIAGNOSIS — R73.9 HYPERGLYCEMIA: ICD-10-CM

## 2023-07-27 DIAGNOSIS — K21.9 GASTROESOPHAGEAL REFLUX DISEASE WITHOUT ESOPHAGITIS: ICD-10-CM

## 2023-07-27 DIAGNOSIS — G89.29 CHRONIC LEFT SHOULDER PAIN: ICD-10-CM

## 2023-07-27 NOTE — PROGRESS NOTES
[address], [address] [facility]  Phone: [phone] Fax [phone]  Chika Tilley. Sue Pan M.D.  7/27/2023        Oralia Gutiérrez. is a 61 y.o. male     HPI:  Patient is seen for Follow-up (6 months, not fasting)  Patient comes in today but forgot to fast.  He has several concerns. He gets his medications through the [place]. Takes his lisinopril HCT, 2 tablets daily for hypertension. Does not recall taking any chlorthalidone recently. He is taking diclofenac for chronic pain/polyarthralgias. He has had prior noted elevated glucose in prediabetic range. He also has had elevated CK level this past January but this was after rupture of a hamstring. He denies any recent gout. No breakthrough heartburn or reflux. He has noticed increased weight gain in the recent past.  His routine exercise other than occasionally doing some exercise in his pool. Plans on joining back the gym again like he was doing 6 months ago. He also has noticed some increased shortness of breath but not associated with chest pain or diaphoresis. He relates this to the weight gain. He is concerned about recurrent left shoulder pain which is mostly painful at night when he lies down. Often keeps him awake. No radicular pain but does describe his left hand going numb at times. He recalls initially noticing left shoulder pain in 2012 when he was working at [employer] and they were correcting ergonomics of his job. Does not recall any injury. Does recall getting a shoulder injection years ago. He has seen Dr. Cindy Perez for orthopedic needs recently. Describes 1 episode of nocturia each night. Has not had any gout flares in the recent past.  Continues his allopurinol. He has had elevated lipids in the past and has tried most recently Zetia without any noted improvement.     Patient has noticed some recurrence of lesions on his forearms that have been rough and sometimes raised and

## 2023-07-31 ENCOUNTER — NURSE ONLY (OUTPATIENT)
Dept: FAMILY MEDICINE CLINIC | Facility: CLINIC | Age: 63
End: 2023-07-31

## 2023-07-31 DIAGNOSIS — K21.9 GASTROESOPHAGEAL REFLUX DISEASE WITHOUT ESOPHAGITIS: ICD-10-CM

## 2023-07-31 DIAGNOSIS — R63.5 WEIGHT GAIN: ICD-10-CM

## 2023-07-31 DIAGNOSIS — M25.50 POLYARTHRALGIA: ICD-10-CM

## 2023-07-31 DIAGNOSIS — R73.9 HYPERGLYCEMIA: ICD-10-CM

## 2023-07-31 DIAGNOSIS — R74.8 ELEVATED CK: ICD-10-CM

## 2023-07-31 DIAGNOSIS — R35.1 NOCTURIA: ICD-10-CM

## 2023-07-31 DIAGNOSIS — I10 PRIMARY HYPERTENSION: ICD-10-CM

## 2023-07-31 DIAGNOSIS — E78.2 MIXED HYPERLIPIDEMIA: ICD-10-CM

## 2023-07-31 DIAGNOSIS — M1A.0720 CHRONIC IDIOPATHIC GOUT INVOLVING TOE OF LEFT FOOT WITHOUT TOPHUS: ICD-10-CM

## 2023-07-31 LAB
BASOPHILS # BLD: 0.1 K/UL (ref 0–0.2)
BASOPHILS NFR BLD: 1 % (ref 0–2)
DIFFERENTIAL METHOD BLD: ABNORMAL
EOSINOPHIL # BLD: 0.3 K/UL (ref 0–0.8)
EOSINOPHIL NFR BLD: 4 % (ref 0.5–7.8)
ERYTHROCYTE [DISTWIDTH] IN BLOOD BY AUTOMATED COUNT: 13.6 % (ref 11.9–14.6)
ERYTHROCYTE [SEDIMENTATION RATE] IN BLOOD: 22 MM/HR
HCT VFR BLD AUTO: 43 % (ref 41.1–50.3)
HGB BLD-MCNC: 14.2 G/DL (ref 13.6–17.2)
IMM GRANULOCYTES # BLD AUTO: 0 K/UL (ref 0–0.5)
IMM GRANULOCYTES NFR BLD AUTO: 0 % (ref 0–5)
LYMPHOCYTES # BLD: 2.1 K/UL (ref 0.5–4.6)
LYMPHOCYTES NFR BLD: 27 % (ref 13–44)
MCH RBC QN AUTO: 30.9 PG (ref 26.1–32.9)
MCHC RBC AUTO-ENTMCNC: 33 G/DL (ref 31.4–35)
MCV RBC AUTO: 93.7 FL (ref 82–102)
MONOCYTES # BLD: 0.6 K/UL (ref 0.1–1.3)
MONOCYTES NFR BLD: 8 % (ref 4–12)
NEUTS SEG # BLD: 4.7 K/UL (ref 1.7–8.2)
NEUTS SEG NFR BLD: 60 % (ref 43–78)
NRBC # BLD: 0 K/UL (ref 0–0.2)
PLATELET # BLD AUTO: 151 K/UL (ref 150–450)
PMV BLD AUTO: 13 FL (ref 9.4–12.3)
RBC # BLD AUTO: 4.59 M/UL (ref 4.23–5.6)
WBC # BLD AUTO: 7.7 K/UL (ref 4.3–11.1)

## 2023-08-01 LAB
ALBUMIN SERPL-MCNC: 3.7 G/DL (ref 3.2–4.6)
ALBUMIN/GLOB SERPL: 1.1 (ref 0.4–1.6)
ALP SERPL-CCNC: 78 U/L (ref 50–136)
ALT SERPL-CCNC: 27 U/L (ref 12–65)
ANION GAP SERPL CALC-SCNC: 10 MMOL/L (ref 2–11)
AST SERPL-CCNC: 18 U/L (ref 15–37)
BILIRUB SERPL-MCNC: 0.3 MG/DL (ref 0.2–1.1)
BUN SERPL-MCNC: 24 MG/DL (ref 8–23)
CALCIUM SERPL-MCNC: 9.6 MG/DL (ref 8.3–10.4)
CHLORIDE SERPL-SCNC: 108 MMOL/L (ref 101–110)
CHOLEST SERPL-MCNC: 208 MG/DL
CK SERPL-CCNC: 149 U/L (ref 21–215)
CO2 SERPL-SCNC: 23 MMOL/L (ref 21–32)
CREAT SERPL-MCNC: 1.5 MG/DL (ref 0.8–1.5)
EST. AVERAGE GLUCOSE BLD GHB EST-MCNC: 114 MG/DL
GLOBULIN SER CALC-MCNC: 3.5 G/DL (ref 2.8–4.5)
GLUCOSE SERPL-MCNC: 101 MG/DL (ref 65–100)
HBA1C MFR BLD: 5.6 % (ref 4.8–5.6)
HDLC SERPL-MCNC: 30 MG/DL (ref 40–60)
HDLC SERPL: 6.9
LDLC SERPL CALC-MCNC: ABNORMAL MG/DL
LDLC SERPL DIRECT ASSAY-MCNC: 129 MG/DL
POTASSIUM SERPL-SCNC: 4.5 MMOL/L (ref 3.5–5.1)
PROT SERPL-MCNC: 7.2 G/DL (ref 6.3–8.2)
PSA SERPL-MCNC: 0.9 NG/ML
SODIUM SERPL-SCNC: 141 MMOL/L (ref 133–143)
TRIGL SERPL-MCNC: 432 MG/DL (ref 35–150)
TSH, 3RD GENERATION: 2.74 UIU/ML (ref 0.36–3.74)
URATE SERPL-MCNC: 6.9 MG/DL (ref 2.6–6)
VLDLC SERPL CALC-MCNC: 86.4 MG/DL (ref 6–23)

## 2024-06-14 ENCOUNTER — OFFICE VISIT (OUTPATIENT)
Dept: FAMILY MEDICINE CLINIC | Facility: CLINIC | Age: 64
End: 2024-06-14

## 2024-06-14 VITALS
TEMPERATURE: 97.9 F | DIASTOLIC BLOOD PRESSURE: 81 MMHG | RESPIRATION RATE: 16 BRPM | BODY MASS INDEX: 42.36 KG/M2 | HEART RATE: 79 BPM | SYSTOLIC BLOOD PRESSURE: 129 MMHG | WEIGHT: 286 LBS | OXYGEN SATURATION: 95 % | HEIGHT: 69 IN

## 2024-06-14 DIAGNOSIS — I10 PRIMARY HYPERTENSION: ICD-10-CM

## 2024-06-14 DIAGNOSIS — R35.1 NOCTURIA: ICD-10-CM

## 2024-06-14 DIAGNOSIS — K21.9 GASTROESOPHAGEAL REFLUX DISEASE WITHOUT ESOPHAGITIS: ICD-10-CM

## 2024-06-14 DIAGNOSIS — E78.2 MIXED HYPERLIPIDEMIA: ICD-10-CM

## 2024-06-14 DIAGNOSIS — E66.01 OBESITY, CLASS III, BMI 40-49.9 (MORBID OBESITY) (HCC): ICD-10-CM

## 2024-06-14 DIAGNOSIS — R73.9 HYPERGLYCEMIA: ICD-10-CM

## 2024-06-14 DIAGNOSIS — M1A.0720 CHRONIC IDIOPATHIC GOUT INVOLVING TOE OF LEFT FOOT WITHOUT TOPHUS: ICD-10-CM

## 2024-06-14 DIAGNOSIS — R06.09 DYSPNEA ON EXERTION: Primary | ICD-10-CM

## 2024-06-14 DIAGNOSIS — M25.512 CHRONIC LEFT SHOULDER PAIN: ICD-10-CM

## 2024-06-14 DIAGNOSIS — G89.29 CHRONIC LEFT SHOULDER PAIN: ICD-10-CM

## 2024-06-14 DIAGNOSIS — Z82.49 FAMILY HISTORY OF EARLY CAD: ICD-10-CM

## 2024-06-14 LAB
ALBUMIN SERPL-MCNC: 3.9 G/DL (ref 3.2–4.6)
ALBUMIN/GLOB SERPL: 1.3 (ref 1–1.9)
ALP SERPL-CCNC: 80 U/L (ref 40–129)
ALT SERPL-CCNC: 19 U/L (ref 12–65)
ANION GAP SERPL CALC-SCNC: 8 MMOL/L (ref 9–18)
AST SERPL-CCNC: 21 U/L (ref 15–37)
BASOPHILS # BLD: 0.1 K/UL (ref 0–0.2)
BASOPHILS NFR BLD: 1 % (ref 0–2)
BILIRUB SERPL-MCNC: 0.3 MG/DL (ref 0–1.2)
BUN SERPL-MCNC: 21 MG/DL (ref 8–23)
CALCIUM SERPL-MCNC: 9.8 MG/DL (ref 8.8–10.2)
CHLORIDE SERPL-SCNC: 103 MMOL/L (ref 98–107)
CHOLEST SERPL-MCNC: 210 MG/DL (ref 0–200)
CO2 SERPL-SCNC: 25 MMOL/L (ref 20–28)
CREAT SERPL-MCNC: 1.41 MG/DL (ref 0.8–1.3)
DIFFERENTIAL METHOD BLD: ABNORMAL
EOSINOPHIL # BLD: 0.3 K/UL (ref 0–0.8)
EOSINOPHIL NFR BLD: 5 % (ref 0.5–7.8)
ERYTHROCYTE [DISTWIDTH] IN BLOOD BY AUTOMATED COUNT: 13.5 % (ref 11.9–14.6)
EST. AVERAGE GLUCOSE BLD GHB EST-MCNC: 118 MG/DL
GLUCOSE SERPL-MCNC: 86 MG/DL (ref 70–99)
HBA1C MFR BLD: 5.7 % (ref 0–5.6)
HCT VFR BLD AUTO: 47.4 % (ref 41.1–50.3)
HDLC SERPL-MCNC: 33 MG/DL (ref 40–60)
HDLC SERPL: 6.3 (ref 0–5)
HGB BLD-MCNC: 14.6 G/DL (ref 13.6–17.2)
IMM GRANULOCYTES # BLD AUTO: 0 K/UL (ref 0–0.5)
IMM GRANULOCYTES NFR BLD AUTO: 0 % (ref 0–5)
LDLC SERPL CALC-MCNC: 133 MG/DL (ref 0–100)
LYMPHOCYTES # BLD: 2.6 K/UL (ref 0.5–4.6)
LYMPHOCYTES NFR BLD: 38 % (ref 13–44)
MCH RBC QN AUTO: 30 PG (ref 26.1–32.9)
MCHC RBC AUTO-ENTMCNC: 30.8 G/DL (ref 31.4–35)
MCV RBC AUTO: 97.3 FL (ref 82–102)
MONOCYTES # BLD: 0.6 K/UL (ref 0.1–1.3)
MONOCYTES NFR BLD: 8 % (ref 4–12)
NEUTS SEG # BLD: 3.4 K/UL (ref 1.7–8.2)
NEUTS SEG NFR BLD: 48 % (ref 43–78)
NRBC # BLD: 0 K/UL (ref 0–0.2)
PLATELET # BLD AUTO: 177 K/UL (ref 150–450)
PMV BLD AUTO: 12.4 FL (ref 9.4–12.3)
POTASSIUM SERPL-SCNC: 5.3 MMOL/L (ref 3.5–5.1)
PROT SERPL-MCNC: 6.7 G/DL (ref 6.3–8.2)
PSA SERPL-MCNC: 1.2 NG/ML (ref 0–4)
RBC # BLD AUTO: 4.87 M/UL (ref 4.23–5.6)
SODIUM SERPL-SCNC: 136 MMOL/L (ref 136–145)
TRIGL SERPL-MCNC: 222 MG/DL (ref 0–150)
TSH, 3RD GENERATION: 3.25 UIU/ML (ref 0.27–4.2)
URATE SERPL-MCNC: 4.9 MG/DL (ref 3.9–8.2)
VLDLC SERPL CALC-MCNC: 44 MG/DL (ref 6–23)
WBC # BLD AUTO: 7 K/UL (ref 4.3–11.1)

## 2024-06-14 RX ORDER — FLUOROURACIL 50 MG/G
CREAM TOPICAL
COMMUNITY
Start: 2024-04-23

## 2024-06-14 ASSESSMENT — PATIENT HEALTH QUESTIONNAIRE - PHQ9
SUM OF ALL RESPONSES TO PHQ QUESTIONS 1-9: 0
SUM OF ALL RESPONSES TO PHQ9 QUESTIONS 1 & 2: 0
2. FEELING DOWN, DEPRESSED OR HOPELESS: NOT AT ALL
SUM OF ALL RESPONSES TO PHQ QUESTIONS 1-9: 0
1. LITTLE INTEREST OR PLEASURE IN DOING THINGS: NOT AT ALL

## 2024-06-14 NOTE — PROGRESS NOTES
[facility] [place] [address]  Phone: [phone] Fax [phone]  Gianluca Hoyos M.D.  6/14/2024        Maxwell Hernandez Jr. is a 64 y.o. male     HPI:  Patient is seen for Follow-up Chronic Condition (3 month f/u fasting) and Shoulder Pain (Left x 8 months pt has been seen by Dr. Jones)  Patient comes in today fasting for follow-up.  Received a shoulder injection at Dr. Jones's office 4 to 5 months ago by report.  Continues to have left shoulder pain without radicular discomfort.  Even has noticed some chest sharp discomfort in the last couple weeks that radiates through to his back but he has never been on his [facility] and he has [facility] but he is awaiting it.    He does describe increased shortness of breath with any exertion.  Does have significant family history of coronary disease in his father who had an MI in his early 50s as well as his uncles.  Patient last had cardiac evaluation/heart cath in 2019.    Otherwise reports feeling fairly well.  States he knows he needs to lose weight.  Without the pantoprazole he is having heartburn/reflux daily.  No recent gout flares.  [facility] to [facility] with [facility].  Has history of AZUL with use of CPAP.  Has had prior notation for elevated A1c level.       ROS:  Denies any diaphoresis palpitations or tachycardia. No polydipsia or polyphagia or polyuria.  No large weight fluctuations.  No hematochezia melena diarrhea or constipation. No dysuria or hematuria but occasional nocturia. Denies any depression.     Allergies   Allergen Reactions    Oxycodone Other (See Comments)     Panic attacks.        Active Ambulatory Problems     Diagnosis Date Noted    Obesity, Class III, BMI 40-49.9 (morbid obesity) (Formerly [facility]) 08/27/2019    Gastroesophageal reflux disease without esophagitis 05/14/2013    AZUL on CPAP 11/14/2012    Dyspnea 11/14/2012    CAD (coronary

## 2024-07-18 ENCOUNTER — HOSPITAL ENCOUNTER (EMERGENCY)
Age: 64
Discharge: HOME | End: 2024-07-18
Attending: EMERGENCY MEDICINE
Payer: MEDICARE

## 2024-07-18 ENCOUNTER — APPOINTMENT (OUTPATIENT)
Dept: GENERAL RADIOLOGY | Age: 64
End: 2024-07-18
Payer: MEDICARE

## 2024-07-18 VITALS
DIASTOLIC BLOOD PRESSURE: 87 MMHG | OXYGEN SATURATION: 100 % | TEMPERATURE: 98.1 F | BODY MASS INDEX: 40.09 KG/M2 | WEIGHT: 280 LBS | HEIGHT: 70 IN | SYSTOLIC BLOOD PRESSURE: 120 MMHG | HEART RATE: 90 BPM | RESPIRATION RATE: 20 BRPM

## 2024-07-18 DIAGNOSIS — S93.401A SPRAIN OF RIGHT ANKLE, UNSPECIFIED LIGAMENT, INITIAL ENCOUNTER: Primary | ICD-10-CM

## 2024-07-18 PROCEDURE — 6370000000 HC RX 637 (ALT 250 FOR IP): Performed by: EMERGENCY MEDICINE

## 2024-07-18 PROCEDURE — 99283 EMERGENCY DEPT VISIT LOW MDM: CPT

## 2024-07-18 PROCEDURE — 73610 X-RAY EXAM OF ANKLE: CPT

## 2024-07-18 RX ORDER — HYDROCODONE BITARTRATE AND ACETAMINOPHEN 5; 325 MG/1; MG/1
1 TABLET ORAL
Status: COMPLETED | OUTPATIENT
Start: 2024-07-18 | End: 2024-07-18

## 2024-07-18 RX ORDER — HYDROCODONE BITARTRATE AND ACETAMINOPHEN 5; 325 MG/1; MG/1
1 TABLET ORAL EVERY 6 HOURS PRN
Qty: 8 TABLET | Refills: 0 | Status: SHIPPED | OUTPATIENT
Start: 2024-07-18 | End: 2024-07-23

## 2024-07-18 RX ORDER — ONDANSETRON 4 MG/1
4 TABLET, ORALLY DISINTEGRATING ORAL
Status: COMPLETED | OUTPATIENT
Start: 2024-07-18 | End: 2024-07-18

## 2024-07-18 RX ADMIN — HYDROCODONE BITARTRATE AND ACETAMINOPHEN 1 TABLET: 5; 325 TABLET ORAL at 22:31

## 2024-07-18 RX ADMIN — ONDANSETRON 4 MG: 4 TABLET, ORALLY DISINTEGRATING ORAL at 22:31

## 2024-07-18 ASSESSMENT — PAIN SCALES - GENERAL
PAINLEVEL_OUTOF10: 8
PAINLEVEL_OUTOF10: 9

## 2024-07-18 ASSESSMENT — PAIN - FUNCTIONAL ASSESSMENT: PAIN_FUNCTIONAL_ASSESSMENT: 0-10

## 2024-07-19 ENCOUNTER — OFFICE VISIT (OUTPATIENT)
Dept: FAMILY MEDICINE CLINIC | Facility: CLINIC | Age: 64
End: 2024-07-19

## 2024-07-19 VITALS
HEIGHT: 70 IN | SYSTOLIC BLOOD PRESSURE: 147 MMHG | TEMPERATURE: 97.5 F | BODY MASS INDEX: 41.16 KG/M2 | WEIGHT: 287.5 LBS | DIASTOLIC BLOOD PRESSURE: 96 MMHG | OXYGEN SATURATION: 94 % | HEART RATE: 69 BPM | RESPIRATION RATE: 17 BRPM

## 2024-07-19 DIAGNOSIS — S93.401D SPRAIN OF RIGHT ANKLE, UNSPECIFIED LIGAMENT, SUBSEQUENT ENCOUNTER: Primary | ICD-10-CM

## 2024-07-19 RX ORDER — NABUMETONE 500 MG/1
500 TABLET, FILM COATED ORAL 2 TIMES DAILY
Qty: 30 TABLET | Refills: 0 | Status: SHIPPED | OUTPATIENT
Start: 2024-07-19

## 2024-07-19 SDOH — ECONOMIC STABILITY: FOOD INSECURITY: WITHIN THE PAST 12 MONTHS, THE FOOD YOU BOUGHT JUST DIDN'T LAST AND YOU DIDN'T HAVE MONEY TO GET MORE.: NEVER TRUE

## 2024-07-19 SDOH — ECONOMIC STABILITY: INCOME INSECURITY: HOW HARD IS IT FOR YOU TO PAY FOR THE VERY BASICS LIKE FOOD, HOUSING, MEDICAL CARE, AND HEATING?: NOT HARD AT ALL

## 2024-07-19 SDOH — ECONOMIC STABILITY: FOOD INSECURITY: WITHIN THE PAST 12 MONTHS, YOU WORRIED THAT YOUR FOOD WOULD RUN OUT BEFORE YOU GOT MONEY TO BUY MORE.: NEVER TRUE

## 2024-07-19 ASSESSMENT — PATIENT HEALTH QUESTIONNAIRE - PHQ9
2. FEELING DOWN, DEPRESSED OR HOPELESS: NOT AT ALL
SUM OF ALL RESPONSES TO PHQ QUESTIONS 1-9: 0
SUM OF ALL RESPONSES TO PHQ9 QUESTIONS 1 & 2: 0
SUM OF ALL RESPONSES TO PHQ QUESTIONS 1-9: 0
1. LITTLE INTEREST OR PLEASURE IN DOING THINGS: NOT AT ALL

## 2024-07-19 ASSESSMENT — ENCOUNTER SYMPTOMS: SHORTNESS OF BREATH: 0

## 2024-07-19 NOTE — ED PROVIDER NOTES
Emergency Department Provider Note       PCP: Gianluca Hoyos MD   Age: 64 y.o.   Sex: male     DISPOSITION Decision To Discharge 07/18/2024 10:42:31 PM       ICD-10-CM    1. Sprain of right ankle, unspecified ligament, initial encounter  S93.401A HYDROcodone-acetaminophen (NORCO) 5-325 MG per tablet          Medical Decision Making     Significant degenerative changes from prior injury.  No obvious new fracture.  Placed in ankle splint and given crutches.  Advise repeat imaging in 1 week if no improvement.     1 acute, uncomplicated illness or injury.  Prescription drug management performed.  Shared medical decision making was utilized in creating the patients health plan today.    I independently ordered and reviewed each unique test.       I interpreted the X-rays soft tissue swelling right ankle, degenerative changes, no acute fracture identified.              History     64-year-old male stepped on the edge of a sidewalk around 7 PM and twisted his right foot outwards.  He reports pain and swelling in his right lateral ankle.  He had prior ankle fracture on that side in the 1980s.        Physical Exam     Vitals signs and nursing note reviewed:  Vitals:    07/18/24 2206   BP: 118/84   Pulse: 98   Resp: 20   Temp: 98.1 °F (36.7 °C)   SpO2: 93%   Weight: 127 kg (280 lb)   Height: 1.778 m (5' 10\")      Physical Exam  Vitals and nursing note reviewed.   Constitutional:       Appearance: Normal appearance.   Musculoskeletal:      Right ankle: Swelling present. Tenderness present over the lateral malleolus. No base of 5th metatarsal or proximal fibula tenderness. Decreased range of motion. Normal pulse.      Right Achilles Tendon: Normal.      Left ankle: Normal.        Procedures     Procedures    Orders Placed This Encounter   Procedures    XR ANKLE RIGHT (MIN 3 VIEWS)    [employer] Ankle Brace, Right    [employer] Crutches; Pair, Right Side Injury; Med

## 2024-07-19 NOTE — PATIENT INSTRUCTIONS
*Take the Relafen (antiinflammatory) twice a day for the next couple of weeks.  *Recommend using ice (for 10 min) on the area 3-4 times a day as able/needed.  *Elevate foot when able.  *Use the crutches for the next week and then as needed after that.  *Use the splint with all activity for the next 3-4 weeks and then as needed.  *Return if not improving in the next 7-10 days.

## 2024-07-19 NOTE — PROGRESS NOTES
[facility] [address] [address]  Phone [phone]      Patient: Maxwell Hernandez  YOB: 1960  Age 64 y.o.  Sex male  Medical Record:  [id number]  Visit Date: 07/19/24  Author:  Fer Mayes PA-C    [facility] Practice Clinic Note    Chief Complaint   Patient presents with    ER follow up     Fell off porch landed on ankle wrong       History of Present Illness  This is a 64-year-old male who presents today for ER follow-up.  He was seen at [facility] emergency department last evening after suffering an injury at home.  He apparently stepped off of a sidewalk and inverted his foot causing him to fall.  He has had pain at the lateral right ankle since the injury.  He went into the ER and an x-ray was obtained that did not show any acute bony injury.  Degenerative changes of the ankle were noted.  Patient notes that he felt a \"pop\" at the lateral aspect of the ankle at the time of injury.  He was given an ankle splint and instructed to use crutches.  They gave him a prescription for Norco but he has not filled it as of yet.  He is not taking any type of anti-inflammatory at this time.  They have been trying to keep the ankle/foot elevated and have been icing intermittently since the time of injury.    Past History:    Past Medical history   Past Medical History:   Diagnosis Date    CAD (coronary artery disease) 11/14/2012    cath done 9/15/2019 at [place] cath lab: NO sig coronary atherosclerosis disease and preserved LVSF with EF at 50%    Chronic kidney disease 07/12/2021    per notes in patient chart from ED visit on 7/12/2021; hospital two months ago - badly dehydrated denies kidney disease and does not see neph or uro    Dyspnea 11/14/2012    only under exertion     GERD (gastroesophageal reflux disease)     controlled with medication     Gout     allopurinol    Hiatal hernia     History of COVID-19 10/2020    respiratory symptoms but no hosp

## 2024-07-19 NOTE — DISCHARGE INSTRUCTIONS
Apply ice and elevate.  Weight-bear as tolerated.  If no improvement after 1 week, talk to your doctor about repeat imaging and possible referral to orthopedics.  Return for worsening or concerning symptoms.

## 2024-07-19 NOTE — ED TRIAGE NOTES
Pt had mechanical fall at approx 19:00. Pain to R ankle. Ankle is swollen. No blood thinners. Denies head injury.

## 2024-08-14 DIAGNOSIS — S93.401D SPRAIN OF RIGHT ANKLE, UNSPECIFIED LIGAMENT, SUBSEQUENT ENCOUNTER: ICD-10-CM

## 2024-08-14 RX ORDER — NABUMETONE 500 MG/1
500 TABLET, FILM COATED ORAL 2 TIMES DAILY
Qty: 30 TABLET | Refills: 0 | Status: SHIPPED | OUTPATIENT
Start: 2024-08-14

## 2024-08-20 NOTE — PROGRESS NOTES
[facility] CARDIOLOGY  [address] [place] [address], [address]  [place] [address]  PHONE: [phone]      24    NAME:  Maxwell Hernandez Jr.  : 1960  MRN: [id number]         SUBJECTIVE:   Maxwell Hernandez Jr. is a 64 y.o. male seen for a consultation visit regarding the following:     Chief Complaint   Patient presents with    Consultation    Hypertension    Hyperlipidemia            HPI:  Consultation is requested by Gianluca Hoyos MD for evaluation of Consultation, Hypertension, and Hyperlipidemia   .    Consult dyspnea. Patient was seen by Dr Rosenberg 5 years ago with essentially normal cardiac cath. Negative perfusion study and CTA of the chest (apart from incidentally noted aberrant right subclavian) in . He's been more short of breath for the last 6 months.  Pretty much with exertion.  Mild chest discomfort, infrequent, mid chest to back.  Recovers after ~ 5 minutes.  Denies cough,fever.  Endorses reflux despite his GERD medications.  He feels these are similar to the symptoms he had evaluated in the recent past.      Never a smoker.  No exercise.  Has a [employer] membership but hasn't been with an ankle injury.  Retired from [employer] after an injury.  Not particularly attentive to diet.  Not diabetic.  Father had 2 MI's in his late 50's/early 60's,   at 74 .  Does not know his biological mother.              PAST CARDIAC HISTORY:  2019       LHC - essentially normal coronary arteries, normal SF and DF  2021       NST (Tatyana) - normal perfusion and EF       CTA - aberrant right subclavian, otherwise normal  2021       Left peroneal DVT                  Cardiac Medications       Antiadrenergic Antihypertensives       prazosin (MINIPRESS) 2 MG capsule Take 2 capsules by mouth       Antihypertensive Combinations       lisinopril-hydroCHLOROthiazide (PRINZIDE;ZESTORETIC) 20-12.5 MG per tablet Take 2 tablets by mouth daily       Salicylates       aspirin 81 MG EC tablet Take 1

## 2024-08-21 ENCOUNTER — INITIAL CONSULT (OUTPATIENT)
Age: 64
End: 2024-08-21
Payer: MEDICARE

## 2024-08-21 VITALS
BODY MASS INDEX: 41.95 KG/M2 | DIASTOLIC BLOOD PRESSURE: 78 MMHG | SYSTOLIC BLOOD PRESSURE: 108 MMHG | HEART RATE: 76 BPM | WEIGHT: 293 LBS | HEIGHT: 70 IN

## 2024-08-21 DIAGNOSIS — R06.09 DYSPNEA ON EXERTION: Primary | ICD-10-CM

## 2024-08-21 DIAGNOSIS — I10 ESSENTIAL HYPERTENSION: ICD-10-CM

## 2024-08-21 DIAGNOSIS — E66.01 MORBID OBESITY (HCC): ICD-10-CM

## 2024-08-21 PROCEDURE — 93000 ELECTROCARDIOGRAM COMPLETE: CPT | Performed by: INTERNAL MEDICINE

## 2024-08-21 PROCEDURE — 3078F DIAST BP <80 MM HG: CPT | Performed by: INTERNAL MEDICINE

## 2024-08-21 PROCEDURE — 3074F SYST BP LT 130 MM HG: CPT | Performed by: INTERNAL MEDICINE

## 2024-08-21 PROCEDURE — 99204 OFFICE O/P NEW MOD 45 MIN: CPT | Performed by: INTERNAL MEDICINE

## 2024-08-21 ASSESSMENT — ENCOUNTER SYMPTOMS: SHORTNESS OF BREATH: 1

## 2024-11-15 NOTE — PROGRESS NOTES
11/14/2012    HTN (hypertension) 11/14/2012    Hx of cardiovascular stress test 07/13/2021    LVEF 65% normal per interpretation from [facility]    Hypercholesteremia     atorvastatin    Obesity 11/14/2012    Osteoarthritis 11/14/2012    Psychiatric disorder     on medication for anxiety depression    Skin cancer     Sleep apnea 11/14/2012    cpap      Past Surgical History:   Procedure Laterality Date    APPENDECTOMY      1983    CARPAL TUNNEL RELEASE      06/29/2010    CHOLECYSTECTOMY      Dr. Stern 10/09/2007    COLONOSCOPY  07/14/2021    Dr. Castillo; benign polyp of the sigmoid colon, diverticulosis of large intestine without perforation    LITHOTRIPSY      righ 03/03/2008 and left 06/30/2008    ORTHOPEDIC SURGERY Left 11/28/2021    Dr. Jones; irrigation/surgical articular insert revision secondary to septic arthritis    ORTHOPEDIC SURGERY      hand tendon repair    LA UNLISTED PROCEDURE CARDIAC SURGERY      cath no intervention has seen cardio since cath and states heart is fine     TOTAL KNEE ARTHROPLASTY Left 10/29/2021    Dr. Jones; total knee arthroplasty    TOTAL KNEE ARTHROPLASTY Right 03/08/2021    Dr. Jones    UPPER GASTROINTESTINAL ENDOSCOPY  09/14/2021    Dr. Castillo; hiatal hernia, GERD without esophagitis     Family History   Problem Relation Age of Onset    Diabetes Mother     Cancer Mother         lung    Cancer Father         lung and brain cancer    Diabetes Father     Heart Attack Father 58        again at 64    Diabetes Sister     Drug Abuse Sister     Hypertension Brother     Diabetes Paternal Grandmother      Social History     Tobacco Use    Smoking status: Never    Smokeless tobacco: Never   Substance Use Topics    Alcohol use: Yes     Alcohol/week: 2.0 standard drinks of alcohol     Types: 1 Cans of beer, 1 Shots of liquor per week     Comment: twice yearly       ROS:    Review of Systems   Cardiovascular:  Positive for chest pain.   Gastrointestinal:  Positive for heartburn.          PHYSICAL

## 2024-11-18 ENCOUNTER — OFFICE VISIT (OUTPATIENT)
Age: 64
End: 2024-11-18
Payer: MEDICARE

## 2024-11-18 VITALS
WEIGHT: 290 LBS | DIASTOLIC BLOOD PRESSURE: 78 MMHG | HEART RATE: 68 BPM | SYSTOLIC BLOOD PRESSURE: 120 MMHG | HEIGHT: 71 IN | BODY MASS INDEX: 40.6 KG/M2

## 2024-11-18 DIAGNOSIS — Z82.49 FAMILY HISTORY OF EARLY CAD: ICD-10-CM

## 2024-11-18 DIAGNOSIS — I10 PRIMARY HYPERTENSION: Primary | ICD-10-CM

## 2024-11-18 DIAGNOSIS — E78.2 MIXED HYPERLIPIDEMIA: ICD-10-CM

## 2024-11-18 PROCEDURE — 3074F SYST BP LT 130 MM HG: CPT | Performed by: INTERNAL MEDICINE

## 2024-11-18 PROCEDURE — 3078F DIAST BP <80 MM HG: CPT | Performed by: INTERNAL MEDICINE

## 2024-11-18 PROCEDURE — 99214 OFFICE O/P EST MOD 30 MIN: CPT | Performed by: INTERNAL MEDICINE

## 2024-11-18 ASSESSMENT — ENCOUNTER SYMPTOMS: HEARTBURN: 1

## 2024-12-13 ENCOUNTER — APPOINTMENT (OUTPATIENT)
Dept: MRI IMAGING | Age: 64
End: 2024-12-13
Payer: OTHER GOVERNMENT

## 2024-12-13 ENCOUNTER — APPOINTMENT (OUTPATIENT)
Dept: CT IMAGING | Age: 64
End: 2024-12-13
Payer: OTHER GOVERNMENT

## 2024-12-13 ENCOUNTER — HOSPITAL ENCOUNTER (EMERGENCY)
Age: 64
Discharge: HOME | End: 2024-12-13
Attending: EMERGENCY MEDICINE | Admitting: INTERNAL MEDICINE
Payer: OTHER GOVERNMENT

## 2024-12-13 VITALS
HEIGHT: 70 IN | BODY MASS INDEX: 40.66 KG/M2 | RESPIRATION RATE: 17 BRPM | WEIGHT: 284 LBS | HEART RATE: 64 BPM | DIASTOLIC BLOOD PRESSURE: 98 MMHG | TEMPERATURE: 98 F | SYSTOLIC BLOOD PRESSURE: 148 MMHG | OXYGEN SATURATION: 98 %

## 2024-12-13 DIAGNOSIS — R42 DIZZINESS: Primary | ICD-10-CM

## 2024-12-13 PROBLEM — I25.119 CORONARY ARTERY DISEASE INVOLVING NATIVE CORONARY ARTERY OF NATIVE HEART WITH ANGINA PECTORIS (HCC): Status: ACTIVE | Noted: 2021-07-13

## 2024-12-13 PROBLEM — N20.1 CALCULUS OF URETER: Status: ACTIVE | Noted: 2018-01-10

## 2024-12-13 LAB
ALBUMIN SERPL-MCNC: 3.9 G/DL (ref 3.2–4.6)
ALBUMIN/GLOB SERPL: 1.1 (ref 1–1.9)
ALP SERPL-CCNC: 84 U/L (ref 40–129)
ALT SERPL-CCNC: 23 U/L (ref 8–55)
ANION GAP SERPL CALC-SCNC: 10 MMOL/L (ref 7–16)
AST SERPL-CCNC: 30 U/L (ref 15–37)
BASOPHILS # BLD: 0.1 K/UL (ref 0–0.2)
BASOPHILS NFR BLD: 1 % (ref 0–2)
BILIRUB SERPL-MCNC: 0.2 MG/DL (ref 0–1.2)
BUN SERPL-MCNC: 19 MG/DL (ref 8–23)
CALCIUM SERPL-MCNC: 9.6 MG/DL (ref 8.8–10.2)
CHLORIDE SERPL-SCNC: 101 MMOL/L (ref 98–107)
CO2 SERPL-SCNC: 25 MMOL/L (ref 20–29)
CREAT SERPL-MCNC: 1.28 MG/DL (ref 0.8–1.3)
DIFFERENTIAL METHOD BLD: NORMAL
EOSINOPHIL # BLD: 0.4 K/UL (ref 0–0.8)
EOSINOPHIL NFR BLD: 4 % (ref 0.5–7.8)
ERYTHROCYTE [DISTWIDTH] IN BLOOD BY AUTOMATED COUNT: 12.8 % (ref 11.9–14.6)
GLOBULIN SER CALC-MCNC: 3.5 G/DL (ref 2.3–3.5)
GLUCOSE BLD STRIP.AUTO-MCNC: 90 MG/DL (ref 65–100)
GLUCOSE SERPL-MCNC: 94 MG/DL (ref 70–99)
HCT VFR BLD AUTO: 43.2 % (ref 41.1–50.3)
HGB BLD-MCNC: 14.9 G/DL (ref 13.6–17.2)
IMM GRANULOCYTES # BLD AUTO: 0 K/UL (ref 0–0.5)
IMM GRANULOCYTES NFR BLD AUTO: 0 % (ref 0–5)
INR PPP: 1
LYMPHOCYTES # BLD: 2.5 K/UL (ref 0.5–4.6)
LYMPHOCYTES NFR BLD: 27 % (ref 13–44)
MCH RBC QN AUTO: 31.2 PG (ref 26.1–32.9)
MCHC RBC AUTO-ENTMCNC: 34.5 G/DL (ref 31.4–35)
MCV RBC AUTO: 90.6 FL (ref 82–102)
MONOCYTES # BLD: 0.9 K/UL (ref 0.1–1.3)
MONOCYTES NFR BLD: 10 % (ref 4–12)
NEUTS SEG # BLD: 5.4 K/UL (ref 1.7–8.2)
NEUTS SEG NFR BLD: 58 % (ref 43–78)
NRBC # BLD: 0 K/UL (ref 0–0.2)
PLATELET # BLD AUTO: 185 K/UL (ref 150–450)
PMV BLD AUTO: 11.3 FL (ref 9.4–12.3)
POTASSIUM SERPL-SCNC: 4.6 MMOL/L (ref 3.5–5.1)
PROT SERPL-MCNC: 7.4 G/DL (ref 6.3–8.2)
PROTHROMBIN TIME: 13.1 SEC (ref 11.3–14.9)
RBC # BLD AUTO: 4.77 M/UL (ref 4.23–5.6)
SERVICE CMNT-IMP: NORMAL
SODIUM SERPL-SCNC: 137 MMOL/L (ref 136–145)
TROPONIN T SERPL HS-MCNC: 8 NG/L (ref 0–22)
WBC # BLD AUTO: 9.2 K/UL (ref 4.3–11.1)

## 2024-12-13 PROCEDURE — 84484 ASSAY OF TROPONIN QUANT: CPT

## 2024-12-13 PROCEDURE — 2580000003 HC RX 258: Performed by: EMERGENCY MEDICINE

## 2024-12-13 PROCEDURE — 85025 COMPLETE CBC W/AUTO DIFF WBC: CPT

## 2024-12-13 PROCEDURE — 82962 GLUCOSE BLOOD TEST: CPT

## 2024-12-13 PROCEDURE — 96374 THER/PROPH/DIAG INJ IV PUSH: CPT

## 2024-12-13 PROCEDURE — 6360000002 HC RX W HCPCS: Performed by: EMERGENCY MEDICINE

## 2024-12-13 PROCEDURE — 6360000004 HC RX CONTRAST MEDICATION: Performed by: EMERGENCY MEDICINE

## 2024-12-13 PROCEDURE — 70450 CT HEAD/BRAIN W/O DYE: CPT

## 2024-12-13 PROCEDURE — 80053 COMPREHEN METABOLIC PANEL: CPT

## 2024-12-13 PROCEDURE — 70551 MRI BRAIN STEM W/O DYE: CPT

## 2024-12-13 PROCEDURE — 85610 PROTHROMBIN TIME: CPT

## 2024-12-13 PROCEDURE — 93005 ELECTROCARDIOGRAM TRACING: CPT | Performed by: EMERGENCY MEDICINE

## 2024-12-13 PROCEDURE — 99285 EMERGENCY DEPT VISIT HI MDM: CPT

## 2024-12-13 PROCEDURE — 70498 CT ANGIOGRAPHY NECK: CPT

## 2024-12-13 PROCEDURE — 6370000000 HC RX 637 (ALT 250 FOR IP): Performed by: EMERGENCY MEDICINE

## 2024-12-13 RX ORDER — CLOPIDOGREL BISULFATE 75 MG/1
300 TABLET ORAL
Status: COMPLETED | OUTPATIENT
Start: 2024-12-13 | End: 2024-12-13

## 2024-12-13 RX ORDER — IOPAMIDOL 755 MG/ML
60 INJECTION, SOLUTION INTRAVASCULAR
Status: COMPLETED | OUTPATIENT
Start: 2024-12-13 | End: 2024-12-13

## 2024-12-13 RX ORDER — ASPIRIN 81 MG/1
324 TABLET, CHEWABLE ORAL
Status: COMPLETED | OUTPATIENT
Start: 2024-12-13 | End: 2024-12-13

## 2024-12-13 RX ORDER — MECLIZINE HYDROCHLORIDE 25 MG/1
25 TABLET ORAL EVERY 6 HOURS PRN
Qty: 25 TABLET | Refills: 0 | Status: SHIPPED | OUTPATIENT
Start: 2024-12-13 | End: 2024-12-23

## 2024-12-13 RX ORDER — MECLIZINE HYDROCHLORIDE 25 MG/1
50 TABLET ORAL
Status: COMPLETED | OUTPATIENT
Start: 2024-12-13 | End: 2024-12-13

## 2024-12-13 RX ADMIN — SODIUM CHLORIDE 2 MG: 9 INJECTION INTRAMUSCULAR; INTRAVENOUS; SUBCUTANEOUS at 19:28

## 2024-12-13 RX ADMIN — MECLIZINE HYDROCHLORIDE 50 MG: 25 TABLET ORAL at 17:32

## 2024-12-13 RX ADMIN — ASPIRIN 324 MG: 81 TABLET, CHEWABLE ORAL at 18:48

## 2024-12-13 RX ADMIN — IOPAMIDOL 60 ML: 755 INJECTION, SOLUTION INTRAVENOUS at 17:20

## 2024-12-13 RX ADMIN — CLOPIDOGREL BISULFATE 300 MG: 75 TABLET ORAL at 18:47

## 2024-12-13 ASSESSMENT — ENCOUNTER SYMPTOMS
BLOOD IN STOOL: 0
VISUAL CHANGE: 0
DIARRHEA: 0
VOMITING: 0
SHORTNESS OF BREATH: 0

## 2024-12-13 ASSESSMENT — PAIN SCALES - GENERAL
PAINLEVEL_OUTOF10: 0
PAINLEVEL_OUTOF10: 0

## 2024-12-13 ASSESSMENT — PAIN - FUNCTIONAL ASSESSMENT: PAIN_FUNCTIONAL_ASSESSMENT: NONE - DENIES PAIN

## 2024-12-13 ASSESSMENT — LIFESTYLE VARIABLES
HOW OFTEN DO YOU HAVE A DRINK CONTAINING ALCOHOL: MONTHLY OR LESS
HOW MANY STANDARD DRINKS CONTAINING ALCOHOL DO YOU HAVE ON A TYPICAL DAY: 1 OR 2

## 2024-12-13 NOTE — ED PROVIDER NOTES
change is evident.               Electronically signed by HUAN RON      CTA HEAD NECK W WO CONTRAST   Final Result   1.  No hemodynamically significant stenosis is noted of the arterial vasculature   of the head and neck.   2.  The right and left common carotid arteries share a common trunk.   3.  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                     Electronically signed by HUAN RON         NIH Stroke Scale  Interval: Baseline  Level of Consciousness (1a): Alert  LOC Questions (1b): Answers both correctly  LOC Commands (1c): Performs both tasks correctly  Best Gaze (2): Normal  Visual (3): No visual loss  Facial Palsy (4): Normal symmetrical movement  Motor Arm, Left (5a): No drift  Motor Arm, Right (5b): No drift  Motor Leg, Left (6a): No drift  Motor Leg, Right (6b): No drift  Limb Ataxia (7): Absent  Sensory (8): Normal  Best Language (9): No aphasia  Dysarthria (10): Normal  Extinction and Inattention (11): No abnormality  Total: 0         No results for input(s): \"COVID19\" in the last 72 hours.     Voice dictation software was used during the making of this note.  This software is not perfect and grammatical and other typographical errors may be present.  This note has not been completely proofread for errors.     Napoleon Hudson MD  12/13/24 2463

## 2024-12-13 NOTE — ED TRIAGE NOTES
Patient arrived with a complaint of dizziness started 4am this morning. Patient reports of feeling very unstable. Patient reports of bad headache.   Reports of recent medication change ( month)- increased prozac and Prazosin     Patient denies ear pain, nausea, vomit, rectal bleeding, chest pain

## 2024-12-14 LAB
EKG ATRIAL RATE: 66 BPM
EKG DIAGNOSIS: NORMAL
EKG P AXIS: 50 DEGREES
EKG P-R INTERVAL: 194 MS
EKG Q-T INTERVAL: 396 MS
EKG QRS DURATION: 86 MS
EKG QTC CALCULATION (BAZETT): 415 MS
EKG R AXIS: 61 DEGREES
EKG T AXIS: 54 DEGREES
EKG VENTRICULAR RATE: 66 BPM

## 2024-12-14 NOTE — DISCHARGE INSTRUCTIONS
Take medication as prescribed    Continue all your current medications  Continue your daily 81 mg aspirin    Call your doctor Monday to schedule follow-up and a recheck visit    Get plenty of rest    Return to ER for any worsening symptoms or new problems which may arise

## 2024-12-14 NOTE — ED NOTES
Patient mobility status  with no difficulty.     I have reviewed discharge instructions with the patient.  The patient verbalized understanding.    Patient left ED via Discharge Method: ambulatory to Home with Spouse.    Opportunity for questions and clarification provided.     Patient given 1 scripts.            Yas Betancur RN  12/13/24 2100

## 2024-12-16 ENCOUNTER — CARE COORDINATION (OUTPATIENT)
Dept: CARE COORDINATION | Facility: CLINIC | Age: 64
End: 2024-12-16

## 2024-12-16 NOTE — CARE COORDINATION
Ambulatory Care Coordination Note     12/16/2024 2:38 PM     ACM outreach attempt by this ACM today to offer care management services. ACM was unable to reach the patient by telephone today;   left voice message requesting a return phone call to this ACM.     ACM: Peyton Shetty RN    PCP/Specialist follow up:   Future Appointments         Provider Specialty Dept Phone    12/20/2024 8:00 AM Lyndsey Li MD Cardiology [phone]    12/27/2024 7:30 AM [facility] [address] Radiology [phone]    1/28/2025 10:00 AM Gianluca Hoyos MD Family Medicine [phone]            Follow Up:   Plan for next ACM outreach in approximately 1-2 days  to complete:  - outreach attempt to offer care management services.

## 2024-12-17 ENCOUNTER — CARE COORDINATION (OUTPATIENT)
Dept: CARE COORDINATION | Facility: CLINIC | Age: 64
End: 2024-12-17

## 2024-12-17 NOTE — CARE COORDINATION
Ambulatory Care Coordination Note     2024 10:45 AM     Patient Current Location:  Home: [address] [place] [address]     This patient was received as a referral from [facility] health report .    ACM contacted the patient by telephone. Verified name and  with patient as identifiers. Provided introduction to self, and explanation of the ACM role.   Patient accepted care management services at this time.          ACM: Peyton Shetty RN     Challenges to be reviewed by the provider   Additional needs identified to be addressed with provider Yes  ER F/U               Method of communication with provider: phone.    Utilization: N/A - Initial Call     Care Summary Note:      Discussed ED CORI for dizziness, pt continues to experience sxs. Discussed HTN educ, pt has not been monitoring at home, agrees to do so. He is seeing cardiology d/t SOB. Discussed falls prevention. ACM called PCP and arranged for f/u visit for , pt is agreeable. Will follow for outcome.      Care Planning:   Education Documentation  Teach importance of blood pressure control, taught by Peyton Shetty RN at 2024  4:32 PM.  Learner: Patient  Readiness: Eager  Method: Explanation  Response: Verbalizes Understanding    Education Comments  No comments found.     ,    Goals Addressed                   This Visit's Progress     Conditions and Symptoms   On track     I will schedule office visits, as directed by my provider.  I will keep my appointment or reschedule if I have to cancel.  I will notify my provider of any barriers to my plan of care.  I will follow my Zone Management tool to seek urgent or emergent care.  I will notify my provider of any symptoms that indicate a worsening of my condition.    Barriers: none  Plan for overcoming my barriers: N/A  Confidence: 10/10  Anticipated Goal Completion Date: 3/17/25                 PCP/Specialist follow up:   Future Appointments         Provider Specialty Dept Phone

## 2024-12-19 ENCOUNTER — OFFICE VISIT (OUTPATIENT)
Dept: FAMILY MEDICINE CLINIC | Facility: CLINIC | Age: 64
End: 2024-12-19

## 2024-12-19 VITALS
DIASTOLIC BLOOD PRESSURE: 84 MMHG | HEIGHT: 70 IN | OXYGEN SATURATION: 96 % | BODY MASS INDEX: 41.95 KG/M2 | SYSTOLIC BLOOD PRESSURE: 129 MMHG | TEMPERATURE: 99.9 F | WEIGHT: 293 LBS | RESPIRATION RATE: 16 BRPM | HEART RATE: 70 BPM

## 2024-12-19 DIAGNOSIS — R42 VERTIGO: Primary | ICD-10-CM

## 2024-12-19 ASSESSMENT — PATIENT HEALTH QUESTIONNAIRE - PHQ9
2. FEELING DOWN, DEPRESSED OR HOPELESS: SEVERAL DAYS
SUM OF ALL RESPONSES TO PHQ QUESTIONS 1-9: 2
1. LITTLE INTEREST OR PLEASURE IN DOING THINGS: SEVERAL DAYS
SUM OF ALL RESPONSES TO PHQ9 QUESTIONS 1 & 2: 2
SUM OF ALL RESPONSES TO PHQ QUESTIONS 1-9: 2

## 2024-12-19 ASSESSMENT — ENCOUNTER SYMPTOMS: SHORTNESS OF BREATH: 0

## 2024-12-19 NOTE — PROGRESS NOTES
History    Not on file   Tobacco Use    Smoking status: Never    Smokeless tobacco: Never   Vaping Use    Vaping status: Never Used   Substance and Sexual Activity    Alcohol use: Yes     Alcohol/week: 2.0 standard drinks of alcohol     Types: 1 Cans of beer, 1 Shots of liquor per week     Comment: twice yearly    Drug use: No    Sexual activity: Not on file   Other Topics Concern    Not on file   Social History Narrative    Not on file     Social Determinants of Health     Financial Resource Strain: Low Risk  (7/19/2024)    Overall Financial Resource Strain (CARDIA)     Difficulty of Paying Living Expenses: Not hard at all   Food Insecurity: No Food Insecurity (7/19/2024)    Hunger Vital Sign     Worried About Running Out of Food in the Last Year: Never true     Ran Out of Food in the Last Year: Never true   Transportation Needs: Unknown (7/19/2024)    PRAPARE - Transportation     Lack of Transportation (Medical): Not on file     Lack of Transportation (Non-Medical): No   Physical Activity: Inactive (1/26/2023)    Exercise Vital Sign     Days of Exercise per Week: 0 days     Minutes of Exercise per Session: 0 min   Stress: Not on file   Social Connections: Unknown (3/20/2021)    Received from [facility]    Social Connections     Frequency of Communication with Friends and Family: Not asked     Frequency of Social Gatherings with Friends and Family: Not asked   Intimate Partner Violence: Unknown (3/20/2021)    Received from [facility]    Intimate Partner Violence     Fear of Current or Ex-Partner: Not asked     Emotionally Abused: Not asked     Physically Abused: Not asked     Sexually Abused: Not asked   Housing Stability: Unknown (7/19/2024)    Housing Stability Vital Sign     Unable to Pay for Housing in the Last Year: Not on file     Number of Places Lived in the Last Year: Not on file     Unstable Housing in the Last Year: No         ROS  Review of Systems   Constitutional:

## 2024-12-19 NOTE — PROGRESS NOTES
[facility] CARDIOLOGY  [address] [place] [address], [address]  [place] [address]  PHONE: [phone]      24    NAME:  Maxwell Hernandez Jr.  : 1960  MRN: [id number]         SUBJECTIVE:   Maxwell Hernandez Jr. is a 64 y.o. male seen for a follow up visit regarding the following:     Chief Complaint   Patient presents with    Primary hypertension    Dyspnea on exertion    Atherosclerotic heart disease of native coronary artery wit            HPI:  Follow up  Primary hypertension, Dyspnea on exertion, and Atherosclerotic heart disease of native coronary artery wit   .    Follow up dyspnea, dyslipidemia, morbid obesity, GERD, was to have had coronary calcium score to refine risk estimate and guide therapeutic decisions.  ER visit 24 with dizziness, MRI negative, improved with meclizine.  He isn't having the coronary calcium score until .  He continues to have the vertigo and his PCP has prescribed vestibular therapy.      He continues to feel that is his most difficult issue right now but also continues to feel chest discomfort, worse when he lies down, and shortness of breath.  His stress echo for same was normal.  He admits to being sedentary, extremely so, and eating a very poor diet.  His wife accompanied him today.  She herself has lost 30 lbs over the last year and feeling much better, trying to help him get motivated as well.        PAST CARDIAC HISTORY:  2019       LHC - essentially normal coronary arteries, normal SF and DF  2021       NST (Tatyana) - normal perfusion and EF       CTA - aberrant right subclavian, otherwise normal  2021       Left peroneal DVT  2024       Normal DSE            Cardiac Medications       Antiadrenergic Antihypertensives       prazosin (MINIPRESS) 2 MG capsule Take 2 capsules by mouth       Antihypertensive Combinations       lisinopril-hydroCHLOROthiazide (PRINZIDE;ZESTORETIC) 20-12.5 MG per tablet Take 2 tablets by mouth daily

## 2024-12-20 ENCOUNTER — OFFICE VISIT (OUTPATIENT)
Age: 64
End: 2024-12-20
Payer: MEDICARE

## 2024-12-20 VITALS
SYSTOLIC BLOOD PRESSURE: 134 MMHG | HEIGHT: 70 IN | BODY MASS INDEX: 41.66 KG/M2 | DIASTOLIC BLOOD PRESSURE: 82 MMHG | HEART RATE: 72 BPM | WEIGHT: 291 LBS

## 2024-12-20 DIAGNOSIS — E66.01 MORBID OBESITY: ICD-10-CM

## 2024-12-20 DIAGNOSIS — R06.02 SHORTNESS OF BREATH: ICD-10-CM

## 2024-12-20 DIAGNOSIS — I10 ESSENTIAL HYPERTENSION: ICD-10-CM

## 2024-12-20 DIAGNOSIS — K21.9 GASTROESOPHAGEAL REFLUX DISEASE WITHOUT ESOPHAGITIS: ICD-10-CM

## 2024-12-20 DIAGNOSIS — R07.89 CHEST DISCOMFORT: Primary | ICD-10-CM

## 2024-12-20 PROCEDURE — 3079F DIAST BP 80-89 MM HG: CPT | Performed by: INTERNAL MEDICINE

## 2024-12-20 PROCEDURE — 3075F SYST BP GE 130 - 139MM HG: CPT | Performed by: INTERNAL MEDICINE

## 2024-12-20 PROCEDURE — 99214 OFFICE O/P EST MOD 30 MIN: CPT | Performed by: INTERNAL MEDICINE

## 2024-12-20 ASSESSMENT — ENCOUNTER SYMPTOMS: SHORTNESS OF BREATH: 1

## 2024-12-24 ENCOUNTER — CARE COORDINATION (OUTPATIENT)
Dept: CARE COORDINATION | Facility: CLINIC | Age: 64
End: 2024-12-24

## 2024-12-24 NOTE — CARE COORDINATION
Phone    12/27/2024  7:30 AM [facility] [address] Radiology [phone]    1/28/2025 10:00 AM Gianluca Hoyos MD Family Medicine [phone]    6/10/2025 8:00 AM Lyndsey Li MD Cardiology [phone]            Follow Up:   Plan for next ACM outreach in approximately 2 weeks to complete:  - goal progression  - education .   Patient  is agreeable to this plan.

## 2024-12-27 ENCOUNTER — HOSPITAL ENCOUNTER (OUTPATIENT)
Dept: CT IMAGING | Age: 64
Discharge: HOME | End: 2024-12-30
Attending: INTERNAL MEDICINE
Payer: OTHER GOVERNMENT

## 2024-12-27 DIAGNOSIS — Z82.49 FAMILY HISTORY OF EARLY CAD: ICD-10-CM

## 2024-12-27 PROCEDURE — 75571 CT HRT W/O DYE W/CA TEST: CPT

## 2024-12-27 NOTE — RESULT ENCOUNTER NOTE
Notified via my chart    Coronary calcium score is indeed elevated at 414.  Anything over 100 benefits from treatment with statin therapy and over 300 we can reduce your risk of a heart attack by aggressively treating the cholesterol to get your LDL < 55 (its currently at 133).  If this was my calcium score, I would begin taking 80 mg atorvastatin.  Either way, very healthy diet (please Google, \"food as medicine jumpstart\" for more detailed and free information) and regular physical activity throughout the day will lower risk significantly.  Let us know if you are agreeable to the medication and we will send it to your pharmacy of choice.  If not already doing so, I also recommend 81 mg aspirin daily.  -Caitlin, sharing this with you since I will be away next week, to follow up on the Rx if he is agreeable. thanks

## 2024-12-30 ENCOUNTER — CARE COORDINATION (OUTPATIENT)
Dept: CARE COORDINATION | Facility: CLINIC | Age: 64
End: 2024-12-30

## 2024-12-30 NOTE — CARE COORDINATION
Ambulatory Care Coordination Note     2024 11:52 AM     Patient Current Location:  Home: [address] [place] [address]     ACM contacted the patient by telephone. Verified name and  with patient as identifiers.         ACM: Peyton Shetty RN     Challenges to be reviewed by the provider   Additional needs identified to be addressed with provider No  none               Method of communication with provider: none.    Utilization: Patient has not had any utilization since our last call.     Care Summary Note:     Pt reports dizziness \"about the same\". Pt is currently in PT and requests call back at a later time.         PCP/Specialist follow up:   Future Appointments         Provider Specialty Dept Phone    2025 10:00 AM Gianluca Hoyos MD Family Medicine [phone]    6/10/2025 8:00 AM Lyndsey Li MD Cardiology [phone]            Follow Up:   Plan for next ACM outreach in approximately 1 week to complete:  - goal progression.   Patient  is agreeable to this plan.

## 2025-01-02 RX ORDER — ATORVASTATIN CALCIUM 80 MG/1
80 TABLET, FILM COATED ORAL DAILY
Qty: 90 TABLET | Refills: 3 | Status: SHIPPED | OUTPATIENT
Start: 2025-01-02

## 2025-01-02 NOTE — TELEPHONE ENCOUNTER
Prescription sent to [email or web address]//[email or web address]  Requested Prescriptions     Signed Prescriptions Disp Refills    atorvastatin (LIPITOR) 80 MG tablet 90 tablet 3     Sig: Take 1 tablet by mouth daily     Authorizing Provider: ALVAREZ BARKLEY     Ordering User: AYDE LING

## 2025-01-02 NOTE — TELEPHONE ENCOUNTER
----- Message from Dr. Lyndsey Li MD sent at 12/27/2024  4:01 PM EST -----  Notified via my chart    Coronary calcium score is indeed elevated at 414.  Anything over 100 benefits from treatment with statin therapy and over 300 we can reduce your risk of a heart attack by aggressively treating the cholesterol to get your LDL < 55 (its currently at 133).  If this was my calcium score, I would begin taking 80 mg atorvastatin.  Either way, very healthy diet (please Google, \"food as medicine jumpstart\" for more detailed and free information) and regular physical activity throughout the day will lower risk significantly.  Let us know if you are agreeable to the medication and we will send it to your pharmacy of choice.  If not already doing so, I also recommend 81 mg aspirin daily.  -Caitlin, sharing this with you since I will be away next week, to follow up on the Rx if he is agreeable. thanks

## 2025-01-02 NOTE — TELEPHONE ENCOUNTER
Patient agreeable to trying medication.    Requested Prescriptions     Pending Prescriptions Disp Refills    atorvastatin (LIPITOR) 80 MG tablet 90 tablet 3     Sig: Take 1 tablet by mouth daily

## 2025-01-06 ENCOUNTER — CARE COORDINATION (OUTPATIENT)
Dept: CARE COORDINATION | Facility: CLINIC | Age: 65
End: 2025-01-06

## 2025-01-06 NOTE — CARE COORDINATION
Ambulatory Care Coordination Note     2025 12:29 PM     Patient Current Location:  Home: [place] [address]     ACM contacted the patient by telephone. Verified name and  with patient as identifiers.         ACM: Peyton Shetty RN     Challenges to be reviewed by the provider   Additional needs identified to be addressed with provider No  none               Method of communication with provider: none.    Utilization: Patient has not had any utilization since our last call.     Care Summary Note:     Pt attending PT once/wk for balance. Pt reports dizziness still comes and goes, denies falls. Encouraged falls precautions. Pt is retired.  Will see PCP for f/u 25. Will follow for outcome.    Assessments Completed:   General Assessment    Do you have any symptoms that are causing concern?: No          Medications Reviewed:   Completed during a previous call     Advance Care Planning:   Not reviewed during this call     Care Planning:    Goals Addressed                   This Visit's Progress     Conditions and Symptoms   On track     I will schedule office visits, as directed by my provider.  I will keep my appointment or reschedule if I have to cancel.  I will notify my provider of any barriers to my plan of care.  I will follow my Zone Management tool to seek urgent or emergent care.  I will notify my provider of any symptoms that indicate a worsening of my condition.    Barriers: none  Plan for overcoming my barriers: N/A  Confidence: 10/10  Anticipated Goal Completion Date: 3/17/25                 PCP/Specialist follow up:   Future Appointments         Provider Specialty Dept Phone    2025 10:00 AM Gianluca Hoyos MD Family Medicine [phone]    6/10/2025 8:00 AM Lyndsey Li MD Cardiology [phone]            Follow Up:   Plan for next ACM outreach in approximately 2 weeks to complete:  - goal progression.   Patient  is agreeable to this plan.

## 2025-01-07 ENCOUNTER — TELEPHONE (OUTPATIENT)
Dept: FAMILY MEDICINE CLINIC | Facility: CLINIC | Age: 65
End: 2025-01-07

## 2025-01-07 NOTE — TELEPHONE ENCOUNTER
Received PT Plan of Care for pt from [facility] - Fer signed and faxed over - waiting for confirmation

## 2025-01-20 ENCOUNTER — CARE COORDINATION (OUTPATIENT)
Dept: CARE COORDINATION | Facility: CLINIC | Age: 65
End: 2025-01-20

## 2025-01-20 NOTE — CARE COORDINATION
Ambulatory Care Coordination Note     2025 11:35 AM     Patient Current Location:  Home: [place] [address]     ACM contacted the patient by telephone. Verified name and  with patient as identifiers.         ACM: Peyton Shetty RN     Challenges to be reviewed by the provider   Additional needs identified to be addressed with provider No  none               Method of communication with provider: none.    Utilization: Patient has not had any utilization since our last call.     Care Summary Note:     Pt reports dizziness every day for last 3d, though not lasting as long now w/ each episode. Pt is attending  OPPT wkly, and transitioning slowly for balance. Pt is continuing to check BP and reports wnl.  Discussed target, and importance in BP control in preventing complications. He has some SOB, reports all cardiac testing neg. Pt states his wt may contribute to SOB, wife has him cutting back on portions. He denies further need of CCM services or further nutritional education. Will close current episode as pt is receiving therapy, no further CCM needs.    Assessments Completed:   Hypertension - Encounter Level          ,   General Assessment              Medications Reviewed:   Completed during a previous call     Advance Care Planning:   Not reviewed during this call     Care Planning:   Education Documentation  No documentation found.  Education Comments  No comments found.     ,    Goals Addressed    None          PCP/Specialist follow up:   Future Appointments         Provider Specialty Dept Phone    2025 10:00 AM Gianluca Hoyos MD Family Medicine [phone]    6/10/2025 8:00 AM Lyndsey Li MD Cardiology [phone]            Follow Up:   No further Ambulatory Care Management follow-up scheduled at this time.  Patient  has Ambulatory Care Manager's contact information for any further questions, concerns or needs.

## 2025-01-28 ENCOUNTER — OFFICE VISIT (OUTPATIENT)
Dept: FAMILY MEDICINE CLINIC | Facility: CLINIC | Age: 65
End: 2025-01-28

## 2025-01-28 VITALS
HEART RATE: 77 BPM | DIASTOLIC BLOOD PRESSURE: 78 MMHG | HEIGHT: 70 IN | OXYGEN SATURATION: 97 % | WEIGHT: 286 LBS | BODY MASS INDEX: 40.94 KG/M2 | SYSTOLIC BLOOD PRESSURE: 119 MMHG | TEMPERATURE: 98.8 F | RESPIRATION RATE: 16 BRPM

## 2025-01-28 DIAGNOSIS — G47.33 OSA ON CPAP: ICD-10-CM

## 2025-01-28 DIAGNOSIS — Z23 NEED FOR PROPHYLACTIC VACCINATION AGAINST DIPHTHERIA-TETANUS-PERTUSSIS (DTP): ICD-10-CM

## 2025-01-28 DIAGNOSIS — Z29.11 NEED FOR RSV VACCINATION: ICD-10-CM

## 2025-01-28 DIAGNOSIS — Z00.00 MEDICARE ANNUAL WELLNESS VISIT, SUBSEQUENT: Primary | ICD-10-CM

## 2025-01-28 DIAGNOSIS — J39.2 THROAT IRRITATION: ICD-10-CM

## 2025-01-28 DIAGNOSIS — E78.2 MIXED HYPERLIPIDEMIA: ICD-10-CM

## 2025-01-28 DIAGNOSIS — R73.9 HYPERGLYCEMIA: ICD-10-CM

## 2025-01-28 DIAGNOSIS — R93.1 ELEVATED CORONARY ARTERY CALCIUM SCORE: ICD-10-CM

## 2025-01-28 DIAGNOSIS — E66.01 OBESITY, CLASS III, BMI 40-49.9 (MORBID OBESITY): ICD-10-CM

## 2025-01-28 DIAGNOSIS — R42 VERTIGO: ICD-10-CM

## 2025-01-28 DIAGNOSIS — F33.41 RECURRENT MAJOR DEPRESSIVE DISORDER, IN PARTIAL REMISSION (HCC): ICD-10-CM

## 2025-01-28 DIAGNOSIS — S93.401D SPRAIN OF RIGHT ANKLE, UNSPECIFIED LIGAMENT, SUBSEQUENT ENCOUNTER: ICD-10-CM

## 2025-01-28 DIAGNOSIS — I10 PRIMARY HYPERTENSION: ICD-10-CM

## 2025-01-28 LAB
ALBUMIN SERPL-MCNC: 4 G/DL (ref 3.2–4.6)
ALBUMIN/GLOB SERPL: 1.3 (ref 1–1.9)
ALP SERPL-CCNC: 81 U/L (ref 40–129)
ALT SERPL-CCNC: 31 U/L (ref 8–55)
ANION GAP SERPL CALC-SCNC: 11 MMOL/L (ref 7–16)
AST SERPL-CCNC: 68 U/L (ref 15–37)
BILIRUB SERPL-MCNC: 0.3 MG/DL (ref 0–1.2)
BUN SERPL-MCNC: 20 MG/DL (ref 8–23)
CALCIUM SERPL-MCNC: 9.5 MG/DL (ref 8.8–10.2)
CHLORIDE SERPL-SCNC: 104 MMOL/L (ref 98–107)
CHOLEST SERPL-MCNC: 139 MG/DL (ref 0–200)
CO2 SERPL-SCNC: 23 MMOL/L (ref 20–29)
CREAT SERPL-MCNC: 1.42 MG/DL (ref 0.8–1.3)
EST. AVERAGE GLUCOSE BLD GHB EST-MCNC: 115 MG/DL
GLOBULIN SER CALC-MCNC: 3.2 G/DL (ref 2.3–3.5)
GLUCOSE SERPL-MCNC: 95 MG/DL (ref 70–99)
HBA1C MFR BLD: 5.7 % (ref 0–5.6)
HDLC SERPL-MCNC: 28 MG/DL (ref 40–60)
HDLC SERPL: 4.9 (ref 0–5)
LDLC SERPL CALC-MCNC: 75 MG/DL (ref 0–100)
POTASSIUM SERPL-SCNC: 4.8 MMOL/L (ref 3.5–5.1)
PROT SERPL-MCNC: 7.1 G/DL (ref 6.3–8.2)
SODIUM SERPL-SCNC: 138 MMOL/L (ref 136–145)
TRIGL SERPL-MCNC: 180 MG/DL (ref 0–150)
VLDLC SERPL CALC-MCNC: 36 MG/DL (ref 6–23)

## 2025-01-28 RX ORDER — RESPIRATORY SYNCYTIAL VISUS VACCINE RECOMBINANT, ADJUVANTED 120MCG/0.5
0.5 KIT INTRAMUSCULAR ONCE
Qty: 0.5 ML | Refills: 0 | Status: SHIPPED | OUTPATIENT
Start: 2025-01-28 | End: 2025-01-28

## 2025-01-28 SDOH — ECONOMIC STABILITY: FOOD INSECURITY: WITHIN THE PAST 12 MONTHS, THE FOOD YOU BOUGHT JUST DIDN'T LAST AND YOU DIDN'T HAVE MONEY TO GET MORE.: PATIENT DECLINED

## 2025-01-28 SDOH — ECONOMIC STABILITY: FOOD INSECURITY: WITHIN THE PAST 12 MONTHS, YOU WORRIED THAT YOUR FOOD WOULD RUN OUT BEFORE YOU GOT MONEY TO BUY MORE.: PATIENT DECLINED

## 2025-01-28 ASSESSMENT — PATIENT HEALTH QUESTIONNAIRE - PHQ9
SUM OF ALL RESPONSES TO PHQ QUESTIONS 1-9: 0
SUM OF ALL RESPONSES TO PHQ QUESTIONS 1-9: 0
SUM OF ALL RESPONSES TO PHQ9 QUESTIONS 1 & 2: 0
2. FEELING DOWN, DEPRESSED OR HOPELESS: NOT AT ALL
SUM OF ALL RESPONSES TO PHQ QUESTIONS 1-9: 0
1. LITTLE INTEREST OR PLEASURE IN DOING THINGS: NOT AT ALL
SUM OF ALL RESPONSES TO PHQ QUESTIONS 1-9: 0

## 2025-01-28 NOTE — PROGRESS NOTES
Lyndsey Li MD   mupirocin (BACTROBAN) 2 % ointment APPLY TO SURGICAL SITE TWICE A DAY AND COVER WITH BANDAGE Yes Provider, MD Elaine   fluorouracil (EFUDEX) 5 % cream APPLY TO SCALY AREAS (USE ONE PLACE AT A TIME) TWICE A DAY X 2 WEEKS THEN D/C Yes Provider, MD Elaine   acetaminophen (TYLENOL) 500 MG tablet Take 1 tablet by mouth every 6 hours as needed Yes Automatic Reconciliation, Ar   allopurinol (ZYLOPRIM) 300 MG tablet Take 1 tablet by mouth daily Yes Automatic Reconciliation, Ar   aspirin 81 MG EC tablet Take 1 tablet by mouth daily Yes Automatic Reconciliation, Ar   FLUoxetine (PROZAC) 20 MG capsule Take 2 capsules by mouth daily Yes Automatic Reconciliation, Ar   lisinopril-hydroCHLOROthiazide (PRINZIDE;ZESTORETIC) 20-12.5 MG per tablet Take 2 tablets by mouth daily Yes Automatic Reconciliation, Ar   pantoprazole (PROTONIX) 40 MG tablet Take 1 tablet by mouth 2 times daily Yes Automatic Reconciliation, Ar   prazosin (MINIPRESS) 2 MG capsule Take 2 capsules by mouth Yes Automatic Reconciliation, Ar   zolpidem (AMBIEN) 10 MG tablet Take 1 tablet by mouth. Yes Automatic Reconciliation, Ar       CareTeam (Including outside providers/suppliers regularly involved in providing care):   Patient Care Team:  Gianluca Hoyos MD as PCP - General  Gianluca Hoyos MD as PCP - Empaneled Provider     Recommendations for Preventive Services Due: see orders and patient instructions/AVS.  Recommended screening schedule for the next 5-10 years is provided to the patient in written form: see Patient Instructions/AVS.     Reviewed and updated this visit:  Tobacco  Allergies  Meds  Problems  Med Hx  Surg Hx  Soc Hx  [place] [address]  Phone: [phone] Fax [phone]  Gianluca Hoyos M.D.  1/28/2025        Maxwell Tinsleyr David JrBruce is a 64 y.o. male     HPI:  Patient is seen for Medicare AWV (sub), Follow-up

## 2025-01-29 ENCOUNTER — CLINICAL DOCUMENTATION (OUTPATIENT)
Dept: SPIRITUAL SERVICES | Age: 65
End: 2025-01-29

## 2025-01-29 NOTE — PROGRESS NOTES
Advance Care Planning   Ambulatory ACP Specialist Patient Outreach    Date:  1/29/2025    ACP Specialist:  Jessica Olsen    Outreach call to patient in follow-up to ACP Specialist referral from:Gianluca Hoyos MD    [x] PCP  [] Provider   [] Ambulatory Care Management [] Other     For:                  [x] Advance Directive Assistance              [] Complete Portable DNR order              [] Complete POST/POLST/MOST              [] Code Status Discussion             [] Discuss Goals of Care             [] Early ACP Decision-Making              [] Other (Specify)    Date Referral Received:1/29/25    Next Step:   [x] ACP scheduled conversation  [] Outreach again in one week               [x] Email / Mail ACP Info Sheets  [x] Email / Mail Advance Directive   [] Closing referral.  Routing closure to referring provider/staff and to ACP Specialist .    [] Closure letter mailed to patient with invitation to contact ACP Specialist if / when ready.   [] Other (Specify here):       [x] At this time, Healthcare Decision Maker Is:   Primary Decision Maker: Karol Hernandez - [facility] - [phone]         [] Primary agent named in scanned advance directive.    [x] Legal Next of Kin.     [] Unable to determine legal decision maker at this time.    Outreaches:         [x] 1st -  Date:  1/29/25               Intervention:  [x] Spoke with Patient   [] Left Voice mail [] Email / Mail    [] [facility] [employer]  [] Other (Specify) :     Outcomes:  Spoke with patient on mobile phone number which is also listed as the home number scheduling a telephone conversation with ACP Specialist Javid Peyman on Monday, 2/10/25 at 9:00 a.m.  Patient's wife Karol Hernandez 9-6-65 would like to complete an AMD as well.             [] 2nd -  Date:                 Intervention:  [] Spoke with Patient  [] Left Voice mail [] Email / Mail    [] MyChart  [] Other (Specify) :              Outcomes:                [] 3rd -  Date:

## 2025-01-29 NOTE — RESULT ENCOUNTER NOTE
Call back A1c level similar to prior at 5.7%.  Triglycerides, fatty acids in the blood, mildly elevated at 180 with HDL, good cholesterol, low at 28.  Really watch sweets and fried foods in diet and increase aerobic activity as able.  Kidney function similar to prior with filtration rate at 55.  May wish to consider stopping the diuretic portion in his blood pressure medicine but simply continue current care for now and reassess at follow-up.  AST liver enzyme mildly elevated.  Weight loss would be beneficial.

## 2025-02-10 ENCOUNTER — CLINICAL DOCUMENTATION (OUTPATIENT)
Dept: CARE COORDINATION | Facility: CLINIC | Age: 65
End: 2025-02-10

## 2025-02-11 NOTE — ACP (ADVANCE CARE PLANNING)
Advance Care Planning    Advance Care Planning Clinical Specialist  Conversation Note    Date of ACP Conversation: 2/10/2025    ACP Clinical Specialist: GEOVANNY DOMINGO    Referral Date: 1/29/2025    Received by: PCP    Conversation Conducted with: Patient with decision making capacity      Current Designated Decision Maker/s:    Primary Decision Maker: Karol Hernandez - Spouse - [phone]      Care Preferences Communicated    Code Status:  If the patient's heart were to stop beating, in their present state of health, the patient's CPR Preference: Yes, attempt CPR    If their health worsens and it becomes clear that the chance of recovery is unlikely, the patient's CPR Preference: No, allow a natural death (No CPR)     Ventilator:  If the patient, in their present state of health, suddenly became very ill and unable to breathe on their own, the patient desires the use of a ventilator (intubation).    If their health worsens and it becomes clear that the chance of recovery is unlikely, the patient does not desire the use of a ventilator (intubation).    [x] Yes  [] No   Educated Patient / Decision Maker regarding differences between advance directives and portable DNR orders.    Conversation Summary:   Called the patient at the appointed time for ACP conversation with alert and oriented patient. Discussed advance directives, early decision making and goals of care. Patient stated that at end of life patient would want comfort-based care. Would agree to use of ventilator with likely recovery. For poor prognosis or for long term he would not agree to Ventilator. Would not want tube feeding and CPR with underlying medical conditions or for long term.     Explained [place] in their entirety. All patient questions answered to patients' satisfaction. Patient completed the [place] during the conversation. In the [place] DD[place] form, the patient chose to decline nutrition and hydration

## 2025-02-14 ENCOUNTER — TELEPHONE (OUTPATIENT)
Dept: FAMILY MEDICINE CLINIC | Facility: CLINIC | Age: 65
End: 2025-02-14

## 2025-02-14 NOTE — TELEPHONE ENCOUNTER
Received POC from [facility] - Fer signed and faxed - putting in scan kiya

## 2025-02-19 ENCOUNTER — CLINICAL DOCUMENTATION (OUTPATIENT)
Dept: CARE COORDINATION | Facility: CLINIC | Age: 65
End: 2025-02-19

## 2025-03-04 ENCOUNTER — CLINICAL DOCUMENTATION (OUTPATIENT)
Dept: CARE COORDINATION | Facility: CLINIC | Age: 65
End: 2025-03-04

## 2025-04-22 ENCOUNTER — OFFICE VISIT (OUTPATIENT)
Dept: FAMILY MEDICINE CLINIC | Facility: CLINIC | Age: 65
End: 2025-04-22
Payer: MEDICARE

## 2025-04-22 VITALS
HEIGHT: 70 IN | SYSTOLIC BLOOD PRESSURE: 108 MMHG | DIASTOLIC BLOOD PRESSURE: 75 MMHG | WEIGHT: 287.9 LBS | TEMPERATURE: 98.2 F | BODY MASS INDEX: 41.22 KG/M2 | RESPIRATION RATE: 16 BRPM | HEART RATE: 76 BPM | OXYGEN SATURATION: 96 %

## 2025-04-22 DIAGNOSIS — L23.7 CONTACT DERMATITIS DUE TO POISON IVY: Primary | ICD-10-CM

## 2025-04-22 PROCEDURE — 3078F DIAST BP <80 MM HG: CPT | Performed by: PHYSICIAN ASSISTANT

## 2025-04-22 PROCEDURE — 1123F ACP DISCUSS/DSCN MKR DOCD: CPT | Performed by: PHYSICIAN ASSISTANT

## 2025-04-22 PROCEDURE — 3074F SYST BP LT 130 MM HG: CPT | Performed by: PHYSICIAN ASSISTANT

## 2025-04-22 PROCEDURE — 99213 OFFICE O/P EST LOW 20 MIN: CPT | Performed by: PHYSICIAN ASSISTANT

## 2025-04-22 RX ORDER — PREDNISONE 10 MG/1
TABLET ORAL
Qty: 48 TABLET | Refills: 0 | Status: SHIPPED | OUTPATIENT
Start: 2025-04-22

## 2025-04-22 ASSESSMENT — PATIENT HEALTH QUESTIONNAIRE - PHQ9
SUM OF ALL RESPONSES TO PHQ QUESTIONS 1-9: 0
2. FEELING DOWN, DEPRESSED OR HOPELESS: NOT AT ALL
SUM OF ALL RESPONSES TO PHQ QUESTIONS 1-9: 0
7. TROUBLE CONCENTRATING ON THINGS, SUCH AS READING THE NEWSPAPER OR WATCHING TELEVISION: NOT AT ALL
5. POOR APPETITE OR OVEREATING: NOT AT ALL
9. THOUGHTS THAT YOU WOULD BE BETTER OFF DEAD, OR OF HURTING YOURSELF: NOT AT ALL
6. FEELING BAD ABOUT YOURSELF - OR THAT YOU ARE A FAILURE OR HAVE LET YOURSELF OR YOUR FAMILY DOWN: NOT AT ALL
10. IF YOU CHECKED OFF ANY PROBLEMS, HOW DIFFICULT HAVE THESE PROBLEMS MADE IT FOR YOU TO DO YOUR WORK, TAKE CARE OF THINGS AT HOME, OR GET ALONG WITH OTHER PEOPLE: NOT DIFFICULT AT ALL
3. TROUBLE FALLING OR STAYING ASLEEP: NOT AT ALL
1. LITTLE INTEREST OR PLEASURE IN DOING THINGS: NOT AT ALL
SUM OF ALL RESPONSES TO PHQ QUESTIONS 1-9: 0
8. MOVING OR SPEAKING SO SLOWLY THAT OTHER PEOPLE COULD HAVE NOTICED. OR THE OPPOSITE, BEING SO FIGETY OR RESTLESS THAT YOU HAVE BEEN MOVING AROUND A LOT MORE THAN USUAL: NOT AT ALL
4. FEELING TIRED OR HAVING LITTLE ENERGY: NOT AT ALL
SUM OF ALL RESPONSES TO PHQ QUESTIONS 1-9: 0

## 2025-04-22 ASSESSMENT — ENCOUNTER SYMPTOMS
WHEEZING: 0
FACIAL SWELLING: 0
CHEST TIGHTNESS: 0
SHORTNESS OF BREATH: 0
TROUBLE SWALLOWING: 0

## 2025-04-22 NOTE — PROGRESS NOTES
[facility] [address] [place] [address]  Phone [phone]      Patient: Maxwell Hernandez  YOB: 1960  Age 65 y.o.  Sex male  Medical Record:  [id number]  Visit Date: 04/22/25  Author:  Fer Mayes PA-C    [facility] Clinic Note    Chief Complaint   Patient presents with    posion ivy      Arms, legs, back        History of Present Illness  History of Present Illness  The patient is a 65-year-old male who presents for evaluation of a rash.  An incident of exposure to poison ivy occurred while performing outdoor activities around his residence. The rash has been present for approximately 2 to 3 days and is characterized by severe itching. Attempts to alleviate the itching include refraining from scratching, although occasional scratching has resulted in blood on his hand. Calamine lotion has been used for management, but the rash has spread to his shoulder and the posterior aspect of his lower legs.  He denies fever, chills, cough, shortness of breath or wheezing. Additionally, there is no swelling of the face, tongue, or throat, and no difficulty swallowing.    Past History:    Past Medical history   Past Medical History:   Diagnosis Date    Chronic kidney disease 07/12/2021    per notes in patient chart from ED visit on 7/12/2021; hospital two months ago - badly dehydrated denies kidney disease and does not see neph or uro    Dyspnea 11/14/2012    only under exertion     GERD (gastroesophageal reflux disease)     controlled with medication     Gout     allopurinol    Hiatal hernia     History of COVID-19 10/2020    respiratory symptoms but no hosp no vent     History of EKG 07/12/2021    EKG done at [facility] SR poor R wave progression    HLD (hyperlipidemia) 11/14/2012    HTN (hypertension) 11/14/2012    Hx of cardiovascular stress test 07/13/2021    LVEF 65% normal per interpretation from [facility]    Hypercholesteremia     atorvastatin    Obesity

## 2025-04-22 NOTE — PATIENT INSTRUCTIONS
*Take the steroid taper as prescribed for a full 12 days.  *Use ice packs or cool compresses as needed for itch.

## 2025-05-05 RX ORDER — LISINOPRIL AND HYDROCHLOROTHIAZIDE 12.5; 2 MG/1; MG/1
2 TABLET ORAL DAILY
Qty: 30 TABLET | Refills: 5 | Status: CANCELLED | OUTPATIENT
Start: 2025-05-05

## 2025-05-06 ENCOUNTER — OFFICE VISIT (OUTPATIENT)
Dept: FAMILY MEDICINE CLINIC | Facility: CLINIC | Age: 65
End: 2025-05-06
Payer: MEDICARE

## 2025-05-06 VITALS
SYSTOLIC BLOOD PRESSURE: 119 MMHG | HEART RATE: 75 BPM | WEIGHT: 284 LBS | DIASTOLIC BLOOD PRESSURE: 75 MMHG | BODY MASS INDEX: 40.66 KG/M2 | HEIGHT: 70 IN | OXYGEN SATURATION: 97 %

## 2025-05-06 DIAGNOSIS — R06.09 DYSPNEA ON EXERTION: ICD-10-CM

## 2025-05-06 DIAGNOSIS — N18.31 CKD STAGE 3A, GFR 45-59 ML/MIN (HCC): ICD-10-CM

## 2025-05-06 DIAGNOSIS — I95.9 HYPOTENSION, UNSPECIFIED HYPOTENSION TYPE: Primary | ICD-10-CM

## 2025-05-06 DIAGNOSIS — E66.813 CLASS 3 SEVERE OBESITY DUE TO EXCESS CALORIES WITH SERIOUS COMORBIDITY AND BODY MASS INDEX (BMI) OF 40.0 TO 44.9 IN ADULT (HCC): ICD-10-CM

## 2025-05-06 DIAGNOSIS — K21.9 GASTROESOPHAGEAL REFLUX DISEASE WITHOUT ESOPHAGITIS: ICD-10-CM

## 2025-05-06 DIAGNOSIS — I10 PRIMARY HYPERTENSION: ICD-10-CM

## 2025-05-06 DIAGNOSIS — F33.41 RECURRENT MAJOR DEPRESSIVE DISORDER, IN PARTIAL REMISSION: ICD-10-CM

## 2025-05-06 PROCEDURE — 1123F ACP DISCUSS/DSCN MKR DOCD: CPT | Performed by: FAMILY MEDICINE

## 2025-05-06 PROCEDURE — 3078F DIAST BP <80 MM HG: CPT | Performed by: FAMILY MEDICINE

## 2025-05-06 PROCEDURE — 99214 OFFICE O/P EST MOD 30 MIN: CPT | Performed by: FAMILY MEDICINE

## 2025-05-06 PROCEDURE — 3074F SYST BP LT 130 MM HG: CPT | Performed by: FAMILY MEDICINE

## 2025-05-06 PROCEDURE — G2211 COMPLEX E/M VISIT ADD ON: HCPCS | Performed by: FAMILY MEDICINE

## 2025-05-06 RX ORDER — PANTOPRAZOLE SODIUM 40 MG/1
40 TABLET, DELAYED RELEASE ORAL 2 TIMES DAILY
Qty: 30 TABLET | Refills: 3 | Status: CANCELLED | OUTPATIENT
Start: 2025-05-06

## 2025-05-06 RX ORDER — LISINOPRIL 40 MG/1
40 TABLET ORAL DAILY
Qty: 30 TABLET | Refills: 3 | Status: SHIPPED | OUTPATIENT
Start: 2025-05-06

## 2025-05-06 RX ORDER — PRAZOSIN HYDROCHLORIDE 2 MG/1
4 CAPSULE ORAL NIGHTLY
Qty: 30 CAPSULE | Refills: 5 | Status: CANCELLED | OUTPATIENT
Start: 2025-05-06

## 2025-05-06 RX ORDER — ALLOPURINOL 300 MG/1
300 TABLET ORAL DAILY
Qty: 30 TABLET | Refills: 3 | Status: CANCELLED | OUTPATIENT
Start: 2025-05-06

## 2025-05-06 NOTE — PROGRESS NOTES
[facility] [place] [address]  Phone: [phone] Fax [phone]  Gianluca Hoyos M.D.  5/6/2025        Maxwell Hernandez Jr. is a 65 y.o. male     HPI:  Patient is seen for Blood Pressure Check (Pt reports hypotension for about a week. Reports feeling weak, dizzy, and slugging)     Patient comes in today having had increase of symptoms such as weakness, dizziness, and feeling sluggish since last Friday.  He has checked his blood pressure and pulse during that time frequently with systolic pressures between 78 and 115 with occasional systolic pressure in the 60s when standing.  Diastolic pressures have been between 54 and 75 but with pressures occasionally in the 40s and 50s standing.    Only associated symptom otherwise has been some shortness of breath with exertion such as blowing up trash the other day.  No PND or orthopnea.  No syncope or collapse.  No associated chest pain.    Since having calcium scoring test, he has not followed back up with cardiology as of yet.  Does have an appointment on 6/10/2025.  At previous fasting visit here he had noted stage IIIa CKD.  Did not have listed that he was taking diclofenac but he states [facility].  He does take lisinopril HCTZ 10/12.5 orally twice daily.    Patient did see LOPEZ Cota, here on 4/22/2025 and was treated for contact dermatitis due to poison ivy.  He has finished the prednisone taper.      ROS:  Denies any chest pain diaphoresis or edema.  No reported palpitations or tachycardia. No polydipsia or polyphagia or polyuria.  No large weight fluctuations. No recurrent breakthrough dyspepsia or reflux.  No abdominal pain. No recent change in voiding or stooling.  No thoughts of harming himself or others.  '  Allergies   Allergen Reactions    Oxycodone Other (See Comments)     Panic attacks.        Active Ambulatory Problems     Diagnosis Date Noted    Obesity,

## 2025-06-09 NOTE — PROGRESS NOTES
but no hosp no vent     History of EKG 07/12/2021    EKG done at [facility] SR poor R wave progression    HLD (hyperlipidemia) 11/14/2012    HTN (hypertension) 11/14/2012    Hx of cardiovascular stress test 07/13/2021    LVEF 65% normal per interpretation from [facility]    Hypercholesteremia     atorvastatin    Obesity 11/14/2012    Osteoarthritis 11/14/2012    Psychiatric disorder     on medication for anxiety depression    Skin cancer     Sleep apnea 11/14/2012    cpap      Past Surgical History:   Procedure Laterality Date    APPENDECTOMY      1983    CARPAL TUNNEL RELEASE      06/29/2010    CHOLECYSTECTOMY      Dr. Stern 10/09/2007    COLONOSCOPY  07/14/2021    Dr. Castillo; benign polyp of the sigmoid colon, diverticulosis of large intestine without perforation    LITHOTRIPSY      righ 03/03/2008 and left 06/30/2008    ORTHOPEDIC SURGERY Left 11/28/2021    Dr. Jones; irrigation/surgical articular insert revision secondary to septic arthritis    ORTHOPEDIC SURGERY      hand tendon repair    TX UNLISTED PROCEDURE CARDIAC SURGERY      cath no intervention has seen cardio since cath and states heart is fine     TOTAL KNEE ARTHROPLASTY Left 10/29/2021    Dr. Jones; total knee arthroplasty    TOTAL KNEE ARTHROPLASTY Right 03/08/2021    Dr. Jones    UPPER GASTROINTESTINAL ENDOSCOPY  09/14/2021    Dr. Castillo; hiatal hernia, GERD without esophagitis     Family History   Problem Relation Age of Onset    Diabetes Mother     Cancer Mother         lung    Cancer Father         lung and brain cancer    Diabetes Father     Heart Attack Father 58        again at 64    Diabetes Sister     Drug Abuse Sister     Hypertension Brother     Diabetes Paternal Grandmother      Social History     Tobacco Use    Smoking status: Never    Smokeless tobacco: Never   Substance Use Topics    Alcohol use: Yes     Alcohol/week: 2.0 standard drinks of alcohol     Types: 1 Cans of beer, 1 Shots of liquor per week     Comment: twice yearly

## 2025-06-10 ENCOUNTER — OFFICE VISIT (OUTPATIENT)
Age: 65
End: 2025-06-10
Payer: MEDICARE

## 2025-06-10 VITALS
HEIGHT: 70 IN | WEIGHT: 287 LBS | HEART RATE: 78 BPM | DIASTOLIC BLOOD PRESSURE: 78 MMHG | BODY MASS INDEX: 41.09 KG/M2 | SYSTOLIC BLOOD PRESSURE: 139 MMHG

## 2025-06-10 DIAGNOSIS — E66.01 MORBID OBESITY (HCC): ICD-10-CM

## 2025-06-10 DIAGNOSIS — R93.1 ELEVATED CORONARY ARTERY CALCIUM SCORE: Primary | ICD-10-CM

## 2025-06-10 DIAGNOSIS — E78.5 DYSLIPIDEMIA: ICD-10-CM

## 2025-06-10 DIAGNOSIS — I10 WHITE COAT SYNDROME WITH DIAGNOSIS OF HYPERTENSION: ICD-10-CM

## 2025-06-10 DIAGNOSIS — I10 ESSENTIAL HYPERTENSION: ICD-10-CM

## 2025-06-10 PROCEDURE — 3078F DIAST BP <80 MM HG: CPT | Performed by: INTERNAL MEDICINE

## 2025-06-10 PROCEDURE — 1123F ACP DISCUSS/DSCN MKR DOCD: CPT | Performed by: INTERNAL MEDICINE

## 2025-06-10 PROCEDURE — 3075F SYST BP GE 130 - 139MM HG: CPT | Performed by: INTERNAL MEDICINE

## 2025-06-10 PROCEDURE — 99214 OFFICE O/P EST MOD 30 MIN: CPT | Performed by: INTERNAL MEDICINE

## 2025-06-10 ASSESSMENT — ENCOUNTER SYMPTOMS: SHORTNESS OF BREATH: 0

## 2025-06-10 NOTE — PATIENT INSTRUCTIONS
Following and maintaining these health behaviors and conditions are very effective in reducing the risk of serious cardiovascular disease:     Active lifestyle  Healthy, plant rich diet  BP control  Blood sugar control  Maintain a healthy weight  Cholesterol control  Don't smoke  Adequate sleep            CHANGE AT ANY AGE CAN POTENTIALLY INCREASE YOUR LIFE SPAN:    A sustained change from a typical Western to a predominantly plant based diet     At age 20 --Women may added on average 10.7 years, Men 13 years                         At age 60 --Women >= 8 years, Men >= 8.8 years                         At age 80--Women and Men > 3.4 years    The largest gains are seen from eating:    More legumes, whole grains, and nuts    Less red meat and processed meats    Google \"Food As Medicine Jumpstart\" for a free online pamphlet from the American College of Lifestyle Medicine:  https://lifestylemedicine.org/wp-content/uploads/2024/01/ACLM-Food-As-Medicine-Jumpstart-8.5x11.pdf    Please visit www.cardiosmart.org for more information regarding cardiovascular disease prevention and treatment.

## 2025-07-21 ENCOUNTER — TELEPHONE (OUTPATIENT)
Dept: FAMILY MEDICINE CLINIC | Facility: CLINIC | Age: 65
End: 2025-07-21

## 2025-07-22 ENCOUNTER — TELEPHONE (OUTPATIENT)
Dept: FAMILY MEDICINE CLINIC | Facility: CLINIC | Age: 65
End: 2025-07-22

## 2025-07-28 ENCOUNTER — OFFICE VISIT (OUTPATIENT)
Dept: FAMILY MEDICINE CLINIC | Facility: CLINIC | Age: 65
End: 2025-07-28
Payer: MEDICARE

## 2025-07-28 VITALS
HEART RATE: 64 BPM | BODY MASS INDEX: 41.09 KG/M2 | TEMPERATURE: 97.7 F | DIASTOLIC BLOOD PRESSURE: 83 MMHG | SYSTOLIC BLOOD PRESSURE: 120 MMHG | OXYGEN SATURATION: 97 % | HEIGHT: 70 IN | RESPIRATION RATE: 16 BRPM | WEIGHT: 287 LBS

## 2025-07-28 DIAGNOSIS — N18.31 CKD STAGE 3A, GFR 45-59 ML/MIN (HCC): ICD-10-CM

## 2025-07-28 DIAGNOSIS — M25.571 CHRONIC PAIN OF RIGHT ANKLE: ICD-10-CM

## 2025-07-28 DIAGNOSIS — I10 PRIMARY HYPERTENSION: Primary | ICD-10-CM

## 2025-07-28 DIAGNOSIS — R73.9 HYPERGLYCEMIA: ICD-10-CM

## 2025-07-28 DIAGNOSIS — F33.41 RECURRENT MAJOR DEPRESSIVE DISORDER, IN PARTIAL REMISSION: ICD-10-CM

## 2025-07-28 DIAGNOSIS — M1A.0720 CHRONIC IDIOPATHIC GOUT INVOLVING TOE OF LEFT FOOT WITHOUT TOPHUS: ICD-10-CM

## 2025-07-28 DIAGNOSIS — F43.10 PTSD (POST-TRAUMATIC STRESS DISORDER): ICD-10-CM

## 2025-07-28 DIAGNOSIS — E66.813 CLASS 3 SEVERE OBESITY DUE TO EXCESS CALORIES WITH SERIOUS COMORBIDITY AND BODY MASS INDEX (BMI) OF 40.0 TO 44.9 IN ADULT (HCC): ICD-10-CM

## 2025-07-28 DIAGNOSIS — E78.2 MIXED HYPERLIPIDEMIA: ICD-10-CM

## 2025-07-28 DIAGNOSIS — G89.29 CHRONIC PAIN OF RIGHT ANKLE: ICD-10-CM

## 2025-07-28 DIAGNOSIS — R35.1 NOCTURIA: ICD-10-CM

## 2025-07-28 DIAGNOSIS — G47.33 OSA ON CPAP: ICD-10-CM

## 2025-07-28 LAB
ALBUMIN SERPL-MCNC: 3.9 G/DL (ref 3.2–4.6)
ALBUMIN/GLOB SERPL: 1.2 (ref 1–1.9)
ALP SERPL-CCNC: 103 U/L (ref 40–129)
ALT SERPL-CCNC: 29 U/L (ref 8–55)
ANION GAP SERPL CALC-SCNC: 12 MMOL/L (ref 7–16)
AST SERPL-CCNC: 20 U/L (ref 15–37)
BASOPHILS # BLD: 0.08 K/UL (ref 0–0.2)
BASOPHILS NFR BLD: 0.8 % (ref 0–2)
BILIRUB SERPL-MCNC: 0.6 MG/DL (ref 0–1.2)
BUN SERPL-MCNC: 17 MG/DL (ref 8–23)
CALCIUM SERPL-MCNC: 10.1 MG/DL (ref 8.8–10.2)
CHLORIDE SERPL-SCNC: 102 MMOL/L (ref 98–107)
CHOLEST SERPL-MCNC: 147 MG/DL (ref 0–200)
CO2 SERPL-SCNC: 26 MMOL/L (ref 20–29)
CREAT SERPL-MCNC: 1.29 MG/DL (ref 0.8–1.3)
DIFFERENTIAL METHOD BLD: ABNORMAL
EOSINOPHIL # BLD: 0.29 K/UL (ref 0–0.8)
EOSINOPHIL NFR BLD: 2.9 % (ref 0.5–7.8)
ERYTHROCYTE [DISTWIDTH] IN BLOOD BY AUTOMATED COUNT: 12.6 % (ref 11.9–14.6)
EST. AVERAGE GLUCOSE BLD GHB EST-MCNC: 117 MG/DL
GLOBULIN SER CALC-MCNC: 3.2 G/DL (ref 2.3–3.5)
GLUCOSE SERPL-MCNC: 87 MG/DL (ref 70–99)
HBA1C MFR BLD: 5.7 % (ref 0–5.6)
HCT VFR BLD AUTO: 46.5 % (ref 41.1–50.3)
HDLC SERPL-MCNC: 33 MG/DL (ref 40–60)
HDLC SERPL: 4.4 (ref 0–5)
HGB BLD-MCNC: 15.4 G/DL (ref 13.6–17.2)
IMM GRANULOCYTES # BLD AUTO: 0.04 K/UL (ref 0–0.5)
IMM GRANULOCYTES NFR BLD AUTO: 0.4 % (ref 0–5)
LDLC SERPL CALC-MCNC: 83 MG/DL (ref 0–100)
LYMPHOCYTES # BLD: 2.43 K/UL (ref 0.5–4.6)
LYMPHOCYTES NFR BLD: 24.5 % (ref 13–44)
MCH RBC QN AUTO: 31.2 PG (ref 26.1–32.9)
MCHC RBC AUTO-ENTMCNC: 33.1 G/DL (ref 31.4–35)
MCV RBC AUTO: 94.1 FL (ref 82–102)
MONOCYTES # BLD: 0.68 K/UL (ref 0.1–1.3)
MONOCYTES NFR BLD: 6.9 % (ref 4–12)
NEUTS SEG # BLD: 6.4 K/UL (ref 1.7–8.2)
NEUTS SEG NFR BLD: 64.5 % (ref 43–78)
NRBC # BLD: 0 K/UL (ref 0–0.2)
PLATELET # BLD AUTO: 188 K/UL (ref 150–450)
PMV BLD AUTO: 12.5 FL (ref 9.4–12.3)
POTASSIUM SERPL-SCNC: 5.2 MMOL/L (ref 3.5–5.1)
PROT SERPL-MCNC: 7.2 G/DL (ref 6.3–8.2)
PSA SERPL-MCNC: 1.5 NG/ML (ref 0–4)
RBC # BLD AUTO: 4.94 M/UL (ref 4.23–5.6)
SODIUM SERPL-SCNC: 139 MMOL/L (ref 136–145)
TRIGL SERPL-MCNC: 157 MG/DL (ref 0–150)
URATE SERPL-MCNC: 8 MG/DL (ref 3.9–8.2)
VLDLC SERPL CALC-MCNC: 31 MG/DL (ref 6–23)
WBC # BLD AUTO: 9.9 K/UL (ref 4.3–11.1)

## 2025-07-28 PROCEDURE — 3079F DIAST BP 80-89 MM HG: CPT | Performed by: FAMILY MEDICINE

## 2025-07-28 PROCEDURE — 3074F SYST BP LT 130 MM HG: CPT | Performed by: FAMILY MEDICINE

## 2025-07-28 PROCEDURE — 1123F ACP DISCUSS/DSCN MKR DOCD: CPT | Performed by: FAMILY MEDICINE

## 2025-07-28 PROCEDURE — G2211 COMPLEX E/M VISIT ADD ON: HCPCS | Performed by: FAMILY MEDICINE

## 2025-07-28 PROCEDURE — 99214 OFFICE O/P EST MOD 30 MIN: CPT | Performed by: FAMILY MEDICINE

## 2025-07-28 NOTE — PROGRESS NOTES
[facility] [place] [address]  Phone: [phone] Fax [phone]  Gianluca Hoyos M.D.  7/28/2025        Maxwell Hernandez Jr. is a 65 y.o. male     HPI:  Patient is seen for Follow-up (6 m/o f/u with labs- fasting)     Patient comes in fasting for follow-up.  He has seen orthopedist and is planning on having a right ankle fusion in the near future.  We received a request last week in regards to clearance for surgery.  We sent that back with clearance from our standpoint for this surgical intervention as long as his cardiologist is in agreement.    Reviewed with patient his last cardiology appointment on 6/10/2025.  Commended patient for starting exercise with his grandson but encouraged him to increase this as soon as able after surgical intervention with goal of 20 to 30 minutes of aerobic activity most days and a few days weekly of light weight resistance exercise.    Discussed with patient his significantly elevated calcium score.  Also discussed his diagnosis of prediabetes with need for avoidance of excessive sweets and excessive simple carbohydrates.    Patient had some mildly decreased evidence of renal insufficiency prior but states he no longer is taking diclofenac.  He continues acetaminophen however.  He also had prior notation for mildly elevated liver transaminase.    [facility].  Uses his CPAP nightly and is getting a new machine soon.  He does have history of PTSD and feels like his Prozac is working fairly well in regards to this but still has difficulty sleeping most nights with difficulty falling asleep and staying asleep.  Does take his Ambien fairly regularly with good benefit.  No thoughts of harming himself or others.    Has not had a recent gout flare.  This does involve the great toe and not the ankle that is requiring surgical intervention.    He continues max dose of atorvastatin.

## 2025-07-30 ENCOUNTER — TELEPHONE (OUTPATIENT)
Dept: FAMILY MEDICINE CLINIC | Facility: CLINIC | Age: 65
End: 2025-07-30

## (undated) DEVICE — 3M™ COBAN™ SELF-ADHERENT WRAP, 1586S, STERILE, 6 IN X 5 YD (15 CM X 4,5 M), 12 ROLLS/CASE: Brand: 3M™ COBAN™

## (undated) DEVICE — DRAPE,U/SHT,SPLIT,FILM,60X84,STERILE: Brand: MEDLINE

## (undated) DEVICE — DRESSING,GAUZE,XEROFORM,CURAD,1"X8",ST: Brand: CURAD

## (undated) DEVICE — PADDING CAST W6INXL4YD ST COT COHESIVE HND TEARABLE SPEC

## (undated) DEVICE — SOLUTION IRRIG 3000ML 0.9% SOD CHL FLX CONT 0797208] ICU MEDICAL INC]

## (undated) DEVICE — SUT ETHLN 2-0 18IN FS BLK --

## (undated) DEVICE — STERILE TETRA-FLEX CF LF, 6IN X 11 YD: Brand: TETRA-FLEX™ CF

## (undated) DEVICE — GAUZE,SPONGE,4"X4",16PLY,STRL,LF,10/TRAY: Brand: MEDLINE

## (undated) DEVICE — STERILE PRESSURE PROTECTOR PAD® FOR DE MAYO UNIVERSAL DISTRACTOR® (10/CASE): Brand: DE MAYO UNIVERSAL DISTRACTOR®

## (undated) DEVICE — CONNECTING ROD: Brand: HOFFMANN

## (undated) DEVICE — REM POLYHESIVE ADULT PATIENT RETURN ELECTRODE: Brand: VALLEYLAB

## (undated) DEVICE — Device

## (undated) DEVICE — SPONGE GZ W4XL4IN COT 12 PLY TYP VII WVN C FLD DSGN

## (undated) DEVICE — LOWER EXTREMITY: Brand: MEDLINE INDUSTRIES, INC.

## (undated) DEVICE — T4 HOOD

## (undated) DEVICE — AMD ANTIMICROBIAL GAUZE SPONGES,12 PLY USP TYPE VII, 0.2% POLYHEXAMETHYLENE BIGUANIDE HCI (PHMB): Brand: CURITY

## (undated) DEVICE — YANKAUER,BULB TIP,W/O VENT,RIGID,STERILE: Brand: MEDLINE

## (undated) DEVICE — HANDPIECE SET WITH COAXIAL HIGH FLOW TIP AND SUCTION TUBE: Brand: INTERPULSE

## (undated) DEVICE — STERILE SLEEVE: Brand: CONVERTORS

## (undated) DEVICE — DRAPE TBL W72XH34IN D30IN SGL PC DISPOSABLE

## (undated) DEVICE — BASIC SINGLE BASIN-LF: Brand: MEDLINE INDUSTRIES, INC.

## (undated) DEVICE — SUTURE VCRL SZ 1 L27IN ABSRB UD L36MM CP-1 1/2 CIR REV CUT J268H

## (undated) DEVICE — ZIMMER® STERILE DISPOSABLE TOURNIQUET CUFF WITH PROTECTIVE SLEEVE, DUAL PORT, SINGLE BLADDER, 34 IN. (86 CM)

## (undated) DEVICE — RIMMED SPEED PIN 65MM STERILE

## (undated) DEVICE — TOTAL KNEE DR LEE: Brand: MEDLINE INDUSTRIES, INC.

## (undated) DEVICE — SUTURE VCRL SZ 0 L36IN ABSRB UD L36MM CT-1 1/2 CIR J946H

## (undated) DEVICE — SUTURE VCRL SZ 2-0 L27IN ABSRB UD L36MM CP-1 1/2 CIR REV J266H

## (undated) DEVICE — RESERVOIR,SUCTION,100CC,SILICONE: Brand: MEDLINE

## (undated) DEVICE — INTENDED FOR TISSUE SEPARATION, AND OTHER PROCEDURES THAT REQUIRE A SHARP SURGICAL BLADE TO PUNCTURE OR CUT.: Brand: BARD-PARKER SAFETY BLADES SIZE 15, STERILE

## (undated) DEVICE — SUTURE VCRL SZ 0 L27IN ABSRB UD L36MM CP-1 1/2 CIR REV CUT J267H

## (undated) DEVICE — ABDOMINAL PAD: Brand: DERMACEA

## (undated) DEVICE — GENESIS TROCHLEAR PIN 1/8 X 3: Brand: GENESIS

## (undated) DEVICE — SUTURE VCRL SZ 1 L27IN ABSRB UD CT-1 L36MM 1/2 CIR J261H

## (undated) DEVICE — STRYKER PERFORMANCE SERIES SAGITTAL BLADE: Brand: STRYKER PERFORMANCE SERIES

## (undated) DEVICE — PREP SKN CHLRAPRP APL 26ML STR --

## (undated) DEVICE — DRAPE,TOP,102X53,STERILE: Brand: MEDLINE

## (undated) DEVICE — RIMMED SPEED PIN 45MM STERILE

## (undated) DEVICE — PAD,ABDOMINAL,5"X9",ST,LF,25/BX: Brand: MEDLINE INDUSTRIES, INC.

## (undated) DEVICE — INTENDED FOR TISSUE SEPARATION, AND OTHER PROCEDURES THAT REQUIRE A SHARP SURGICAL BLADE TO PUNCTURE OR CUT.: Brand: BARD-PARKER SAFETY BLADES SIZE 10, STERILE

## (undated) DEVICE — 2000CC GUARDIAN II: Brand: GUARDIAN

## (undated) DEVICE — BUTTON SWITCH PENCIL BLADE ELECTRODE, HOLSTER: Brand: EDGE

## (undated) DEVICE — PEEL-AWAY HOOD: Brand: FLYTE, SURGICOOL

## (undated) DEVICE — 3000CC GUARDIAN II: Brand: GUARDIAN

## (undated) DEVICE — DRAIN SURG 3/4 W10MMXL20CM 100% SIL PERF FLAT HUBLESS

## (undated) DEVICE — ZIMMER® STERILE DISPOSABLE TOURNIQUET CUFF WITH PLC, DUAL PORT, SINGLE BLADDER, 42 IN. (107 CM)